# Patient Record
Sex: MALE | Race: WHITE | HISPANIC OR LATINO | Employment: OTHER | URBAN - METROPOLITAN AREA
[De-identification: names, ages, dates, MRNs, and addresses within clinical notes are randomized per-mention and may not be internally consistent; named-entity substitution may affect disease eponyms.]

---

## 2017-01-16 DIAGNOSIS — I10 BENIGN ESSENTIAL HYPERTENSION: Primary | ICD-10-CM

## 2017-01-17 RX ORDER — LISINOPRIL 20 MG/1
20 TABLET ORAL DAILY
Qty: 30 TABLET | Refills: 3 | Status: SHIPPED | OUTPATIENT
Start: 2017-01-17 | End: 2017-06-26

## 2017-03-20 DIAGNOSIS — R80.9 TYPE 2 DIABETES MELLITUS WITH MICROALBUMINURIA, WITHOUT LONG-TERM CURRENT USE OF INSULIN (H): ICD-10-CM

## 2017-03-20 DIAGNOSIS — E11.29 TYPE 2 DIABETES MELLITUS WITH MICROALBUMINURIA, WITHOUT LONG-TERM CURRENT USE OF INSULIN (H): ICD-10-CM

## 2017-03-22 DIAGNOSIS — R80.9 TYPE 2 DIABETES MELLITUS WITH MICROALBUMINURIA, WITHOUT LONG-TERM CURRENT USE OF INSULIN (H): ICD-10-CM

## 2017-03-22 DIAGNOSIS — E11.29 TYPE 2 DIABETES MELLITUS WITH MICROALBUMINURIA, WITHOUT LONG-TERM CURRENT USE OF INSULIN (H): ICD-10-CM

## 2017-03-23 RX ORDER — GLIPIZIDE 10 MG/1
10 TABLET, FILM COATED, EXTENDED RELEASE ORAL DAILY
Qty: 30 TABLET | Refills: 0 | Status: SHIPPED | OUTPATIENT
Start: 2017-03-23 | End: 2017-09-20

## 2017-05-01 DIAGNOSIS — R80.9 TYPE 2 DIABETES MELLITUS WITH MICROALBUMINURIA, WITHOUT LONG-TERM CURRENT USE OF INSULIN (H): ICD-10-CM

## 2017-05-01 DIAGNOSIS — E11.29 TYPE 2 DIABETES MELLITUS WITH MICROALBUMINURIA, WITHOUT LONG-TERM CURRENT USE OF INSULIN (H): ICD-10-CM

## 2017-05-02 NOTE — TELEPHONE ENCOUNTER
Called patient and left a message informing him that his medication was approved but needs to follow up for any further refills. Any questions please call the clinic

## 2017-06-26 DIAGNOSIS — I10 BENIGN ESSENTIAL HYPERTENSION: ICD-10-CM

## 2017-06-26 DIAGNOSIS — R80.9 TYPE 2 DIABETES MELLITUS WITH MICROALBUMINURIA, WITHOUT LONG-TERM CURRENT USE OF INSULIN (H): ICD-10-CM

## 2017-06-26 DIAGNOSIS — E11.29 TYPE 2 DIABETES MELLITUS WITH MICROALBUMINURIA, WITHOUT LONG-TERM CURRENT USE OF INSULIN (H): ICD-10-CM

## 2017-06-26 RX ORDER — LISINOPRIL 20 MG/1
20 TABLET ORAL DAILY
Qty: 30 TABLET | Refills: 3 | Status: SHIPPED | OUTPATIENT
Start: 2017-06-26 | End: 2017-09-20

## 2017-08-14 DIAGNOSIS — R80.9 TYPE 2 DIABETES MELLITUS WITH MICROALBUMINURIA, WITHOUT LONG-TERM CURRENT USE OF INSULIN (H): ICD-10-CM

## 2017-08-14 DIAGNOSIS — E11.29 TYPE 2 DIABETES MELLITUS WITH MICROALBUMINURIA, WITHOUT LONG-TERM CURRENT USE OF INSULIN (H): ICD-10-CM

## 2017-08-14 NOTE — TELEPHONE ENCOUNTER
Left a message on recorder to call back. Ok to relay message: per last refill patient needed Diabetic check up appointment prior to additional refills.

## 2017-08-18 NOTE — TELEPHONE ENCOUNTER
Called pharmacy to check to see if patient has picked up medication. He has not the pharamcy is going to try and get a hold of him when they do they will inform him he needs to make a follow up appointment for his Diabetes.

## 2017-09-20 ENCOUNTER — TELEPHONE (OUTPATIENT)
Dept: FAMILY MEDICINE | Facility: CLINIC | Age: 72
End: 2017-09-20

## 2017-09-20 DIAGNOSIS — E11.29 TYPE 2 DIABETES MELLITUS WITH MICROALBUMINURIA, WITHOUT LONG-TERM CURRENT USE OF INSULIN (H): ICD-10-CM

## 2017-09-20 DIAGNOSIS — R80.9 TYPE 2 DIABETES MELLITUS WITH MICROALBUMINURIA, WITHOUT LONG-TERM CURRENT USE OF INSULIN (H): ICD-10-CM

## 2017-09-20 DIAGNOSIS — I10 BENIGN ESSENTIAL HYPERTENSION: ICD-10-CM

## 2017-09-20 RX ORDER — GLIPIZIDE 10 MG/1
10 TABLET, FILM COATED, EXTENDED RELEASE ORAL DAILY
Qty: 30 TABLET | Refills: 0 | Status: SHIPPED | OUTPATIENT
Start: 2017-09-20 | End: 2017-10-06

## 2017-09-20 RX ORDER — LISINOPRIL 20 MG/1
20 TABLET ORAL DAILY
Qty: 30 TABLET | Refills: 0 | Status: SHIPPED | OUTPATIENT
Start: 2017-09-20 | End: 2017-10-06

## 2017-09-20 NOTE — TELEPHONE ENCOUNTER
[facility] phone call message- patient requesting a refill:    Full Medication Name:    metFORMIN (GLUCOPHAGE) 1000 MG tablet,    lisinopril (PRINIVIL/ZESTRIL) 20 MG tablet,      glipiZIDE (GLUCOTROL XL) 10 MG 24 hr tablet     Pharmacy confirmed as     [facility] [address] [place] [address]), [place] - [address] ([place]) [place] [address]  Phone: [phone] Fax: [phone]  : Yes    Additional Comments:   Please refill RXs above and need ASAP, [facility] pharmacy has faxed over request more than once with no response, please rush.     OK to leave a message on voice mail? Yes    Primary language: English      needed? No    Call taken on September 20, 2017 at 11:27 AM by Little Lester

## 2017-09-21 DIAGNOSIS — E11.29 TYPE 2 DIABETES MELLITUS WITH MICROALBUMINURIA, WITHOUT LONG-TERM CURRENT USE OF INSULIN (H): ICD-10-CM

## 2017-09-21 DIAGNOSIS — R80.9 TYPE 2 DIABETES MELLITUS WITH MICROALBUMINURIA, WITHOUT LONG-TERM CURRENT USE OF INSULIN (H): ICD-10-CM

## 2017-10-06 ENCOUNTER — RECORDS - HEALTHEAST (OUTPATIENT)
Dept: ADMINISTRATIVE | Facility: OTHER | Age: 72
End: 2017-10-06

## 2017-10-06 ENCOUNTER — OFFICE VISIT (OUTPATIENT)
Dept: FAMILY MEDICINE | Facility: CLINIC | Age: 72
End: 2017-10-06

## 2017-10-06 VITALS
WEIGHT: 157.8 LBS | BODY MASS INDEX: 25.09 KG/M2 | SYSTOLIC BLOOD PRESSURE: 167 MMHG | TEMPERATURE: 98 F | HEART RATE: 67 BPM | OXYGEN SATURATION: 99 % | DIASTOLIC BLOOD PRESSURE: 96 MMHG

## 2017-10-06 DIAGNOSIS — Z00.00 ROUTINE GENERAL MEDICAL EXAMINATION AT A HEALTH CARE FACILITY: ICD-10-CM

## 2017-10-06 DIAGNOSIS — R01.1 HEART MURMUR: ICD-10-CM

## 2017-10-06 DIAGNOSIS — E11.29 TYPE 2 DIABETES MELLITUS WITH MICROALBUMINURIA, WITHOUT LONG-TERM CURRENT USE OF INSULIN (H): ICD-10-CM

## 2017-10-06 DIAGNOSIS — E11.65 TYPE 2 DIABETES MELLITUS WITH HYPERGLYCEMIA, WITHOUT LONG-TERM CURRENT USE OF INSULIN (H): Primary | ICD-10-CM

## 2017-10-06 DIAGNOSIS — Z00.00 PREVENTATIVE HEALTH CARE: ICD-10-CM

## 2017-10-06 DIAGNOSIS — L71.9 ROSACEA: ICD-10-CM

## 2017-10-06 DIAGNOSIS — L60.2 OVERGROWN NAIL: ICD-10-CM

## 2017-10-06 DIAGNOSIS — R80.9 TYPE 2 DIABETES MELLITUS WITH MICROALBUMINURIA, WITHOUT LONG-TERM CURRENT USE OF INSULIN (H): ICD-10-CM

## 2017-10-06 DIAGNOSIS — N18.30 CKD (CHRONIC KIDNEY DISEASE) STAGE 3, GFR 30-59 ML/MIN (H): ICD-10-CM

## 2017-10-06 DIAGNOSIS — Q18.1 CYST ON EAR: ICD-10-CM

## 2017-10-06 DIAGNOSIS — I10 BENIGN ESSENTIAL HYPERTENSION: ICD-10-CM

## 2017-10-06 DIAGNOSIS — I10 ESSENTIAL HYPERTENSION: ICD-10-CM

## 2017-10-06 DIAGNOSIS — R21 RASH: ICD-10-CM

## 2017-10-06 RX ORDER — METRONIDAZOLE 7.5 MG/G
GEL TOPICAL 2 TIMES DAILY
Qty: 45 G | Refills: 11 | Status: SHIPPED | OUTPATIENT
Start: 2017-10-06 | End: 2018-04-20

## 2017-10-06 RX ORDER — LISINOPRIL 40 MG/1
40 TABLET ORAL DAILY
Qty: 90 TABLET | Refills: 3 | Status: SHIPPED | OUTPATIENT
Start: 2017-10-06 | End: 2018-04-20

## 2017-10-06 RX ORDER — ATORVASTATIN CALCIUM 40 MG/1
80 TABLET, FILM COATED ORAL DAILY
Qty: 90 TABLET | Refills: 3 | Status: SHIPPED | OUTPATIENT
Start: 2017-10-06 | End: 2018-04-20

## 2017-10-06 RX ORDER — GLIPIZIDE 10 MG/1
10 TABLET, FILM COATED, EXTENDED RELEASE ORAL DAILY
Qty: 90 TABLET | Refills: 3 | Status: SHIPPED | OUTPATIENT
Start: 2017-10-06 | End: 2018-04-20

## 2017-10-06 NOTE — PATIENT INSTRUCTIONS
Return in about 4 weeks for labs please.    When you want you can come in for removal of cyst behind left ear and we can also remove mole from neck then.    You could have a scan of your heart because of the murmur some time.      PODIATRY REFERRAL:  [facility]  [address]   [place] [address]  [phone]  Date:  Tuesday October 10, 2017  Time:  2:15 PM with Dr. Diego  Given to patient today.  Little Lester  10/6/17

## 2017-10-06 NOTE — MR AVS SNAPSHOT
After Visit Summary   10/6/2017    Dennis Blum    MRN: [id number]           Patient Information     Date Of Birth          1945        Visit Information        Provider Department      10/6/2017 8:20 AM Pavan Christy MD [facility]        Today's Diagnoses     Type 2 diabetes mellitus with hyperglycemia, without long-term current use of insulin (H)    -  1    CKD (chronic kidney disease) stage 3, GFR 30-59 ml/min        Essential hypertension        Routine general medical examination at a health care facility        Overgrown nail        Preventative health care        Type 2 diabetes mellitus with microalbuminuria, without long-term current use of insulin (H)        Rash        Benign essential hypertension        Rosacea          Care Instructions    Return in about 4 weeks for labs please.    When you want you can come in for removal of cyst behind left ear and we can also remove mole from neck then.    You could have a scan of your heart because of the murmur some time.            Follow-ups after your visit        Additional Services     PODIATRY/FOOT & ANKLE SURGERY REFERRAL       Patient to stop at the [facility]    Reason for Referral: Diabetes type 2, trim nails     needed: No  Language: English    May leave message on voicemail: Yes    ([place] Only) Referral should be tracked (Yes/No)?                  Follow-up notes from your care team     Return in about 4 weeks (around 11/3/2017) for Lab Work.      Future tests that were ordered for you today     Open Future Orders        Priority Expected Expires Ordered    PODIATRY/FOOT & ANKLE SURGERY REFERRAL Routine  11/6/2017 10/6/2017            Who to contact     Please call your clinic at [phone] to:    Ask questions about your health    Make or cancel appointments    Discuss your medicines    Learn about your test results    Speak to your doctor   If you have compliments or concerns about an experience at your  clinic, or if you wish to file a complaint, please contact [facility] Patient Relations at [phone] or email us at [email or web address]         Additional Information About Your Visit        [employer] Information     [employer] gives you secure access to your electronic health record. If you see a primary care provider, you can also send messages to your care team and make appointments. If you have questions, please call your primary care clinic.  If you do not have a primary care provider, please call [phone] and they will assist you.      [employer] is an electronic gateway that provides easy, online access to your medical records. With [employer], you can request a clinic appointment, read your test results, renew a prescription or communicate with your care team.     To access your existing account, please contact your [facility] or call [phone] for assistance.        Care EveryWhere ID     This is your Care EveryWhere ID. This could be used by other organizations to access your [facility] medical records  [id number]        Your Vitals Were     Pulse Temperature Pulse Oximetry BMI (Body Mass Index)          67 98  F (36.7  C) 99% 25.09 kg/m2         Blood Pressure from Last 3 Encounters:   10/06/17 (!) 167/96   09/23/16 171/83   03/08/16 173/81    Weight from Last 3 Encounters:   10/06/17 157 lb 12.8 oz (71.6 kg)   09/23/16 153 lb 12.8 oz (69.8 kg)   03/08/16 156 lb (70.8 kg)              We Performed the Following     Basic Metabolic Panel ([facility])     Hemoglobin A1c ([facility])     Hepatitis C Antibody ([facility])     Lipid Panel ([facility])     Microalbumin Creatinine Ratio Random Ur ([facility])          Today's Medication Changes          These changes are accurate as of: 10/6/17  9:08 AM.  If you have any questions, ask your nurse or doctor.               Start taking these medicines.        Dose/Directions    blood glucose lancets  standard   Commonly known as:  no brand specified   Used for:  Type 2 diabetes mellitus with hyperglycemia, without long-term current use of insulin (H)   Started by:  Pavan Christy MD        Use to test blood sugar 2 times daily or as directed.   Quantity:  100 each   Refills:  11       metroNIDAZOLE 0.75 % topical gel   Commonly known as:  METROGEL   Used for:  Rosacea   Started by:  Pavan Christy MD        Apply topically 2 times daily   Quantity:  45 g   Refills:  11         These medicines have changed or have updated prescriptions.        Dose/Directions    blood glucose monitoring test strip   Commonly known as:  no brand specified   This may have changed:  additional instructions   Used for:  Type 2 diabetes mellitus with hyperglycemia, without long-term current use of insulin (H)   Changed by:  Pavan Christy MD        Use to test blood sugars 2 times daily or as directed   Quantity:  100 strip   Refills:  11       lisinopril 40 MG tablet   Commonly known as:  PRINIVIL/ZESTRIL   This may have changed:    - medication strength  - how much to take   Used for:  Benign essential hypertension   Changed by:  Pavan Christy MD        Dose:  40 mg   Take 1 tablet (40 mg) by mouth daily   Quantity:  90 tablet   Refills:  3       metFORMIN 500 MG tablet   Commonly known as:  GLUCOPHAGE   This may have changed:  medication strength   Used for:  Type 2 diabetes mellitus with microalbuminuria, without long-term current use of insulin (H)   Changed by:  Pavan Christy MD        Dose:  1000 mg   Take 2 tablets (1,000 mg) by mouth 2 times daily (with meals)   Quantity:  360 tablet   Refills:  3         Stop taking these medicines if you haven't already. Please contact your care team if you have questions.     blood glucose monitoring lancets   Stopped by:  Pavan Christy MD           hydrocortisone 2.5 % cream   Stopped by:  Pavan Christy MD                Where to get your medicines      These medications were sent to [facility] - [place], [place] - [address], [place] [address]     Phone:  [phone]     aspirin 81 MG tablet    atorvastatin 40 MG tablet    blood glucose lancets standard    blood glucose monitoring test strip    glipiZIDE 10 MG 24 hr tablet    lisinopril 40 MG tablet    metFORMIN 500 MG tablet    metroNIDAZOLE 0.75 % topical gel                Primary Care Provider Office Phone # Fax #    Pavan Christy [phone]       [address] [address]        Equal Access to Services     SHAAN RAMOS : Hadii aad ku hadasho Soomaali, waaxda luqadaha, qaybta kaalmada adeegyada, waxay idiin hayaan marjorie jones . So [facility] [phone].    ATENCIÓN: Si habla español, tiene a mitchell disposición servicios gratuitos de asistencia lingüística. [facility] [phone].    We comply with applicable federal civil rights laws and [place] laws. We do not discriminate on the basis of race, color, national origin, age, disability, sex, sexual orientation, or gender identity.            Thank you!     Thank you for choosing [facility]  for your care. Our goal is always to provide you with excellent care. Hearing back from our patients is one way we can continue to improve our services. Please take a few minutes to complete the written survey that you may receive in the mail after your visit with us. Thank you!             Your Updated Medication List - Protect others around you: Learn how to safely use, store and throw away your medicines at www.disposemymeds.org.          This list is accurate as of: 10/6/17  9:08 AM.  Always use your most recent med list.                   Brand Name Dispense Instructions for use Diagnosis    aspirin 81 MG tablet     90 tablet    Take 1 tablet (81 mg) by mouth daily    Preventative health care       atorvastatin 40 MG tablet    LIPITOR    90 tablet    Take 2 tablets (80 mg) by mouth daily    Type 2 diabetes mellitus  with microalbuminuria, without long-term current use of insulin (H)       blood glucose lancets standard    no brand specified    100 each    Use to test blood sugar 2 times daily or as directed.    Type 2 diabetes mellitus with hyperglycemia, without long-term current use of insulin (H)       blood glucose monitoring meter device kit     1 kit    Check fasting glucose 2 day. Can substitute for what is covered by insurance.    Type 2 diabetes mellitus with hyperglycemia (H)       blood glucose monitoring test strip    no brand specified    100 strip    Use to test blood sugars 2 times daily or as directed    Type 2 diabetes mellitus with hyperglycemia, without long-term current use of insulin (H)       glipiZIDE 10 MG 24 hr tablet    GLUCOTROL XL    90 tablet    Take 1 tablet (10 mg) by mouth daily    Type 2 diabetes mellitus with microalbuminuria, without long-term current use of insulin (H)       lisinopril 40 MG tablet    PRINIVIL/ZESTRIL    90 tablet    Take 1 tablet (40 mg) by mouth daily    Benign essential hypertension       MAGNESIUM OXIDE PO           metFORMIN 500 MG tablet    GLUCOPHAGE    360 tablet    Take 2 tablets (1,000 mg) by mouth 2 times daily (with meals)    Type 2 diabetes mellitus with microalbuminuria, without long-term current use of insulin (H)       metroNIDAZOLE 0.75 % topical gel    METROGEL    45 g    Apply topically 2 times daily    Rosacea       mineral oil-white petrolatum Crea cream     454 g    Apply topically 2 times daily as needed for dry skin    Dry skin       OMEGA-3 FISH OIL PO

## 2017-10-06 NOTE — PROGRESS NOTES
This is a 72-year-old male who attends to follow-up on his diabetes.  I have not previously met him and he confused me with another clinic physician whom he has previously seen. Nonetheless he would like me to become his PCP and I have agreed.    Overall he feels well. He has been out of his medications for about 6 weeks. We discussed the fact that he has not had a PCP was a contributing factor and that going forward I will try to ensure there are no lapses in his prescriptions.  He may have noticed a mild increase in urinary frequency but otherwise no complaints related to uncontrolled diabetes.    He does have a swelling behind his left ear that he would like evaluated and if possible removed.    Objective:  BP (!) 167/96  Pulse 67  Temp 98  F (36.7  C)  Wt 157 lb 12.8 oz (71.6 kg)  SpO2 99%  BMI 25.09 kg/m2  His BMI is satisfactory but his blood pressure is uncontrolled. On exam he has a cystic swelling behind the pinna of the left ear. This does not have any worrisome features clinically.  He also has a variegated mole on the lower neck and would be agreeable to having this biopsied.    Heart sounds reveal a soft systolic murmur throughout the precordium suggesting it may be mitral valve in origin. He believes a physician had previously noticed this but it's never been worked up to due to financial concerns.    Lungs are clear to auscultation. Exam of his lower extremities reveals no edema. Microfilament testing of his feet is negative for neuropathy. He's got good distal pulses. He has overgrown nails and one deformed nail on the fourth toe of the left foot.    He does have facial erythema. He's been using 2.5% hydrocortisone p.r.n. and I advised him against continuing this. Clinically this may represent rosacea.    Dennis was seen today for diabetes and mass.    Diagnoses and all orders for this visit:    Type 2 diabetes mellitus with hyperglycemia, without long-term current use of insulin (H)  -      Hemoglobin A1c ([facility])  -     Microalbumin Creatinine Ratio Random Ur ([facility])  -     Lipid Panel ([facility])  -     blood glucose monitoring (NO BRAND SPECIFIED) test strip; Use to test blood sugars 2 times daily or as directed  -     blood glucose (NO BRAND SPECIFIED) lancets standard; Use to test blood sugar 2 times daily or as directed.    CKD (chronic kidney disease) stage 3, GFR 30-59 ml/min  -     Basic Metabolic Panel ([facility])  -     Microalbumin Creatinine Ratio Random Ur ([facility])    Essential hypertension    Routine general medical examination at a health care facility  -     Hepatitis C Antibody ([facility])    Overgrown nail  -     PODIATRY/FOOT & ANKLE SURGERY REFERRAL; Future    Preventative health care  -     aspirin 81 MG tablet; Take 1 tablet (81 mg) by mouth daily    Type 2 diabetes mellitus with microalbuminuria, without long-term current use of insulin (H)  -     atorvastatin (LIPITOR) 40 MG tablet; Take 2 tablets (80 mg) by mouth daily  -     glipiZIDE (GLUCOTROL XL) 10 MG 24 hr tablet; Take 1 tablet (10 mg) by mouth daily  -     metFORMIN (GLUCOPHAGE) 500 MG tablet; Take 2 tablets (1,000 mg) by mouth 2 times daily (with meals)  -     ADMIN VACCINE, EACH ADDITIONAL  -     ADMIN VACCINE, INITIAL  -     Pneumococcal vaccine 13 valent PCV13 IM (Prevnar) [12860]    Rash    Benign essential hypertension  -     lisinopril (PRINIVIL/ZESTRIL) 40 MG tablet; Take 1 tablet (40 mg) by mouth daily    Rosacea  -     metroNIDAZOLE (METROGEL) 0.75 % topical gel; Apply topically 2 times daily    Heart murmur    Cyst on ear    we agreed to restart all his medications. We agreed to defer lab testing today given that he has not been taking any medications for about 6 weeks. He agrees to return in about 6 weeks' time to have labs checked. He hasn't been seen in over a year and I have encouraged him to make clinic visits every 3-6 months.    I'm willing to remove the cyst from behind his left ear and also  biopsy of the mole and he will follow-up with this at a point that suits him.    I recommended an echo to evaluate his systolic murmur and he'll consider when he wishes to do this.    I prescribed metronidazole gel for possible rosacea. We'll follow-up with that.    I've referred him to podiatry for clipping of his nails.    Total visit time today was 40 minutes all of this is face-to-face and determine over 50% spent in counseling and coordination of care as outlined above.

## 2018-04-20 ENCOUNTER — OFFICE VISIT (OUTPATIENT)
Dept: FAMILY MEDICINE | Facility: CLINIC | Age: 73
End: 2018-04-20
Payer: MEDICARE

## 2018-04-20 ENCOUNTER — RECORDS - HEALTHEAST (OUTPATIENT)
Dept: ADMINISTRATIVE | Facility: OTHER | Age: 73
End: 2018-04-20

## 2018-04-20 VITALS
WEIGHT: 159 LBS | BODY MASS INDEX: 25.28 KG/M2 | SYSTOLIC BLOOD PRESSURE: 164 MMHG | TEMPERATURE: 98.7 F | RESPIRATION RATE: 16 BRPM | OXYGEN SATURATION: 99 % | HEART RATE: 77 BPM | DIASTOLIC BLOOD PRESSURE: 83 MMHG

## 2018-04-20 DIAGNOSIS — R80.9 TYPE 2 DIABETES MELLITUS WITH MICROALBUMINURIA, WITHOUT LONG-TERM CURRENT USE OF INSULIN (H): ICD-10-CM

## 2018-04-20 DIAGNOSIS — N18.30 CKD (CHRONIC KIDNEY DISEASE) STAGE 3, GFR 30-59 ML/MIN (H): ICD-10-CM

## 2018-04-20 DIAGNOSIS — L84 CORNS AND CALLOSITIES: ICD-10-CM

## 2018-04-20 DIAGNOSIS — E11.29 TYPE 2 DIABETES MELLITUS WITH MICROALBUMINURIA, WITHOUT LONG-TERM CURRENT USE OF INSULIN (H): ICD-10-CM

## 2018-04-20 DIAGNOSIS — Z00.00 ROUTINE GENERAL MEDICAL EXAMINATION AT A HEALTH CARE FACILITY: ICD-10-CM

## 2018-04-20 DIAGNOSIS — E11.65 TYPE 2 DIABETES MELLITUS WITH HYPERGLYCEMIA, WITHOUT LONG-TERM CURRENT USE OF INSULIN (H): ICD-10-CM

## 2018-04-20 DIAGNOSIS — Z13.6 SCREENING FOR AAA (ABDOMINAL AORTIC ANEURYSM): ICD-10-CM

## 2018-04-20 DIAGNOSIS — I35.0 NONRHEUMATIC AORTIC VALVE STENOSIS: Primary | ICD-10-CM

## 2018-04-20 DIAGNOSIS — Z00.00 PREVENTATIVE HEALTH CARE: ICD-10-CM

## 2018-04-20 DIAGNOSIS — B19.20 HEPATITIS C VIRUS INFECTION WITHOUT HEPATIC COMA, UNSPECIFIED CHRONICITY: ICD-10-CM

## 2018-04-20 DIAGNOSIS — I10 BENIGN ESSENTIAL HYPERTENSION: ICD-10-CM

## 2018-04-20 DIAGNOSIS — Z51.81 ENCOUNTER FOR THERAPEUTIC DRUG MONITORING: ICD-10-CM

## 2018-04-20 DIAGNOSIS — L71.9 ROSACEA: ICD-10-CM

## 2018-04-20 LAB
BUN SERPL-MCNC: 21.8 MG/DL (ref 7–21)
CALCIUM SERPL-MCNC: 9.3 MG/DL (ref 8.5–10.1)
CHLORIDE SERPLBLD-SCNC: 103.7 MMOL/L (ref 98–110)
CHOLEST SERPL-MCNC: 232.2 MG/DL (ref 0–200)
CHOLEST/HDLC SERPL: 5.3 {RATIO} (ref 0–5)
CO2 SERPL-SCNC: 24.4 MMOL/L (ref 20–32)
CREAT SERPL-MCNC: 1.1 MG/DL (ref 0.7–1.3)
GFR SERPL CREATININE-BSD FRML MDRD: 70.3 ML/MIN/1.7 M2
GLUCOSE SERPL-MCNC: 178 MG'DL (ref 70–99)
HBA1C MFR BLD: 7.3 % (ref 4.1–5.7)
HDLC SERPL-MCNC: 44.2 MG/DL
LDLC SERPL CALC-MCNC: 166 MG/DL (ref 0–129)
POTASSIUM SERPL-SCNC: 4.4 MMOL/DL (ref 3.2–4.6)
SODIUM SERPL-SCNC: 135.2 MMOL/L (ref 132–142)
TRIGL SERPL-MCNC: 110.7 MG/DL (ref 0–150)
VIT B12 SERPL-MCNC: 258 PG/ML (ref 213–816)
VLDL CHOLESTEROL: 22.1 MG/DL (ref 7–32)

## 2018-04-20 RX ORDER — ATORVASTATIN CALCIUM 40 MG/1
40 TABLET, FILM COATED ORAL DAILY
Qty: 90 TABLET | Refills: 3 | Status: SHIPPED | OUTPATIENT
Start: 2018-04-20 | End: 2018-10-19

## 2018-04-20 RX ORDER — LISINOPRIL 40 MG/1
40 TABLET ORAL DAILY
Qty: 90 TABLET | Refills: 3 | Status: SHIPPED | OUTPATIENT
Start: 2018-04-20 | End: 2018-08-06

## 2018-04-20 RX ORDER — GLIPIZIDE 10 MG/1
10 TABLET, FILM COATED, EXTENDED RELEASE ORAL DAILY
Qty: 90 TABLET | Refills: 3 | Status: SHIPPED | OUTPATIENT
Start: 2018-04-20 | End: 2018-10-05

## 2018-04-20 RX ORDER — METRONIDAZOLE 7.5 MG/G
GEL TOPICAL 2 TIMES DAILY
Qty: 45 G | Refills: 11 | Status: SHIPPED | OUTPATIENT
Start: 2018-04-20 | End: 2018-10-19

## 2018-04-20 NOTE — PROGRESS NOTES
This is a 72-year-old male who attends to follow-up on his diabetes.  At last visit I had placed lab orders which he neglected to have performed and have been told that he needed to reschedule a physician visit before they could be drawn.  He is willing to have them drawn today.  He believes he is doing well.  He is very adherent with his meds and is careful about his diet.  He also goes to a gym and works out.  He reports that he works 2 jobs totaling nearly 60 hours per week.    He is potentially interested in having some moles removed from his trunk but knows that he will need to schedule a follow-up visit for this.    ROS:  CVS: He denies chest pain shortness of breath or palpitations  Extremities: He does have painful corns on the bottom of both feet  Medications: He reports that several prescribed medications are too expensive for him.  He pays out-of-pocket for his medications while his clinic visits and labs are all fully covered by Medicare.    Objective:  /83  Pulse 77  Temp 98.7  F (37.1  C)  Resp 16  Wt 159 lb (72.1 kg)  SpO2 99%  BMI 25.28 kg/m2  His systolic blood pressure is elevated.  He does have what appear to be seborrheic keratoses on his trunk.  Heart sounds do reveal a 3/6 systolic murmur in the aortic area consistent with aortic stenosis.  Lungs are clear to auscultation, pulse is regular, he has no lower extremity edema.  He does have 2 calluses over the metatarsal heads on both soles and I offered repair these.      Procedure:  With his consent I pared the 2 calluses on the soles of both feet without complication.    Dennis was seen today for diabetes.    Diagnoses and all orders for this visit:    Nonrheumatic aortic valve stenosis  -     Echocardiogram Complete; Future    Type 2 diabetes mellitus with hyperglycemia, without long-term current use of insulin (H)  -     Hemoglobin A1c ([facility] )  -     Lipid Panel ([facility])  -     Microalbumin Random Ur ([facility]); Future    CKD  (chronic kidney disease) stage 3, GFR 30-59 ml/min  -     Basic Metabolic Panel ([facility])    Routine general medical examination at a health care facility  -     Hepatitis C Antibody ([facility])    Type 2 diabetes mellitus with microalbuminuria, without long-term current use of insulin (H)  -     atorvastatin (LIPITOR) 40 MG tablet; Take 1 tablet (40 mg) by mouth daily  -     glipiZIDE (GLUCOTROL XL) 10 MG 24 hr tablet; Take 1 tablet (10 mg) by mouth daily  -     metFORMIN (GLUCOPHAGE) 500 MG tablet; Take 2 tablets (1,000 mg) by mouth 2 times daily (with meals)    Benign essential hypertension  -     lisinopril (PRINIVIL/ZESTRIL) 40 MG tablet; Take 1 tablet (40 mg) by mouth daily    Preventative health care  -     aspirin 81 MG tablet; Take 1 tablet (81 mg) by mouth daily    Rosacea  -     metroNIDAZOLE (METROGEL) 0.75 % topical gel; Apply topically 2 times daily    Encounter for therapeutic drug monitoring  -     Vitamin B12 ([facility])    Corns and callosities  -     TRIM HYPERKERATOTIC SKIN LESION,2-4    Screening for AAA (abdominal aortic aneurysm)  -     Abdominal Aortic Aneurysm Screening/Tracking      He will check labs today.  He confirmed that he has never smoked in his life and therefore is not a candidate for AAA screening.    He has never had an echo and I recommended this is a good idea to evaluate his murmur which sounds to be aortic stenosis.  I have given him some patient information about this.    In addition he appears to have rosacea and I have given him information about this and also refill the metronidazole gel.  We showed him that prescription costs very considerably and that he will be able to find cheaper medications and gave him some resources toward that end.  I have also asked  to provide him with some information about Medicare part D.    Will notify him with his lab results.  In the meantime I refilled all his meds and strongly encouraged him to take atorvastatin which  she can find a more reasonable price.    He satisfied with the plan and will follow-up as needed and every 3 months for routine cares.    Total visit time was 25 mins, all of which was face to face MD time, and over 50% of this time was spent in counseling and coordination of care.

## 2018-04-20 NOTE — LETTER
April 23, 2018      Dennis Blum  [address] [place] [address]    Please see below for your test results.    Resulted Orders   Hemoglobin A1c ([facility])   Result Value Ref Range    Hemoglobin A1C 7.3 (H) 4.1 - 5.7 %   Basic Metabolic Panel ([facility])   Result Value Ref Range    Urea Nitrogen 21.8 (H) 7.0 - 21.0 mg/dL    Calcium 9.3 8.5 - 10.1 mg/dL    Chloride 103.7 98.0 - 110.0 mmol/L    Carbon Dioxide 24.4 20.0 - 32.0 mmol/L    Creatinine 1.1 0.7 - 1.3 mg/dL    Glucose 178.0 (H) 70.0 - 99.0 mg'dL    Potassium 4.4 3.2 - 4.6 mmol/dL    Sodium 135.2 132.0 - 142.0 mmol/L    GFR Estimate 70.3 >60.0 mL/min/1.7 m2    GFR Estimate If Black 85.1 >60.0 mL/min/1.7 m2   Lipid Panel ([facility])   Result Value Ref Range    Cholesterol 232.2 (H) 0.0 - 200.0 mg/dL    Cholesterol/HDL Ratio 5.3 (H) 0.0 - 5.0    HDL Cholesterol 44.2 >40.0 mg/dL    LDL Cholesterol Calculated 166 (H) 0 - 129 mg/dL    Triglycerides 110.7 0.0 - 150.0 mg/dL    VLDL Cholesterol 22.1 7.0 - 32.0 mg/dL   Vitamin B12 ([facility])   Result Value Ref Range    Vitamin B12 258 213 - 816 pg/mL    Narrative    Test performed by:  [facility] [address] [place] [address]     Dennis     - we screened for Hepatitis C because of your age (people born between 1943 - 1965) may have contracted Hepatitis C without ever knowing it.  The test has come back positive.  We need to do a more accurate test to check if this is correct.  Can you come in to have that lab drawn?  You do not need to see a doctor - just come into lab.  OK?  I will let you know the result of that then, take care -       Dr Christy

## 2018-04-20 NOTE — PATIENT INSTRUCTIONS
"Recommend \"good Rx\" tere      Heart Valve Problems: Aortic Stenosis  Aortic stenosis means your aortic valve has a problem opening. The left ventricle has to work harder to push the blood through the valve. In some cases, this extra work will make the muscle of the ventricle thicken. In time, the extra work can tire the heart and cause the heart muscle to weaken. Stenosis usually get worse slowly, over many years. But sometimes it can quickly get worse.  Possible causes  Calcium deposits can form on the aortic valve as you get older. These deposits make the valve stiff and hard to open. In some cases, you may have been born with an abnormal aortic valve. Or your aortic valve may have been damaged by rheumatic fever or a heart infection.        Open aortic valve with stenosis (viewed from above).      Treating aortic stenosis  In many cases, treatment won t be needed unless you have symptoms. If you do have symptoms, medicines may help relieve them. If the stenosis is severe, your doctor may recommend surgery to replace the valve, even if you don t have symptoms.  Date Last Reviewed: 6/1/2016 2000-2017 [employer]. [address] [place] [address]. All rights reserved. This information is not intended as a substitute for professional medical care. Always follow your healthcare professional's instructions.        Rosacea  Rosacea is a long-lasting (chronic) skin condition affecting the face. In the early stages it causes easy flushing or blushing. Redness may become long-term (permanent) as the small blood vessels of the face widen (dilate). There may be small, red, pus-filled bumps (pustules). It looks like a bad case of acne, and has been called adult acne. But it is not caused by the same things that cause acne.  Rosacea is a chronic illness. You will have flare-ups that come and go. This may happen every few weeks or every few months. Experts don't know what causes rosacea. If not treated, " it tends to get worse over time.  Some men with a more severe form of rosacea develop a condition called rhinophyma. The oil glands on the skin of the nose become blocked and the nose gets bigger. The cheeks also become puffy. Alcohol may increase the flushing. But this condition is not caused by alcohol use.  Rosacea can be treated with topical gels and creams. Oral antibiotics are used for more severe cases. You should see improvement in the first 4 weeks. Dilated blood vessels can be treated with a small electric needle or laser surgery. Rhinophyma can be treated with surgery. Or it may be treated by surgically scraping the skin (dermabrasion).  Home care    Avoid things that make your face red or flushed. These include hot drinks, spicy foods, caffeine, and alcohol.    Exercise indoors or in a cool area to avoid getting overheated.    Avoid excess sun exposure. Use a sunscreen with at least SPF 15 or higher.    Avoid extreme hot or extreme cold weather.    Don't scrub your face. That will irritate the skin and increase redness.    Avoid harsh soaps or moisturizers with irritating ingredients. You may need to use hypoallergenic cosmetics.    Avoid over-the-counter treatments unless instructed by your healthcare provider. Some of these treatments may make rosacea worse.    Try to figure out what triggers your flares (such as stress, sun exposure, or certain foods).  Follow-up care  Follow up with your healthcare provider, or as advised. Contact your provider if your condition is not responding to the medicines you were given. Getting treatment early can stop it from getting worse.  When to seek medical advice  Call your healthcare provider right away if you have redness, burning, or a gritty feeling in your eyes.  Date Last Reviewed: 8/1/2016 2000-2017 [employer]. [address] [place] [address], [place] [address]. All rights reserved. This information is not intended as a substitute for professional  medical care. Always follow your healthcare professional's instructions.      Goal BLOOD PRESSURE is under 150 over under 90    Referral sent to [facility]  Phone: [phone]  Fax: [phone]  Clinic will contact patient to schedule  es April 20, 2018    REFERRAL TRACKING:  Per [facility] (2) attempts to reach patient for scheduling and no return call.  Little  9/4/18

## 2018-04-20 NOTE — MR AVS SNAPSHOT
"              After Visit Summary   4/20/2018    Dennis Blum    MRN: [id number]           Patient Information     Date Of Birth          1945        Visit Information        Provider Department      4/20/2018 9:00 AM Pavan Christy MD [facility]        Today's Diagnoses     Nonrheumatic aortic valve stenosis    -  1    Type 2 diabetes mellitus with hyperglycemia, without long-term current use of insulin (H)        CKD (chronic kidney disease) stage 3, GFR 30-59 ml/min        Routine general medical examination at a health care facility        Type 2 diabetes mellitus with microalbuminuria, without long-term current use of insulin (H)        Benign essential hypertension        Preventative health care        Rosacea        Encounter for therapeutic drug monitoring          Care Instructions    Recommend \"good Rx\" tere      Heart Valve Problems: Aortic Stenosis  Aortic stenosis means your aortic valve has a problem opening. The left ventricle has to work harder to push the blood through the valve. In some cases, this extra work will make the muscle of the ventricle thicken. In time, the extra work can tire the heart and cause the heart muscle to weaken. Stenosis usually get worse slowly, over many years. But sometimes it can quickly get worse.  Possible causes  Calcium deposits can form on the aortic valve as you get older. These deposits make the valve stiff and hard to open. In some cases, you may have been born with an abnormal aortic valve. Or your aortic valve may have been damaged by rheumatic fever or a heart infection.        Open aortic valve with stenosis (viewed from above).      Treating aortic stenosis  In many cases, treatment won t be needed unless you have symptoms. If you do have symptoms, medicines may help relieve them. If the stenosis is severe, your doctor may recommend surgery to replace the valve, even if you don t have symptoms.  Date Last Reviewed: 6/1/2016 2000-2017 The " [employer] [address] [place] [address]. All rights reserved. This information is not intended as a substitute for professional medical care. Always follow your healthcare professional's instructions.        Rosacea  Rosacea is a long-lasting (chronic) skin condition affecting the face. In the early stages it causes easy flushing or blushing. Redness may become long-term (permanent) as the small blood vessels of the face widen (dilate). There may be small, red, pus-filled bumps (pustules). It looks like a bad case of acne, and has been called adult acne. But it is not caused by the same things that cause acne.  Rosacea is a chronic illness. You will have flare-ups that come and go. This may happen every few weeks or every few months. Experts don't know what causes rosacea. If not treated, it tends to get worse over time.  Some men with a more severe form of rosacea develop a condition called rhinophyma. The oil glands on the skin of the nose become blocked and the nose gets bigger. The cheeks also become puffy. Alcohol may increase the flushing. But this condition is not caused by alcohol use.  Rosacea can be treated with topical gels and creams. Oral antibiotics are used for more severe cases. You should see improvement in the first 4 weeks. Dilated blood vessels can be treated with a small electric needle or laser surgery. Rhinophyma can be treated with surgery. Or it may be treated by surgically scraping the skin (dermabrasion).  Home care    Avoid things that make your face red or flushed. These include hot drinks, spicy foods, caffeine, and alcohol.    Exercise indoors or in a cool area to avoid getting overheated.    Avoid excess sun exposure. Use a sunscreen with at least SPF 15 or higher.    Avoid extreme hot or extreme cold weather.    Don't scrub your face. That will irritate the skin and increase redness.    Avoid harsh soaps or moisturizers with irritating ingredients. You may  need to use hypoallergenic cosmetics.    Avoid over-the-counter treatments unless instructed by your healthcare provider. Some of these treatments may make rosacea worse.    Try to figure out what triggers your flares (such as stress, sun exposure, or certain foods).  Follow-up care  Follow up with your healthcare provider, or as advised. Contact your provider if your condition is not responding to the medicines you were given. Getting treatment early can stop it from getting worse.  When to seek medical advice  Call your healthcare provider right away if you have redness, burning, or a gritty feeling in your eyes.  Date Last Reviewed: 8/1/2016 2000-2017 [employer]. [address] [place] [address], [place] [address]. All rights reserved. This information is not intended as a substitute for professional medical care. Always follow your healthcare professional's instructions.      Goal BLOOD PRESSURE is under 150 over under 90          Follow-ups after your visit        Follow-up notes from your care team     Return in about 3 months (around 7/20/2018), or if symptoms worsen or fail to improve.      Future tests that were ordered for you today     Open Future Orders        Priority Expected Expires Ordered    Echocardiogram Complete Routine  4/20/2019 4/20/2018    Microalbumin Random Ur ([facility]) Routine 4/20/2018 4/20/2019 4/20/2018            Who to contact     Please call your clinic at [phone] to:    Ask questions about your health    Make or cancel appointments    Discuss your medicines    Learn about your test results    Speak to your doctor            Additional Information About Your Visit        [employer] Information     [employer] gives you secure access to your electronic health record. If you see a primary care provider, you can also send messages to your care team and make appointments. If you have questions, please call your primary care clinic.  If you do not have a primary care provider,  please call [phone] and they will assist you.      [employer] is an electronic gateway that provides easy, online access to your medical records. With [employer], you can request a clinic appointment, read your test results, renew a prescription or communicate with your care team.     To access your existing account, please contact your [facility] or call [phone] for assistance.        Care EveryWhere ID     This is your Care EveryWhere ID. This could be used by other organizations to access your [facility] medical records  [id number]        Your Vitals Were     Pulse Temperature Respirations Pulse Oximetry BMI (Body Mass Index)       77 98.7  F (37.1  C) 16 99% 25.28 kg/m2        Blood Pressure from Last 3 Encounters:   04/20/18 164/83   10/06/17 (!) 167/96   09/23/16 171/83    Weight from Last 3 Encounters:   04/20/18 159 lb (72.1 kg)   10/06/17 157 lb 12.8 oz (71.6 kg)   09/23/16 153 lb 12.8 oz (69.8 kg)              We Performed the Following     Basic Metabolic Panel ([facility])     Hemoglobin A1c ([facility])     Hepatitis C Antibody ([facility])     Lipid Panel ([facility])     Vitamin B12 ([facility])          Today's Medication Changes          These changes are accurate as of 4/20/18 10:14 AM.  If you have any questions, ask your nurse or doctor.               These medicines have changed or have updated prescriptions.        Dose/Directions    atorvastatin 40 MG tablet   Commonly known as:  LIPITOR   This may have changed:  how much to take   Used for:  Type 2 diabetes mellitus with microalbuminuria, without long-term current use of insulin (H)   Changed by:  Pavan Christy MD        Dose:  40 mg   Take 1 tablet (40 mg) by mouth daily   Quantity:  90 tablet   Refills:  3            Where to get your medicines      Some of these will need a paper prescription and others can be bought over the counter.  Ask your nurse if you have questions.     Bring a paper prescription for each  of these medications     aspirin 81 MG tablet    atorvastatin 40 MG tablet    glipiZIDE 10 MG 24 hr tablet    lisinopril 40 MG tablet    metFORMIN 500 MG tablet    metroNIDAZOLE 0.75 % topical gel                Primary Care Provider Office Phone # Fax #    Pavan Christy [phone]       [address] [address]        Equal Access to Services     SHAAN RAMOS : Hadii aad ku hadasho Soomaali, waaxda luqadaha, qaybta kaalmada adeegyada, monica sahu haytomn marjorie piperpitateresa jones . So [facility] [phone].    ATENCIÓN: Si habla español, tiene a mitchell disposición servicios gratuitos de asistencia lingüística. [facility] [phone].    We comply with applicable federal civil rights laws and [place] laws. We do not discriminate on the basis of race, color, national origin, age, disability, sex, sexual orientation, or gender identity.            Thank you!     Thank you for choosing [facility]  for your care. Our goal is always to provide you with excellent care. Hearing back from our patients is one way we can continue to improve our services. Please take a few minutes to complete the written survey that you may receive in the mail after your visit with us. Thank you!             Your Updated Medication List - Protect others around you: Learn how to safely use, store and throw away your medicines at www.disposemymeds.org.          This list is accurate as of 4/20/18 10:14 AM.  Always use your most recent med list.                   Brand Name Dispense Instructions for use Diagnosis    aspirin 81 MG tablet     90 tablet    Take 1 tablet (81 mg) by mouth daily    Preventative health care       atorvastatin 40 MG tablet    LIPITOR    90 tablet    Take 1 tablet (40 mg) by mouth daily    Type 2 diabetes mellitus with microalbuminuria, without long-term current use of insulin (H)       blood glucose lancets standard    no brand specified    100 each    Use to test blood sugar 2  times daily or as directed.    Type 2 diabetes mellitus with hyperglycemia, without long-term current use of insulin (H)       blood glucose monitoring meter device kit     1 kit    Check fasting glucose 2 day. Can substitute for what is covered by insurance.    Type 2 diabetes mellitus with hyperglycemia (H)       blood glucose monitoring test strip    no brand specified    100 strip    Use to test blood sugars 2 times daily or as directed    Type 2 diabetes mellitus with hyperglycemia, without long-term current use of insulin (H)       glipiZIDE 10 MG 24 hr tablet    GLUCOTROL XL    90 tablet    Take 1 tablet (10 mg) by mouth daily    Type 2 diabetes mellitus with microalbuminuria, without long-term current use of insulin (H)       lisinopril 40 MG tablet    PRINIVIL/ZESTRIL    90 tablet    Take 1 tablet (40 mg) by mouth daily    Benign essential hypertension       MAGNESIUM OXIDE PO           metFORMIN 500 MG tablet    GLUCOPHAGE    360 tablet    Take 2 tablets (1,000 mg) by mouth 2 times daily (with meals)    Type 2 diabetes mellitus with microalbuminuria, without long-term current use of insulin (H)       metroNIDAZOLE 0.75 % topical gel    METROGEL    45 g    Apply topically 2 times daily    Rosacea       mineral oil-white petrolatum Crea cream     454 g    Apply topically 2 times daily as needed for dry skin    Dry skin       OMEGA-3 FISH OIL PO

## 2018-04-23 LAB
ALBUMIN SERPL-MCNC: 4.7 MG/DL (ref 3.3–4.9)
ALP SERPL-CCNC: 66.5 U/L (ref 40–150)
ALT SERPL-CCNC: 38 U/L (ref 0–45)
AST SERPL-CCNC: 32 U/L (ref 0–55)
BILIRUB SERPL-MCNC: 0.4 MG/DL (ref 0.2–1.3)
BILIRUBIN DIRECT: 0.2 MG/DL (ref 0–0.2)
HCV AB SER QL: POSITIVE
PROT SERPL-MCNC: 7.6 G/DL (ref 6.8–8.8)

## 2018-05-02 DIAGNOSIS — B19.20 HEPATITIS C VIRUS INFECTION WITHOUT HEPATIC COMA, UNSPECIFIED CHRONICITY: ICD-10-CM

## 2018-05-07 LAB
HCV RNA QUANT: NORMAL [IU]/ML
LOG OF HCV RNA QT: NORMAL LOG IU/ML

## 2018-06-25 ENCOUNTER — TELEPHONE (OUTPATIENT)
Dept: FAMILY MEDICINE | Facility: CLINIC | Age: 73
End: 2018-06-25

## 2018-06-25 NOTE — TELEPHONE ENCOUNTER
[facility] phone call message- medication clarification/question:    Full Medication Name: metroNIDAZOLE (METROGEL) 0.75 % topical gel   Dose: Apply topically 2 times daily - Topical    Question: This medication is to expensive.  He is wondering if we have samples or what he should do.      Pharmacy confirmed as    [facility] - [place], [place] - [address] [facility] [address] [place] [address] WEST: Yes    OK to leave a message on voice mail? Yes    Primary language: English      needed? No    Call taken on June 25, 2018 at 11:24 AM by Padilla Kate

## 2018-07-17 ENCOUNTER — DOCUMENTATION ONLY (OUTPATIENT)
Dept: VASCULAR SURGERY | Facility: CLINIC | Age: 73
End: 2018-07-17

## 2018-08-06 DIAGNOSIS — I10 BENIGN ESSENTIAL HYPERTENSION: ICD-10-CM

## 2018-08-06 RX ORDER — LISINOPRIL 40 MG/1
40 TABLET ORAL DAILY
Qty: 90 TABLET | Refills: 0 | Status: SHIPPED | OUTPATIENT
Start: 2018-08-06 | End: 2019-01-03

## 2018-09-06 DIAGNOSIS — R80.9 TYPE 2 DIABETES MELLITUS WITH MICROALBUMINURIA, WITHOUT LONG-TERM CURRENT USE OF INSULIN (H): ICD-10-CM

## 2018-09-06 DIAGNOSIS — E11.29 TYPE 2 DIABETES MELLITUS WITH MICROALBUMINURIA, WITHOUT LONG-TERM CURRENT USE OF INSULIN (H): ICD-10-CM

## 2018-10-09 DIAGNOSIS — R80.9 TYPE 2 DIABETES MELLITUS WITH MICROALBUMINURIA, WITHOUT LONG-TERM CURRENT USE OF INSULIN (H): ICD-10-CM

## 2018-10-09 DIAGNOSIS — E11.29 TYPE 2 DIABETES MELLITUS WITH MICROALBUMINURIA, WITHOUT LONG-TERM CURRENT USE OF INSULIN (H): ICD-10-CM

## 2018-10-19 ENCOUNTER — OFFICE VISIT (OUTPATIENT)
Dept: FAMILY MEDICINE | Facility: CLINIC | Age: 73
End: 2018-10-19
Payer: MEDICARE

## 2018-10-19 VITALS
DIASTOLIC BLOOD PRESSURE: 88 MMHG | WEIGHT: 162 LBS | TEMPERATURE: 97.9 F | RESPIRATION RATE: 16 BRPM | HEART RATE: 86 BPM | SYSTOLIC BLOOD PRESSURE: 169 MMHG | OXYGEN SATURATION: 96 % | BODY MASS INDEX: 25.76 KG/M2

## 2018-10-19 DIAGNOSIS — L71.9 ROSACEA: ICD-10-CM

## 2018-10-19 DIAGNOSIS — R80.9 TYPE 2 DIABETES MELLITUS WITH MICROALBUMINURIA, WITHOUT LONG-TERM CURRENT USE OF INSULIN (H): ICD-10-CM

## 2018-10-19 DIAGNOSIS — R50.9 FEVER AND CHILLS: Primary | ICD-10-CM

## 2018-10-19 DIAGNOSIS — E11.29 TYPE 2 DIABETES MELLITUS WITH MICROALBUMINURIA, WITHOUT LONG-TERM CURRENT USE OF INSULIN (H): ICD-10-CM

## 2018-10-19 LAB
FLUAV AG UPPER RESP QL IA.RAPID: NEGATIVE
FLUBV AG UPPER RESP QL IA.RAPID: NEGATIVE
S PYO AG THROAT QL IA.RAPID: NEGATIVE

## 2018-10-19 RX ORDER — IBUPROFEN 800 MG/1
800 TABLET, FILM COATED ORAL EVERY 8 HOURS PRN
Qty: 90 TABLET | Refills: 1 | Status: SHIPPED | OUTPATIENT
Start: 2018-10-19 | End: 2019-08-27

## 2018-10-19 RX ORDER — METRONIDAZOLE 7.5 MG/G
GEL TOPICAL 2 TIMES DAILY
Qty: 45 G | Refills: 11 | Status: SHIPPED | OUTPATIENT
Start: 2018-10-19 | End: 2020-02-03

## 2018-10-19 RX ORDER — METRONIDAZOLE 7.5 MG/G
GEL TOPICAL 2 TIMES DAILY
Qty: 45 G | Refills: 11 | Status: SHIPPED | OUTPATIENT
Start: 2018-10-19 | End: 2019-01-03 | Stop reason: ALTCHOICE

## 2018-10-19 RX ORDER — ATORVASTATIN CALCIUM 40 MG/1
40 TABLET, FILM COATED ORAL DAILY
Qty: 90 TABLET | Refills: 3 | Status: SHIPPED | OUTPATIENT
Start: 2018-10-19 | End: 2019-01-03

## 2018-10-19 NOTE — LETTER
RETURN TO WORK/SCHOOL FORM    10/19/2018    Re: Dennis Blum  1945      To Whom It May Concern:     Dennis Blum was seen in clinic today.  He has a viral infection and should not work.  He may return to work without restrictions on 10/23/18 if you are feeling well and free of fever.          Restrictions:  None      Pavan Christy MD  10/19/2018 10:06 AM

## 2018-10-19 NOTE — MR AVS SNAPSHOT
After Visit Summary   10/19/2018    Dennis Blum    MRN: [id number]           Patient Information     Date Of Birth          1945        Visit Information        Provider Department      10/19/2018 9:20 AM Pavan Christy MD [facility]        Today's Diagnoses     Fever and chills    -  1    Rosacea        Type 2 diabetes mellitus with microalbuminuria, without long-term current use of insulin (H)           Follow-ups after your visit        Future tests that were ordered for you today     Open Future Orders        Priority Expected Expires Ordered    Basic Metabolic Panel ([facility]DAQ) Routine 10/22/2018 10/19/2019 10/19/2018    Lipid Panel ([facility]) Routine 10/22/2018 10/19/2019 10/19/2018    Hemoglobin A1c ([facility]DAQ) Routine 10/22/2018 10/19/2019 10/19/2018            Who to contact     Please call your clinic at [phone] to:    Ask questions about your health    Make or cancel appointments    Discuss your medicines    Learn about your test results    Speak to your doctor            Additional Information About Your Visit        [employer] Information     [employer] gives you secure access to your electronic health record. If you see a primary care provider, you can also send messages to your care team and make appointments. If you have questions, please call your primary care clinic.  If you do not have a primary care provider, please call [phone] and they will assist you.      [employer] is an electronic gateway that provides easy, online access to your medical records. With [employer], you can request a clinic appointment, read your test results, renew a prescription or communicate with your care team.     To access your existing account, please contact your [facility] or call [phone] for assistance.        Care EveryWhere ID     This is your Care EveryWhere ID. This could be used by other organizations to access your [facility]  records  [id number]        Your Vitals Were     Pulse Temperature Respirations Pulse Oximetry BMI (Body Mass Index)       86 97.9  F (36.6  C) (Oral) 16 96% 25.76 kg/m2        Blood Pressure from Last 3 Encounters:   10/19/18 169/88   04/20/18 164/83   10/06/17 (!) 167/96    Weight from Last 3 Encounters:   10/19/18 162 lb (73.5 kg)   04/20/18 159 lb (72.1 kg)   10/06/17 157 lb 12.8 oz (71.6 kg)              We Performed the Following     Influenza A/B Antigen ([facility])     Rapid Strep Screen (Group) ([facility])          Today's Medication Changes          These changes are accurate as of 10/19/18 10:11 AM.  If you have any questions, ask your nurse or doctor.               Start taking these medicines.        Dose/Directions    * metroNIDAZOLE 0.75 % topical gel   Commonly known as:  METROGEL   Used for:  Rosacea   Started by:  Pavan Christy MD        Apply topically 2 times daily   Quantity:  45 g   Refills:  11       * metroNIDAZOLE 0.75 % topical gel   Commonly known as:  METROGEL   Used for:  Rosacea   Started by:  Pavan Christy MD        Apply topically 2 times daily   Quantity:  45 g   Refills:  11       * Notice:  This list has 2 medication(s) that are the same as other medications prescribed for you. Read the directions carefully, and ask your doctor or other care provider to review them with you.      These medicines have changed or have updated prescriptions.        Dose/Directions    * IBUPROFEN PO   This may have changed:  Another medication with the same name was added. Make sure you understand how and when to take each.   Changed by:  Pavan Christy MD        Refills:  0       * ibuprofen 800 MG tablet   Commonly known as:  ADVIL/MOTRIN   This may have changed:  You were already taking a medication with the same name, and this prescription was added. Make sure you understand how and when to take each.   Used for:  Fever and chills   Changed by:  Pavan Christy MD        Dose:  800 mg   Take 1 tablet (800  mg) by mouth every 8 hours as needed for moderate pain   Quantity:  90 tablet   Refills:  1       * Notice:  This list has 2 medication(s) that are the same as other medications prescribed for you. Read the directions carefully, and ask your doctor or other care provider to review them with you.         Where to get your medicines      These medications were sent to [facility] [address] [place] [address]  [address] [place] [address]) [place] [address]     Phone:  [phone]     atorvastatin 40 MG tablet    ibuprofen 800 MG tablet    metroNIDAZOLE 0.75 % topical gel         Some of these will need a paper prescription and others can be bought over the counter.  Ask your nurse if you have questions.     Bring a paper prescription for each of these medications     metroNIDAZOLE 0.75 % topical gel                Primary Care Provider Office Phone # Fax #    Pavan Christy [phone]       [address] [place] [address]        Equal Access to Services     SHAAN RAMOS AH: Hadii gemini yeh hadasho Soomaali, waaxda luqadaha, qaybta kaalmada adeegyada, waxay adelina page. So [facility] [phone].    ATENCIÓN: Si habla español, tiene a mitchell disposición servicios gratuitos de asistencia lingüística. [facility] [phone].    We comply with applicable federal civil rights laws and [place] laws. We do not discriminate on the basis of race, color, national origin, age, disability, sex, sexual orientation, or gender identity.            Thank you!     Thank you for choosing [facility]  for your care. Our goal is always to provide you with excellent care. Hearing back from our patients is one way we can continue to improve our services. Please take a few minutes to complete the written survey that you may receive in the mail after your visit with us. Thank you!             Your Updated Medication List - Protect others around you: Learn how to  safely use, store and throw away your medicines at www.disposemymeds.org.          This list is accurate as of 10/19/18 10:11 AM.  Always use your most recent med list.                   Brand Name Dispense Instructions for use Diagnosis    aspirin 81 MG tablet     90 tablet    Take 1 tablet (81 mg) by mouth daily    Preventative health care       atorvastatin 40 MG tablet    LIPITOR    90 tablet    Take 1 tablet (40 mg) by mouth daily    Type 2 diabetes mellitus with microalbuminuria, without long-term current use of insulin (H)       blood glucose lancets standard    no brand specified    100 each    Use to test blood sugar 2 times daily or as directed.    Type 2 diabetes mellitus with hyperglycemia, without long-term current use of insulin (H)       blood glucose monitoring meter device kit     1 kit    Check fasting glucose 2 day. Can substitute for what is covered by insurance.    Type 2 diabetes mellitus with hyperglycemia (H)       blood glucose monitoring test strip    no brand specified    100 strip    Use to test blood sugars 2 times daily or as directed    Type 2 diabetes mellitus with hyperglycemia, without long-term current use of insulin (H)       glipiZIDE 10 MG 24 hr tablet    GLUCOTROL XL    90 tablet    Take 1 tablet (10 mg) by mouth daily    Type 2 diabetes mellitus with microalbuminuria, without long-term current use of insulin (H)       * IBUPROFEN PO           * ibuprofen 800 MG tablet    ADVIL/MOTRIN    90 tablet    Take 1 tablet (800 mg) by mouth every 8 hours as needed for moderate pain    Fever and chills       lisinopril 40 MG tablet    PRINIVIL/ZESTRIL    90 tablet    Take 1 tablet (40 mg) by mouth daily    Benign essential hypertension       MAGNESIUM OXIDE PO           metFORMIN 500 MG tablet    GLUCOPHAGE    360 tablet    Take 2 tablets (1,000 mg) by mouth 2 times daily (with meals)    Type 2 diabetes mellitus with microalbuminuria, without long-term current use of insulin (H)       *  metroNIDAZOLE 0.75 % topical gel    METROGEL    45 g    Apply topically 2 times daily    Rosacea       * metroNIDAZOLE 0.75 % topical gel    METROGEL    45 g    Apply topically 2 times daily    Rosacea       mineral oil-white petrolatum Crea cream     454 g    Apply topically 2 times daily as needed for dry skin    Dry skin       OMEGA-3 FISH OIL PO           * Notice:  This list has 4 medication(s) that are the same as other medications prescribed for you. Read the directions carefully, and ask your doctor or other care provider to review them with you.

## 2018-10-19 NOTE — PROGRESS NOTES
Dennis is a 73-year-old who attends with a 2-day history of fever, chills, sore throat, slight cough and abdominal discomfort when he coughs.  He denies any nausea vomiting or diarrhea.  He did have some constipation a few weeks ago but this is better.  He reports that his diabetes should be going well.    He was not able to go to work today and requests a note for his 2 employers.    He also requests a refill of the rosacea gel.    ROS:  : No urinary frequency or dysuria.  He adds that his sexual performance is not impaired in any way.  Resp: He does not produce any sputum and denies chest pain  Neuro: No headaches    Objective:  /88 (BP Location: Left arm, Patient Position: Sitting, Cuff Size: Adult Regular)  Pulse 86  Temp 97.9  F (36.6  C) (Oral)  Resp 16  Wt 162 lb (73.5 kg)  SpO2 96%  BMI 25.76 kg/m2  His blood pressure is elevated and he is afebrile.  He does have rosacea affecting his face.  He has a sore throat and does have some pharyngitis visible with a tender left submandibular region but no discrete adenopathy appreciated.  His ear canals are clear and his TMs look normal.  Lungs are clear to auscultation.  Heart sounds are normal.  Abdomen is soft without tenderness or guarding and renal angles are nontender.    Results for orders placed or performed in visit on 10/19/18   Rapid Strep Screen (Group) ([facility])   Result Value Ref Range    Rapid Strep A Screen NEGATIVE Negative   Influenza A/B Antigen ([facility])   Result Value Ref Range    Influenza A NEGATIVE Negative    Influenza B NEGATIVE Negative     Dennis was seen today for pharyngitis, chills, fatigue and refill request.    Diagnoses and all orders for this visit:    Fever and chills  -     Rapid Strep Screen (Group) ([facility])  -     Influenza A/B Antigen ([facility])  -     ibuprofen (ADVIL/MOTRIN) 800 MG tablet; Take 1 tablet (800 mg) by mouth every 8 hours as needed for moderate pain  -     Group A Strep Throat ([facility])    Rosacea  -      metroNIDAZOLE (METROGEL) 0.75 % topical gel; Apply topically 2 times daily  -     metroNIDAZOLE (METROGEL) 0.75 % topical gel; Apply topically 2 times daily    Type 2 diabetes mellitus with microalbuminuria, without long-term current use of insulin (H)  -     atorvastatin (LIPITOR) 40 MG tablet; Take 1 tablet (40 mg) by mouth daily  -     Hemoglobin A1c ([facility]); Future  -     Basic Metabolic Panel ([facility]); Future  -     Lipid Panel ([facility]); Future      We checked both a rapid strep and influenza test both of which results came back negative.  I explained to the patient that if this were the case that its most likely he has another viral illness at this time.  I recommended the usual treatments including ibuprofen.    He is in need of routine labs for his diabetes and agrees to come back to have these drawn.  I refilled his atorvastatin which he has run out of.  We will follow-up with his lab results.

## 2018-10-19 NOTE — PROGRESS NOTES
Can we call Dennis to let him know that the Influenza test and strep test were both negative so it is most likely that this is another virus that should get better within 5-7 days - no antibiotics needed.  Drink liquids, use Ibuprofen every 6-8 hours, rest.  Let us know if not getting better - D Power

## 2018-10-20 LAB — GROUP A STREP,THROAT: NORMAL

## 2019-01-03 ENCOUNTER — OFFICE VISIT (OUTPATIENT)
Dept: FAMILY MEDICINE | Facility: CLINIC | Age: 74
End: 2019-01-03
Payer: MEDICARE

## 2019-01-03 VITALS
BODY MASS INDEX: 25.21 KG/M2 | HEIGHT: 67 IN | TEMPERATURE: 97.7 F | DIASTOLIC BLOOD PRESSURE: 98 MMHG | SYSTOLIC BLOOD PRESSURE: 197 MMHG | WEIGHT: 160.6 LBS | RESPIRATION RATE: 20 BRPM | HEART RATE: 78 BPM | OXYGEN SATURATION: 99 %

## 2019-01-03 DIAGNOSIS — Z23 NEED FOR VACCINATION: ICD-10-CM

## 2019-01-03 DIAGNOSIS — H00.15 CHALAZION LEFT LOWER EYELID: ICD-10-CM

## 2019-01-03 DIAGNOSIS — R01.1 SYSTOLIC MURMUR: ICD-10-CM

## 2019-01-03 DIAGNOSIS — E11.65 TYPE 2 DIABETES MELLITUS WITH HYPERGLYCEMIA, WITHOUT LONG-TERM CURRENT USE OF INSULIN (H): ICD-10-CM

## 2019-01-03 DIAGNOSIS — N18.30 CKD (CHRONIC KIDNEY DISEASE) STAGE 3, GFR 30-59 ML/MIN (H): ICD-10-CM

## 2019-01-03 DIAGNOSIS — L71.9 ROSACEA: ICD-10-CM

## 2019-01-03 DIAGNOSIS — E11.29 TYPE 2 DIABETES MELLITUS WITH MICROALBUMINURIA, WITHOUT LONG-TERM CURRENT USE OF INSULIN (H): ICD-10-CM

## 2019-01-03 DIAGNOSIS — E11.65 TYPE 2 DIABETES MELLITUS WITH HYPERGLYCEMIA, WITHOUT LONG-TERM CURRENT USE OF INSULIN (H): Primary | ICD-10-CM

## 2019-01-03 DIAGNOSIS — Z65.3 PROBLEMS RELATED TO OTHER LEGAL CIRCUMSTANCES: ICD-10-CM

## 2019-01-03 DIAGNOSIS — Z00.00 PREVENTATIVE HEALTH CARE: ICD-10-CM

## 2019-01-03 DIAGNOSIS — R80.9 TYPE 2 DIABETES MELLITUS WITH MICROALBUMINURIA, WITHOUT LONG-TERM CURRENT USE OF INSULIN (H): ICD-10-CM

## 2019-01-03 DIAGNOSIS — N18.30 CKD (CHRONIC KIDNEY DISEASE) STAGE 3, GFR 30-59 ML/MIN (H): Primary | ICD-10-CM

## 2019-01-03 DIAGNOSIS — I10 BENIGN ESSENTIAL HYPERTENSION: ICD-10-CM

## 2019-01-03 LAB
BUN SERPL-MCNC: 22 MG/DL (ref 7–21)
CALCIUM SERPL-MCNC: 9.1 MG/DL (ref 8.5–10.1)
CHLORIDE SERPLBLD-SCNC: 105.3 MMOL/L (ref 98–110)
CHOLEST SERPL-MCNC: 228.1 MG/DL (ref 0–200)
CHOLEST/HDLC SERPL: 5.4 {RATIO} (ref 0–5)
CO2 SERPL-SCNC: 27.1 MMOL/L (ref 20–32)
CREAT SERPL-MCNC: 1.3 MG/DL (ref 0.7–1.3)
CREAT UR-MCNC: 131.9 MG/DL
GFR SERPL CREATININE-BSD FRML MDRD: 57.8 ML/MIN/1.7 M2
GLUCOSE SERPL-MCNC: 249.2 MG'DL (ref 70–99)
HBA1C MFR BLD: 8.7 % (ref 4.1–5.7)
HDLC SERPL-MCNC: 42.5 MG/DL
LDLC SERPL CALC-MCNC: 164 MG/DL (ref 0–129)
MICROALBUMIN UR-MCNC: 191.77 MG/DL (ref 0–1.99)
MICROALBUMIN/CREAT UR: 1453.9 MG/G
POTASSIUM SERPL-SCNC: 4.6 MMOL/DL (ref 3.2–4.6)
SODIUM SERPL-SCNC: 139.1 MMOL/L (ref 132–142)
TRIGL SERPL-MCNC: 108.7 MG/DL (ref 0–150)
TSH SERPL DL<=0.05 MIU/L-ACNC: 4.28 UIU/ML (ref 0.3–5)
VLDL CHOLESTEROL: 21.7 MG/DL (ref 7–32)

## 2019-01-03 RX ORDER — ATORVASTATIN CALCIUM 40 MG/1
40 TABLET, FILM COATED ORAL DAILY
Qty: 90 TABLET | Refills: 3 | Status: SHIPPED | OUTPATIENT
Start: 2019-01-03 | End: 2019-07-30

## 2019-01-03 RX ORDER — LISINOPRIL 40 MG/1
40 TABLET ORAL DAILY
Qty: 90 TABLET | Refills: 3 | Status: SHIPPED | OUTPATIENT
Start: 2019-01-03 | End: 2019-07-30

## 2019-01-03 RX ORDER — DOXYCYCLINE 100 MG/1
100 CAPSULE ORAL 2 TIMES DAILY
Qty: 180 CAPSULE | Refills: 3 | Status: SHIPPED | OUTPATIENT
Start: 2019-01-03 | End: 2019-08-27

## 2019-01-03 RX ORDER — GLIPIZIDE 10 MG/1
20 TABLET, FILM COATED, EXTENDED RELEASE ORAL DAILY
Qty: 180 TABLET | Refills: 3 | Status: SHIPPED | OUTPATIENT
Start: 2019-01-03 | End: 2020-01-16

## 2019-01-03 RX ORDER — ERYTHROMYCIN 5 MG/G
0.5 OINTMENT OPHTHALMIC 3 TIMES DAILY
Qty: 3.5 G | Refills: 0 | Status: SHIPPED | OUTPATIENT
Start: 2019-01-03 | End: 2019-01-08

## 2019-01-03 ASSESSMENT — MIFFLIN-ST. JEOR: SCORE: 1442.11

## 2019-01-03 NOTE — LETTER
January 4, 2019      Dennis Blum  [address] [place] [address]        Dear Dennis,    In addition to what we discussed in clinic today, the amount of protein you are losing in your urine (not a good thing - we want to hold on to our protein) has increased - double what it was 3 years ago.  This is another benefit to Lisinopril - and probably the increase is in part due to you having stopped it.  Getting your blood sugars down to goal also helps reduce protein loss.       Take care! - and let's plan to recheck in 3 months - or sooner if you have other concerns.   Please see below for your test results.    Resulted Orders   Thyroid [place] ([facility]AG)   Result Value Ref Range    TSH 4.28 0.30 - 5.00 uIU/mL    Narrative    Test performed by:  [facility]  [address] [place] [address]   Microalbumin Random Ur ([facility])   Result Value Ref Range    Microalbumin, Urine 191.77 (H) 0.00 - 1.99 mg/dL    Creatinine, Urine 131.9 mg/dL    Albumin Urine mg/g Cr 1,453.9 (H) <=19.9 mg/g    Narrative    Test performed by:  [facility]  [address] [place] [address]  Microalbumin, Random Urine  <2.0 mg/dL . . . . . . . . Normal  3.0-30.0 mg/dL . . . . . . Microalbuminuria  >30.0 mg/dL . . . . . .  . Clinical Proteinuria  Microalbumin/Creatinine Ratio, Random Urine  <20 mg/g . . . . .. . . . Normal   mg/g . . . . . . . Microalbuminuria  >300 mg/g . . . . . . . . Clinical Proteinuria   Lipid Panel ([facility])   Result Value Ref Range    Cholesterol 228.1 (H) 0.0 - 200.0 mg/dL    Cholesterol/HDL Ratio 5.4 (H) 0.0 - 5.0    HDL Cholesterol 42.5 >40.0 mg/dL    LDL Cholesterol Calculated 164 (H) 0 - 129 mg/dL    Triglycerides 108.7 0.0 - 150.0 mg/dL    VLDL Cholesterol 21.7 7.0 - 32.0 mg/dL   Basic Metabolic Panel ([facility])   Result Value Ref Range    Urea Nitrogen 22.0 (H) 7.0 - 21.0 mg/dL    Calcium 9.1 8.5 - 10.1 mg/dL    Chloride 105.3 98.0 - 110.0 mmol/L    Carbon  Dioxide 27.1 20.0 - 32.0 mmol/L    Creatinine 1.3 0.7 - 1.3 mg/dL    Glucose 249.2 (H) 70.0 - 99.0 mg'dL    Potassium 4.6 3.2 - 4.6 mmol/dL    Sodium 139.1 132.0 - 142.0 mmol/L    GFR Estimate 57.8 (L) >60.0 mL/min/1.7 m2    GFR Estimate If Black 70.0 >60.0 mL/min/1.7 m2   Hemoglobin A1c ([facility])   Result Value Ref Range    Hemoglobin A1C 8.7 (H) 4.1 - 5.7 %       If you have any questions, please call the clinic to make an appointment.    Sincerely,    Pavan Christy MD

## 2019-01-03 NOTE — PROGRESS NOTES
Social Work Note:    Data and Intervention: JERE met with patient, per , to discuss options for prescription drug insurance coverage. SW met with patient and his partner. He states that he has not had coverage for his Rx's. He has looked at various options in the past but has not committed to paying the prices that he was given. Patient has not pursued Medicaid options and is not sure he is eligible. He states he is working and also recieves SSDI. He estimate that his monthly income combined is about $1,800 per month.     JERE informed patient that he can attempt to apply for MA supplement. That could potentially assist with Medicare copays, which he indicates is about $130 per month, and/or MA supplement that would cover the cost of medications. Provided [facility] with [facility] agencies to do that application. He plans to pursue this. SW also provided a list of private pay part D supplements for his zip code area. Those options included Aetna, Humana, Silver Script, Well Care, AARP [facility], and Express Script. Resources gathered from Medicare website. Indicated that these would be leads but are not an exhaustive list. There might be other carriers out there. Will require further research on patient's end which he is willing to do.    JERE provided information for the Senior Linkage Line. Highly encouraged patient to reach out to them for further insurance consultation. He was appreciative of this and plans to do so. He is not 100% committed to purchases a prescription coverage plan but will review his options and consider.    Assessment and Plan:     1.) SW provided brief counseling for Rx drug coverage options and resources. Patient will follow up on the resources as he feels capable and able to navigate the system to do so.     Shoshana Sears

## 2019-01-03 NOTE — RESULT ENCOUNTER NOTE
Checo Collins, in addition to what we discussed in clinic today, the amount of protein you are losing in your urine (not a good thing - we want to hold on to our protein) has increased - double what it was 3 years ago.  This is another benefit to Lisinopril - and probably the increase is in part due to you having stopped it.  Getting your blood sugars down to goal also helps reduce protein loss.      Take care! - and let's plan to recheck in 3 months - or sooner if you have other concerns.  Pavan Christy
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
## 2019-01-03 NOTE — PROGRESS NOTES
Discussed medication costs with patient. He does not have prescription drug coverage currently.  Dr Rayo, who is on pharmacy with me, looked up the medications and found that all of his medications would be least expensive through [facility] pharmacy and the use of a GoodRx card. Exception was that Metrogel was still quite expensive.  Doxycline, an oral option for his rosacea, was a less expensive option that patient was willing to try.      Dr Christy to send rxs to [facility] [place].    Inga Jim, Pharm.D.

## 2019-01-03 NOTE — PATIENT INSTRUCTIONS
1. We will re-schedule the ECHO scan for your heart murmur    2. The lump behind your ear if you want it removed, I will need to refer you to a surgeon    3. I referred you to an eye doctor - discuss the lump below the left eye if it is still present.    Legal Services Referral - [place] only  Legal referral has been sent to Prec[facility]re[facility].  She will review, advise, and contact the patient.  [facility]/January 4, 2019    OPHTHALMOLOGY ADULT REFERRAL  [place] [address]    Appointment:  Tuesday January 8, 2019  Arrival Time:  3:30 pm   Provider:  Dr. Jean    This appointment will be specifically to address your and after this appointment they will assist you with scheduling for your Diabetic/Yearly Eye Exam    Please bring a copy of your insurance card and photo ID    If you cannot make this appointment, please contact [phone] to reschedule    January 4, 2019 at 1:38 pm spoke with Dennis regarding appointment details - also called back and left the detailed information on his voicemail per his request.  [facility]    Echocardiogram Complete  January 4, 2019 at 1:44 pm Demographics, referral, office notes, and labs faxed to [facility] [place] at [phone] who will contact patient for scheduling. [facility]  Phone: [phone]  Fax: [phone]

## 2019-01-07 ENCOUNTER — RECORDS - HEALTHEAST (OUTPATIENT)
Dept: ADMINISTRATIVE | Facility: OTHER | Age: 74
End: 2019-01-07

## 2019-07-22 ENCOUNTER — TRANSFERRED RECORDS (OUTPATIENT)
Dept: HEALTH INFORMATION MANAGEMENT | Facility: CLINIC | Age: 74
End: 2019-07-22

## 2019-07-30 ENCOUNTER — OFFICE VISIT (OUTPATIENT)
Dept: FAMILY MEDICINE | Facility: CLINIC | Age: 74
End: 2019-07-30
Payer: MEDICARE

## 2019-07-30 VITALS
RESPIRATION RATE: 16 BRPM | SYSTOLIC BLOOD PRESSURE: 176 MMHG | WEIGHT: 158.2 LBS | TEMPERATURE: 97.9 F | DIASTOLIC BLOOD PRESSURE: 92 MMHG | BODY MASS INDEX: 25.43 KG/M2 | HEART RATE: 78 BPM | HEIGHT: 66 IN

## 2019-07-30 DIAGNOSIS — R80.9 TYPE 2 DIABETES MELLITUS WITH MICROALBUMINURIA, WITHOUT LONG-TERM CURRENT USE OF INSULIN (H): ICD-10-CM

## 2019-07-30 DIAGNOSIS — Z01.818 PREOP GENERAL PHYSICAL EXAM: Primary | ICD-10-CM

## 2019-07-30 DIAGNOSIS — I10 BENIGN ESSENTIAL HYPERTENSION: ICD-10-CM

## 2019-07-30 DIAGNOSIS — E11.29 TYPE 2 DIABETES MELLITUS WITH MICROALBUMINURIA, WITHOUT LONG-TERM CURRENT USE OF INSULIN (H): ICD-10-CM

## 2019-07-30 RX ORDER — LISINOPRIL 40 MG/1
40 TABLET ORAL DAILY
Qty: 90 TABLET | Refills: 0 | Status: SHIPPED | OUTPATIENT
Start: 2019-07-30 | End: 2020-02-03

## 2019-07-30 RX ORDER — ATORVASTATIN CALCIUM 40 MG/1
40 TABLET, FILM COATED ORAL DAILY
Qty: 90 TABLET | Refills: 0 | Status: SHIPPED | OUTPATIENT
Start: 2019-07-30 | End: 2020-02-03

## 2019-07-30 ASSESSMENT — MIFFLIN-ST. JEOR: SCORE: 1414.31

## 2019-07-30 NOTE — PROGRESS NOTES
Preoperative History and Physical Examination  Date of Examination: 7/30/2019   Proposed Surgery: Growth removal, Lower Left Eyelid  Surgeon: Patient cannot recall name, happening in [place]   Date of Surgery: 7/31/2019  Location of Surgery:  [place]  Fax Number for H&P: [phone] (provided by patient)  Mr. Eduardo Blum's surgeon has asked to have Dennis Blum see us for a pre-operative evaluation for the above-mentioned procedure. He  is otherwise in his usual state of health.   Indication for Surgery:Previous diagnostic and therapeutic evaluation   Patient has had several BCC/SCC removals on face other last years. Has had a growth under his left eyelid for some months now which is being taken off tomorrow morning. Has not been bothersome but concerning due to his history. 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 No recent illness. Is not SOB at baseline or with activity. Does not smoke, occasionally drinks EtOH but is not a habit.    Patient would also like refill of his Lipitor and Lisinopril - has been compliant with medications but did not have refills available on these.    Past Medical History:   Past Medical History:   Diagnosis Date     Diabetes (H)      Hypertension       Patient Active Problem List   Diagnosis     Hypertension     Diabetes mellitus, type 2 (H)     CKD (chronic kidney disease) stage 3, GFR 30-59 ml/min (H)     Dyslipidemia (high LDL; low HDL)     Systolic murmur      Current Outpatient Medications   Medication     glipiZIDE (GLUCOTROL XL) 10 MG 24 hr tablet     lisinopril (PRINIVIL/ZESTRIL) 40 MG tablet     metFORMIN (GLUCOPHAGE) 500 MG tablet     Omega-3 Fatty Acids (OMEGA-3 FISH OIL PO)     aspirin (ASA) 81 MG tablet     atorvastatin (LIPITOR) 40 MG tablet     blood glucose (NO BRAND SPECIFIED) lancets standard     blood glucose monitoring (ANTONELLA CONTOUR MONITOR) meter device kit     blood glucose monitoring (NO BRAND  SPECIFIED) test strip     doxycycline hyclate (VIBRAMYCIN) 100 MG capsule     ibuprofen (ADVIL/MOTRIN) 800 MG tablet     IBUPROFEN PO     MAGNESIUM OXIDE PO     metroNIDAZOLE (METROGEL) 0.75 % topical gel     mineral oil-white petrolatum (MINERIN, EUCERIN) CREA     No current facility-administered medications for this visit.       No Known Allergies     PAST SURGICAL HISTORY:   No past surgical history on file.     SURGICAL EVALUATION QUESTIONS:  Any more short of breath on exertion than other people of your age? No   Do you have any chest pain on exertion (anginal type)? No   Personal/Family History of Anesthetic Reaction? No   Personal/Family History of easy bruising/bleeding disorder? No   Personal History of Snoring/Sleep Apnea? No, Patient snores but does not wear a mask  Personal/Family History of VTE/PE? No   Current Aspirin Use? Yes - though not regularly taking   Recent Steroid Use? No     Other Significant Family History:   Family History   Problem Relation Age of Onset     Diabetes Mother      Diabetes Father      Coronary Artery Disease No family hx of      Cancer No family hx of      Hypertension No family hx of      Hyperlipidemia No family hx of      Cerebrovascular Disease No family hx of      Breast Cancer No family hx of      Colon Cancer No family hx of      Prostate Cancer No family hx of      Other Cancer No family hx of      Depression No family hx of      Anxiety Disorder No family hx of      Mental Illness No family hx of      Substance Abuse No family hx of      Anesthesia Reaction No family hx of      Asthma No family hx of      Osteoporosis No family hx of      Genetic Disorder No family hx of      Thyroid Disease No family hx of      Obesity No family hx of      Unknown/Adopted No family hx of       Social History     Socioeconomic History     Marital status: Single     Spouse name: None     Number of children: None     Years of education: None     Highest education level: None  "  Occupational History     None   Social Needs     Financial resource strain: None     Food insecurity:     Worry: None     Inability: None     Transportation needs:     Medical: None     Non-medical: None   Tobacco Use     Smoking status: Never Smoker     Smokeless tobacco: Never Used   Substance and Sexual Activity     Alcohol use: Yes     Drug use: No     Sexual activity: Yes   Lifestyle     Physical activity:     Days per week: None     Minutes per session: None     Stress: None   Relationships     Social connections:     Talks on phone: None     Gets together: None     Attends Spiritism service: None     Active member of club or organization: None     Attends meetings of clubs or organizations: None     Relationship status: None     Intimate partner violence:     Fear of current or ex partner: None     Emotionally abused: None     Physically abused: None     Forced sexual activity: None   Other Topics Concern     None   Social History Narrative     None     Review of Systems -   General: No fevers, chills, weight loss  Head: No headaches or recent trauma  Eyes: No acute change in vision or other eye disease  Ears: No acute change in hearing  Oropharynx: No sore throat or URI symptoms   CV: No chest pain or palpitations.  Resp: No shortness of breath or cough. No hemoptysis.  GI: No nausea, vomiting, constipation, diarrhea, melena or abdominal pain  : No urinary pain, change in color, or frequency   MS: No arthralgias or myalgias  Psych: No significant change in mood, anxiety level   Skin: Growth under eyelid being removed for surgery, otherwise no new changes  Neuro: No alterations in thinking, paresthesias, or weakness     OBJECTIVE:  BP (!) 176/92   Pulse 78   Temp 97.9  F (36.6  C)   Resp 16   Ht 1.683 m (5' 6.25\")   Wt 71.8 kg (158 lb 3.2 oz)   BMI 25.34 kg/m         General - healthy, alert and no distress    Head - Normocephalic, atraumatic.    Eyes - Pupils are equal, round and reactive to light " bilaterally.  Extraocular movements are intact bilaterally. Sclera and conjunctiva clear. 2 cm diameter erythematous raised growth on left lower lid with visible blood vessels. Appears shiny.    Ears - Tympanic membranes clear bilaterally. External canals without lesion.    Mouth - oropharynx is clear without exudates.    Neck - no cervical adenopathy, thyromegaly noted.    Lungs - Clear to auscultation bilaterally, no wheezes, rales or rhonchi.    CV - Regular rate and rhythm, 2/6 systolic murmur noted.    Abdomen - Non-tender, non-distended, positive bowel sounds, no masses, no hepatosplenomegaly. Diastasis recti noted. No rebound or guarding.     Upper Extremities - No edema or deformities.    Lower Extremities - No edema. Dorsalis pedis pulses strong bilaterally. Cap refill is brisk bilaterally.    Skin - warm, dry, intact. No rashes or erythema.    Neurologic - No focal deficit noted, Pateller, achilles, biceps and brachioradialis reflexes intact. Muscle tone, bulk within normal limits throughout.    Psych - Judgment and mental status are clear, patient has reasonable insight. Mood is stable.    No results found for this or any previous visit (from the past 24 hour(s)).    EKG: not indicated, low risk  Labs: No labs needed today, patient had normal BMP 6 months ago and is not on any anticoagulation or at bleeding risk  CXR: not indicated    RECOMMENDATIONS:  1) Take his usual medications except for glipizide, metformin and aspirin on the morningof surgery with a sip of water.  2) Refills of lisinopril and atorvastatin faxed to pharmacy  3) Follow up in clinic as needed for his chronic medical conditions including diabetes, HTN, and HLD.    Dennis Blum is a 74 year old male here for preoperative evaluation.  Approval given to proceed with proposed procedure, without further diagnostic evaluation.       Please see the above history and physical on our mutual patient, Dennis Blum. Thank you for  allowing me to participate in the pre operative consultation of this patient.     Tanya Cabrales MD PGY1  [facility]    Discussed with [facility] Medicine Preceptor Dr. Martinez, who is in agreement with the plan.    [address] [address]  [phone]  Dept: [phone]

## 2019-07-30 NOTE — PATIENT INSTRUCTIONS
Do not take your glipizide or Metformin tomorrow morning.  Do not take any aspirin tomorrow morning.  Do not eat or drink anything after midnight tonight.    It was nice meeting you today!  Before Your Surgery      Call your surgeon if there is any change in your health. This includes signs of a cold or flu (such as a sore throat, runny nose, cough, rash or fever).    Do not smoke, drink alcohol or take over the counter medicine (unless your surgeon or primary care doctor tells you to) for the 24 hours before and after surgery.    If you take prescribed drugs: Follow your doctor s orders about which medicines to take and which to stop until after surgery.    Eating and drinking prior to surgery: follow the instructions from your surgeon    Take a shower or bath the night before surgery. Use the soap your surgeon gave you to gently clean your skin. If you do not have soap from your surgeon, use your regular soap. Do not shave or scrub the surgery site.  Wear clean pajamas and have clean sheets on your bed.

## 2019-07-30 NOTE — PROGRESS NOTES
Preceptor Attestation:   Patient seen, evaluated and discussed with the resident. I have verified the content of the note, which accurately reflects my assessment of the patient and the plan of care.   Supervising Physician:  Rafael Martinez MD

## 2019-08-26 NOTE — PROGRESS NOTES
[address] [address]  Phone: [phone]  Fax: [phone]    8/27/2019    Adult PRE-OP Evaluation:    Dennis Blum, 1945 presents for pre-operative evaluation and assessment as requested by Dr. Christine Mace, prior to undergoing surgery/procedure for treatment of removal of left eye sutures after extraction of basal cell carcinoma in July, 2019.     Proposed procedure: Left eye     Date of Surgery/ Procedure: 8/30/2019  Hospital/Surgical Facility: [facility], Dr. Christine Mace Fax number: [phone]     Primary Physician: Pavan Christy  History of anesthesia complications: NONE  History of  abnormal bleeding: NONE   History of blood transfusions: NO  Patient has a Health Care Directive or Living Will:  YES, instructed to bring on surgery day    Preoperative Questions   1. NO - Do you have a history of heart attack, stroke, stent, bypass or surgery on an artery in the head, neck, heart or legs?  2. NO- Do you ever have any pain or discomfort in your chest?  3. NO - Have you ever had a severe pain across the front of your chest lasting for half an hour or more?  4. NO - Do you have a history of Congestive Heart Failure?  5. NO - Are you troubled by shortness of breath when: walking on the level/ up a slight hill/ at night?  6. NO - Does your chest ever sound wheezy or whistling?  7. NO - Do you currently have a cold, bronchitis or other respiratory infection?  8. NO  - Have you had a cold, bronchitis or other respiratory infection within the last 2 weeks?  9. NO - Do you usually have a cough?  10. NO - Do you sometimes get pains in the calves of your legs when you walk?  11. NO - Do you or anyone in your family have previous history of blood clots?  12. NO - Do you or does anyone in your family have a serious bleeding problem such as prolonged bleeding following surgeries or cuts?  13. NO - Have you ever had problems with anemia or been told to take iron pills?  14.  NO - Have you had any abnormal blood loss such as black, tarry or bloody stools, or abnormal vaginal bleeding?  15. NO- Have you ever had a blood transfusion?  16. NO - Have you or any of your relatives ever had problems with anesthesia?  17. NO - Do you have sleep apnea, excessive snoring or daytime drowsiness?  18. NO - Do you have any prosthetic heart valves?  19. NO - Do you have prosthetic joints?  20. NA - Is there any chance that you may be pregnant?    Patient Active Problem List   Diagnosis     Hypertension     Diabetes mellitus, type 2 (H)     CKD (chronic kidney disease) stage 3, GFR 30-59 ml/min (H)     Dyslipidemia (high LDL; low HDL)     Systolic murmur         Current Outpatient Medications on File Prior to Visit:  glipiZIDE (GLUCOTROL XL) 10 MG 24 hr tablet Take 2 tablets (20 mg) by mouth daily   lisinopril (PRINIVIL/ZESTRIL) 40 MG tablet Take 1 tablet (40 mg) by mouth daily   metFORMIN (GLUCOPHAGE) 500 MG tablet Take 2 tablets (1,000 mg) by mouth 2 times daily (with meals)   aspirin (ASA) 81 MG tablet Take 1 tablet (81 mg) by mouth daily (Patient not taking: Reported on 7/30/2019)   atorvastatin (LIPITOR) 40 MG tablet Take 1 tablet (40 mg) by mouth daily (Patient not taking: Reported on 8/27/2019)   blood glucose (NO BRAND SPECIFIED) lancets standard Use to test blood sugar 2 times daily or as directed. (Patient not taking: Reported on 8/27/2019)   MAGNESIUM OXIDE PO    metroNIDAZOLE (METROGEL) 0.75 % topical gel Apply topically 2 times daily (Patient not taking: Reported on 8/27/2019)   Omega-3 Fatty Acids (OMEGA-3 FISH OIL PO)      No current facility-administered medications on file prior to visit.     OTC products: None, except as noted above    No Known Allergies  Latex Allergy: NO    Social History     Socioeconomic History     Marital status: Single     Spouse name: Not on file     Number of children: Not on file     Years of education: Not on file     Highest education level: Not on file    Occupational History     Not on file   Social Needs     Financial resource strain: Not on file     Food insecurity:     Worry: Not on file     Inability: Not on file     Transportation needs:     Medical: Not on file     Non-medical: Not on file   Tobacco Use     Smoking status: Never Smoker     Smokeless tobacco: Never Used   Substance and Sexual Activity     Alcohol use: Yes     Drug use: No     Sexual activity: Yes   Lifestyle     Physical activity:     Days per week: Not on file     Minutes per session: Not on file     Stress: Not on file   Relationships     Social connections:     Talks on phone: Not on file     Gets together: Not on file     Attends Confucianism service: Not on file     Active member of club or organization: Not on file     Attends meetings of clubs or organizations: Not on file     Relationship status: Not on file     Intimate partner violence:     Fear of current or ex partner: Not on file     Emotionally abused: Not on file     Physically abused: Not on file     Forced sexual activity: Not on file   Other Topics Concern     Not on file   Social History Narrative     Not on file       REVIEW OF SYSTEMS:   Review Of Systems  Skin: negative  Eyes: Left eye closed, no vision changes in right eye.   Ears/Nose/Throat: negative  Respiratory: No shortness of breath, dyspnea on exertion, cough, or hemoptysis  Cardiovascular: negative for and exertional chest pain or pressure  Gastrointestinal: negative for, nausea, vomiting, abdominal pain, melena, hematochezia, constipation and diarrhea  Genitourinary: negative for, dysuria and frequency  Musculoskeletal: negative for, joint pain and joint swelling  Neurologic: negative for, headaches, numbness or tingling of hands and numbness or tingling of feet  Psychiatric: negative for, anxiety and depression  Hematologic/Lymphatic/Immunologic: negative for, anemia and bleeding disorder  Endocrine: negative for, cold intolerance, heat intolerance, polyphagia,  "polydipsia and polyuria      EXAM:     Patient Vitals for the past 24 hrs:   BP Temp Pulse Resp Height Weight   08/27/19 1045 (!) 146/84 -- -- -- -- --   08/27/19 1011 (!) 161/84 -- 77 -- -- --   08/27/19 1009 (!) 164/87 98.2  F (36.8  C) 81 20 1.676 m (5' 6\") 72.5 kg (159 lb 12.8 oz)     Body mass index is 25.79 kg/m .     GENERAL: healthy, alert and no distress  EYES: Left eye closed and edematous without significant edema. Right eye EOMI and pupil reactive.   HENT: ear canals- normal; TMs- normal; Nose- normal; Mouth- no ulcers, no lesions  NECK: no tenderness, no adenopathy, no asymmetry, no masses, no stiffness; thyroid- normal to palpation  RESP: lungs clear to auscultation - no rales, no rhonchi, no wheezes  CV: regular rates and rhythm, normal S1 S2, no S3 or S4 and 3/6 systolic murmur, loudest in RUSB.  ABDOMEN: soft, no tenderness, no  hepatosplenomegaly, no masses, normal bowel sounds  MS: extremities- no gross deformities noted, no edema  SKIN: Skin lesion behind left posterior ear with covered with steri-strips c/d/i  NEURO: strength and tone- normal, sensory exam- grossly normal, mentation- intact, speech- normal, reflexes- symmetric  BACK: no CVA tenderness, no paralumbar tenderness  PSYCH: Alert and oriented times 3; speech- coherent , normal rate and volume; able to articulate logical thoughts  LYMPHATICS: ant. cervical- normal, post. cervical- normal     DIAGNOSTICS:      No labs today as low risk procedure    Labs Drawn and in Process:   Unresulted Labs Ordered in the Past 30 Days of this Admission     No orders found from 7/28/2019 to 8/28/2019.          RISK ASSESSMENT:     Cardiovascular Risk:  -Patient is able to walk up a flight of stairs without chest pain.  -The patient does not have chest pain with exertion.  -Patient does not have a history of congestive heart failure.    -The patient does not have a history of stroke and does not have a known history of valvular disease. However, patient " does have a history of a systolic murmur concerning for valvular disease, Order for echo placed in January 2019, but not yet completed.  -Patient does have a history of type 2 diabetes, not currently at goal     Pulmonary Risk:  -In terms of risk factors for pulmonary complication, the patient has no risk factors    Perioperative Complications:  -The patient does not have a history of bleeding or clotting problems in the past.    -The patient has not had complications from surgeries.    -The patient does not have a family history of any anesthesia or surgical complications.      IMPRESSION:   Reason for surgery/procedure: Removal of sutures from left eye and post-op eval from 7/30/19.     The proposed surgical procedure is considered LOW risk.    For above listed surgery and anesthesia:   Patient is at moderate-high risk for surgery/procedure and perioperative/procedure complications.    RECOMMENDATIONS:     Labs:  No labs today    Fasting:  NPO for 12 hours prior to surgery    Preop Plan:  --Approval given to proceed with proposed procedure, without further diagnostic evaluation as procedure is low risk  --Patient is moderate to high risk for procedure  --Needs outpatient follow up of heart murmur, diabetes and hypertension      Medications:  Patient should take their regular medications the morning of surgery unless otherwise instructed.  Hold glipizide, metformin, lisinopril the morning of surgery. Stop aspirin and NSAIDs 7 days prior to surgery.      Anna Marie Fairchild MD    Patient discussed with attending physician, Dr. Ricardo Restrepo      Please contact our office if there are any further questions or information required about this patient.

## 2019-08-27 ENCOUNTER — OFFICE VISIT (OUTPATIENT)
Dept: FAMILY MEDICINE | Facility: CLINIC | Age: 74
End: 2019-08-27
Payer: MEDICARE

## 2019-08-27 VITALS
WEIGHT: 159.8 LBS | RESPIRATION RATE: 20 BRPM | DIASTOLIC BLOOD PRESSURE: 84 MMHG | HEART RATE: 77 BPM | HEIGHT: 66 IN | BODY MASS INDEX: 25.68 KG/M2 | TEMPERATURE: 98.2 F | SYSTOLIC BLOOD PRESSURE: 146 MMHG

## 2019-08-27 DIAGNOSIS — Z01.818 PREOP GENERAL PHYSICAL EXAM: Primary | ICD-10-CM

## 2019-08-27 ASSESSMENT — MIFFLIN-ST. JEOR: SCORE: 1417.6

## 2019-08-27 NOTE — PATIENT INSTRUCTIONS
- Hold glipizide, metformin and lisinopril the morning of surgery  - Need to follow up in 1-2 weeks to further discuss diabetes and blood pressure  - Need echocardiogram, order has been placed   Presurgery Checklist  You are scheduled to have surgery. The healthcare staff will try to make your stay comfortable. Use the guidelines below to remind yourself what to do before surgery. Be sure to follow any specific pre-op instructions from your surgeon or nurse.   Preparing for Surgery  Ask your surgeon if you ll need a blood transfusion during surgery and if so, how to prepare for it. In some cases, you can donate blood before surgery. If needed, this blood can be given back (transfused) to you during or after surgery.  If you are having abdominal surgery, ask what you need to do to clear your bowel.  Tell your surgeon if you have allergies to any medications or foods.  Arrange for an adult family member or friend to drive you home after surgery. If possible, have someone ready to help you at home as you recover.  Call the surgeon if you get a cold, fever, sore throat, diarrhea, or other health problem just before surgery. Your surgeon can decide whether or not to postpone the surgery.  Medications  Tell your surgeon about all medications you take, including prescription and over-the-counter products such as herbal remedies and vitamins. Ask if you should continue taking them.  If you take ibuprofen, naproxen, or  blood thinners  such as aspirin, clopidogrel (Plavix), or warfarin (Coumadin), ask your surgeon whether you should stop taking them and how long before surgery you should stop.  You may be told to take antibiotics just before surgery to prevent infection. If so, follow instructions carefully on how to take them.  If you are told to take medications called anticoagulants to prevent blood clots after surgery, be sure to follow the instructions on how to take them.  Stop Smoking  If you smoke, healing may take  longer. So at least 2 week(s) before surgery, stop smoking.  Bathing or Showering Before Surgery  If instructed, wash with antibacterial soap. Afterward, do not use lotions or powders.  If you are having surgery on the head, you may be asked to shampoo with antibacterial soap. Follow instructions for doing so.  Do Not Remove Hair from the Surgery Site  Do not shave hair from the incision site, unless you are given specific instructions to do so. Usually, if hair needs to be removed, it will be done at the hospital right before surgery.  Don t Eat or Drink  Your doctor will tell you when to stop eating and drinking. If you do not follow your doctor's instructions, your procedure may be postponed or rescheduled for another day.  If your surgeon tells you to continue any medications, take them with small sips of water.  You can brush your teeth and rinse your mouth, but don t swallow any water.  Day of Surgery  Do not wear makeup. Do not use perfume, deodorant, or hairspray. Remove nail polish and artificial nails.  Leave jewelry (including rings), watches, and other valuables at home.  Be sure to bring health insurance cards or forms and a photo ID.  Bring a list of your medications (include the name, dose, how often you take them, and the time last dose was taken).  Arrive on time at the hospital or surgery facility.

## 2019-08-27 NOTE — PROGRESS NOTES
Preceptor Attestation:   Patient seen, evaluated and discussed with the resident. I have verified the content of the note, which accurately reflects my assessment of the patient and the plan of care.   Supervising Physician:  Ricardo Restrepo MD MD

## 2019-09-16 ENCOUNTER — TELEPHONE (OUTPATIENT)
Dept: FAMILY MEDICINE | Facility: CLINIC | Age: 74
End: 2019-09-16

## 2019-09-16 DIAGNOSIS — E11.65 TYPE 2 DIABETES MELLITUS WITH HYPERGLYCEMIA, WITHOUT LONG-TERM CURRENT USE OF INSULIN (H): Primary | ICD-10-CM

## 2019-09-16 NOTE — TELEPHONE ENCOUNTER
Patient call requesting for Contour Test strips, please advise strip is not listed on patient's chart. Patient would like script to be send to [facility] on [address] and [address].     Odette De La Cruz, CMA

## 2019-09-16 NOTE — TELEPHONE ENCOUNTER
[facility] phone call message- patient requesting a refill:    Full Medication Name: Test Strips      Pharmacy confirmed as     : Yes    Additional Comments: Please call pt once found pharmacy who has Contour test strips.      OK to leave a message on voice mail? Yes    Primary language: English      needed? No    Call taken on September 16, 2019 at 11:48 AM by Grisel Flores-Cardona

## 2019-11-05 ENCOUNTER — HEALTH MAINTENANCE LETTER (OUTPATIENT)
Age: 74
End: 2019-11-05

## 2020-01-15 ENCOUNTER — TELEPHONE (OUTPATIENT)
Dept: FAMILY MEDICINE | Facility: CLINIC | Age: 75
End: 2020-01-15

## 2020-01-15 DIAGNOSIS — E11.29 TYPE 2 DIABETES MELLITUS WITH MICROALBUMINURIA, WITHOUT LONG-TERM CURRENT USE OF INSULIN (H): ICD-10-CM

## 2020-01-15 DIAGNOSIS — R80.9 TYPE 2 DIABETES MELLITUS WITH MICROALBUMINURIA, WITHOUT LONG-TERM CURRENT USE OF INSULIN (H): ICD-10-CM

## 2020-01-15 NOTE — TELEPHONE ENCOUNTER
[facility] phone call message- patient requesting a refill:    Full Medication Name: Glipizide    Dose: 10mg    Pharmacy confirmed as     [facility] [address] [place] [address] [address] [facility]  [address] [facility] [place] [address]  Phone: [phone] Fax: [phone]  : Yes    Additional Comments: he needs a refill.     OK to leave a message on voice mail? Yes    Primary language: English      needed? No    Call taken on January 15, 2020 at 9:56 AM by Dony Chaudhry

## 2020-01-16 RX ORDER — GLIPIZIDE 10 MG/1
TABLET, FILM COATED, EXTENDED RELEASE ORAL
Qty: 180 TABLET | Refills: 0 | Status: SHIPPED | OUTPATIENT
Start: 2020-01-16 | End: 2020-02-03

## 2020-01-16 NOTE — TELEPHONE ENCOUNTER
Patient need to come in for an appointment. I talk to Caron already and she will reach out to patient to schedule an OF visit with Dr. Christy.   Odette De La Cruz, CMA

## 2020-01-16 NOTE — TELEPHONE ENCOUNTER
The pt called back to ask  the Dr to send in a prescription for his medication he has an appointment on 1/27 but needs some to get him threw till then

## 2020-01-27 DIAGNOSIS — E78.5 DYSLIPIDEMIA (HIGH LDL; LOW HDL): ICD-10-CM

## 2020-01-27 DIAGNOSIS — N18.30 CKD (CHRONIC KIDNEY DISEASE) STAGE 3, GFR 30-59 ML/MIN (H): ICD-10-CM

## 2020-01-27 DIAGNOSIS — E11.65 TYPE 2 DIABETES MELLITUS WITH HYPERGLYCEMIA, WITHOUT LONG-TERM CURRENT USE OF INSULIN (H): Primary | ICD-10-CM

## 2020-01-27 DIAGNOSIS — I10 ESSENTIAL HYPERTENSION: ICD-10-CM

## 2020-01-29 DIAGNOSIS — E11.29 TYPE 2 DIABETES MELLITUS WITH MICROALBUMINURIA, WITHOUT LONG-TERM CURRENT USE OF INSULIN (H): ICD-10-CM

## 2020-01-29 DIAGNOSIS — R80.9 TYPE 2 DIABETES MELLITUS WITH MICROALBUMINURIA, WITHOUT LONG-TERM CURRENT USE OF INSULIN (H): ICD-10-CM

## 2020-02-03 ENCOUNTER — OFFICE VISIT (OUTPATIENT)
Dept: FAMILY MEDICINE | Facility: CLINIC | Age: 75
End: 2020-02-03
Payer: MEDICARE

## 2020-02-03 VITALS
HEIGHT: 68 IN | RESPIRATION RATE: 12 BRPM | WEIGHT: 162 LBS | DIASTOLIC BLOOD PRESSURE: 40 MMHG | OXYGEN SATURATION: 98 % | TEMPERATURE: 97.4 F | SYSTOLIC BLOOD PRESSURE: 153 MMHG | HEART RATE: 75 BPM | BODY MASS INDEX: 24.55 KG/M2

## 2020-02-03 DIAGNOSIS — I10 BENIGN ESSENTIAL HYPERTENSION: ICD-10-CM

## 2020-02-03 DIAGNOSIS — L85.3 DRY SKIN: Primary | ICD-10-CM

## 2020-02-03 DIAGNOSIS — E11.29 TYPE 2 DIABETES MELLITUS WITH MICROALBUMINURIA, WITHOUT LONG-TERM CURRENT USE OF INSULIN (H): ICD-10-CM

## 2020-02-03 DIAGNOSIS — E78.5 DYSLIPIDEMIA (HIGH LDL; LOW HDL): ICD-10-CM

## 2020-02-03 DIAGNOSIS — L71.9 ROSACEA: ICD-10-CM

## 2020-02-03 DIAGNOSIS — R80.9 TYPE 2 DIABETES MELLITUS WITH MICROALBUMINURIA, WITHOUT LONG-TERM CURRENT USE OF INSULIN (H): ICD-10-CM

## 2020-02-03 DIAGNOSIS — E11.65 TYPE 2 DIABETES MELLITUS WITH HYPERGLYCEMIA, WITHOUT LONG-TERM CURRENT USE OF INSULIN (H): ICD-10-CM

## 2020-02-03 DIAGNOSIS — N18.30 CKD (CHRONIC KIDNEY DISEASE) STAGE 3, GFR 30-59 ML/MIN (H): ICD-10-CM

## 2020-02-03 LAB
BUN SERPL-MCNC: 21.9 MG/DL (ref 7–21)
CALCIUM SERPL-MCNC: 9.2 MG/DL (ref 8.5–10.1)
CHLORIDE SERPLBLD-SCNC: 101.2 MMOL/L (ref 98–110)
CHOLEST SERPL-MCNC: 256.5 MG/DL (ref 0–200)
CHOLEST/HDLC SERPL: 6 {RATIO} (ref 0–5)
CO2 SERPL-SCNC: 27.3 MMOL/L (ref 20–32)
CREAT SERPL-MCNC: 1.4 MG/DL (ref 0.7–1.3)
CREAT UR-MCNC: 110.6 MG/DL
GFR SERPL CREATININE-BSD FRML MDRD: 53.7 ML/MIN/1.7 M2
GLUCOSE SERPL-MCNC: 152.7 MG'DL (ref 70–99)
HBA1C MFR BLD: 6.8 % (ref 4.1–5.7)
HDLC SERPL-MCNC: 42.9 MG/DL
LDLC SERPL CALC-MCNC: 183 MG/DL (ref 0–129)
MICROALBUMIN UR-MCNC: 58.58 MG/DL (ref 0–1.99)
MICROALBUMIN/CREAT UR: 529.7 MG/G
POTASSIUM SERPL-SCNC: 4.3 MMOL/L (ref 3.2–4.6)
SODIUM SERPL-SCNC: 134.4 MMOL/L (ref 132–142)
TRIGL SERPL-MCNC: 151.1 MG/DL (ref 0–150)
VLDL CHOLESTEROL: 30.2 MG/DL (ref 7–32)

## 2020-02-03 RX ORDER — LISINOPRIL 40 MG/1
40 TABLET ORAL DAILY
Qty: 90 TABLET | Refills: 1 | Status: SHIPPED | OUTPATIENT
Start: 2020-02-03 | End: 2020-09-16

## 2020-02-03 RX ORDER — GLIPIZIDE 10 MG/1
20 TABLET, FILM COATED, EXTENDED RELEASE ORAL DAILY
Qty: 180 TABLET | Refills: 1 | Status: SHIPPED | OUTPATIENT
Start: 2020-02-03 | End: 2020-10-16

## 2020-02-03 RX ORDER — METRONIDAZOLE 7.5 MG/G
GEL TOPICAL 2 TIMES DAILY
Qty: 135 G | Refills: 1 | Status: SHIPPED | OUTPATIENT
Start: 2020-02-03 | End: 2021-01-14

## 2020-02-03 RX ORDER — ATORVASTATIN CALCIUM 40 MG/1
40 TABLET, FILM COATED ORAL DAILY
Qty: 90 TABLET | Refills: 1 | Status: SHIPPED | OUTPATIENT
Start: 2020-02-03 | End: 2021-01-14

## 2020-02-03 RX ORDER — AMMONIUM LACTATE 12 G/100G
LOTION TOPICAL 2 TIMES DAILY
Qty: 225 G | Refills: 3 | Status: SHIPPED | OUTPATIENT
Start: 2020-02-03 | End: 2022-02-18

## 2020-02-03 RX ORDER — BENZOYL PEROXIDE 5 G/100G
GEL TOPICAL DAILY
Qty: 45 G | Refills: 3 | Status: SHIPPED | OUTPATIENT
Start: 2020-02-03 | End: 2023-02-09

## 2020-02-03 ASSESSMENT — MIFFLIN-ST. JEOR: SCORE: 1459.33

## 2020-02-03 NOTE — LETTER
February 4, 2020      Dennis Blum  [address]  [facility] [id number]        Dear Dennis,    I'm glad that you came into clinic yesterday!     Your results are mixed!:   Your average sugar A1c is at goal - good!  Our goal is to have you at least under 8.0 - and you'll see that this is the first time in 2 years you have been there.  Keep up how you're managing diabetes.     Lab Results        Component                Value               Date                        A1C                      6.8                 02/03/2020                  A1C                      8.7                 01/03/2019                  A1C                      7.3                 04/20/2018                  A1C                      7.8                 09/23/2016                  A1C                      11.4                03/08/2016           The bad cholesterol LDL lab is VERY elevated - like you had not taken Atorvastatin for at least 1 day.  If that is NOT the case, please let me know since we will need to increase the dose to the maximum amount of 80mgs daily.  Based on your other risks, it is estimated that you have a 50% chance of having a heart attack in the next 10 years - which is high - taking the atorvastatin every day lowers that risk considerably.  Please let me know if I need to send in a prescription for the higher dose.     The 10-year ASCVD risk score (Lianet GABRIELA Jr., et al., 2013) is: 57.7%     Values used to calculate the score:       Age: 74 years       Sex: Male       Is Non- : No       Diabetic: Yes       Tobacco smoker: No       Systolic Blood Pressure: 153 mmHg       Is BP treated: Yes       HDL Cholesterol: 42.9 mg/dL       Total Cholesterol: 256.5 mg/dL     Lastly, your kidney function is a little borderline - we will check this again next time we see you - ideally in 3 months.  You continue to lose some protein in your urine which is common in diabetics.  Taking Lisinopril helps as  well as keeping your sugars controlled.       OK?  Don't be a stranger!  We ideally like to see all diabetics every 3 months.  Also, since I only get 20 minutes to see you if there are other concerns to address, you need to schedule a separate visit to discuss - OK?  Take care!  Please see below for your test results.    Resulted Orders   Lipid Panel ([facility])   Result Value Ref Range    Cholesterol 256.5 (H) 0.0 - 200.0 mg/dL    Cholesterol/HDL Ratio 6.0 (H) 0.0 - 5.0    HDL Cholesterol 42.9 >40.0 mg/dL    LDL Cholesterol Calculated 183 (H) 0 - 129 mg/dL    Triglycerides 151.1 (H) 0.0 - 150.0 mg/dL    VLDL Cholesterol 30.2 7.0 - 32.0 mg/dL   Microalbumin Random Ur ([facility])   Result Value Ref Range    Microalbumin, Urine 58.58 (H) 0.00 - 1.99 mg/dL    Creatinine, Urine 110.6 mg/dL    Albumin Urine mg/g Cr 529.7 (H) <=19.9 mg/g    Narrative    Test performed by:  [facility]  [address] [place] [address]  Microalbumin, Random Urine  <2.0 mg/dL . . . . . . . . Normal  3.0-30.0 mg/dL . . . . . . Microalbuminuria  >30.0 mg/dL . . . . . .  . Clinical Proteinuria  Microalbumin/Creatinine Ratio, Random Urine  <20 mg/g . . . . .. . . . Normal   mg/g . . . . . . . Microalbuminuria  >300 mg/g . . . . . . . . Clinical Proteinuria   Basic Metabolic Panel ([facility])   Result Value Ref Range    Urea Nitrogen 21.9 (H) 7.0 - 21.0 mg/dL    Calcium 9.2 8.5 - 10.1 mg/dL    Chloride 101.2 98.0 - 110.0 mmol/L    Carbon Dioxide 27.3 20.0 - 32.0 mmol/L    Creatinine 1.4 (H) 0.7 - 1.3 mg/dL    Glucose 152.7 (H) 70.0 - 99.0 mg'dL    Potassium 4.3 3.2 - 4.6 mmol/L    Sodium 134.4 132.0 - 142.0 mmol/L    GFR Estimate 53.7 (L) >60.0 mL/min/1.7 m2    GFR Estimate If Black 65.0 >60.0 mL/min/1.7 m2   Hemoglobin A1c ([facility])   Result Value Ref Range    Hemoglobin A1C 6.8 (H) 4.1 - 5.7 %   If you have any questions, please call the clinic to make an appointment.    Sincerely,    Pavan Christy MD

## 2020-02-03 NOTE — PROGRESS NOTES
[facility] Medicine Clinic Note    Patient: Dennis Blum  : 1945  MRN: [id number]         SUBJECTIVE       Dennis Blum is a 74 year old male with a PMH significant for:  Patient Active Problem List   Diagnosis     Hypertension     Diabetes mellitus, type 2 (H)     CKD (chronic kidney disease) stage 3, GFR 30-59 ml/min (H)     Dyslipidemia (high LDL; low HDL)     Systolic murmur     He presents to clinic today primarily for a follow up of his diabetes, hypertension, and dyslipidemia.     He also has a complaint of rash on his face, and sometimes on the left side of his trunk. This has been going on for about a week. He gets similar episodes every few weeks or so, they last for a few days to 2 weeks, and then subside. Describes the rash as red, mostly on the left face, and some itchiness without redness on his left trunk near his hip. 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 However he has not seen any bugs in bed and no one else that live there has complaints like his. Itching is managed with topical ointment and is not impairing his abilities of daily of living or work.     Past Medical History, Past Surgical History, Medications, Allergies, and Family History were reviewed and updated as needed.        REVIEW OF SYSTEMS     ROS is otherwise negative except noted above.  General, he feels well. He is checking sugars regularly and they are in the  range. He stays active everyday and eats a diet high in fruits and vegetables and low in meat, and does not consume any dairy. Since last visit, he had eye surgery for removal of basal cell cancer and of a lesion from behind his ear.  No notes available from either ophtho or derm.  He does wonder whether his eyelids are now asymmetric post-op.  He has scheduled a Follow-up appt with eye MD.        OBJECTIVE     Vitals:    20 0807   BP: (!) 153/40  "  BP Location: Left arm   Patient Position: Sitting   Cuff Size: Adult Regular   Pulse: 75   Resp: 12   Temp: 97.4  F (36.3  C)   TempSrc: Oral   SpO2: 98%   Weight: 73.5 kg (162 lb)   Height: 1.727 m (5' 8\")     Body mass index is 24.63 kg/m .    Physical Exam:  GENERAL: Awake, alert. Well-nourished, well-developed. No acute distress. Appears comfortable.  CARDIO:RRR. Systolic murmur heard best in RUSB. Previously documented. No other abnormalities appreciated.  Chest: CTAB, no wheezing or coughing  Integumentary: Non-scaling rosacea on left face. Open comedone near left eye. No scarring. Nothing noted on left flank area.    LABORATORY:  Results for orders placed or performed in visit on 02/03/20 (from the past 24 hour(s))   Hemoglobin A1c ([facility])   Result Value Ref Range    Hemoglobin A1C 6.8 (H) 4.1 - 5.7 %   Lipid Panel ([facility])   Result Value Ref Range    Cholesterol 256.5 (H) 0.0 - 200.0 mg/dL    Cholesterol/HDL Ratio 6.0 (H) 0.0 - 5.0    HDL Cholesterol 42.9 >40.0 mg/dL    LDL Cholesterol Calculated 183 (H) 0 - 129 mg/dL    Triglycerides 151.1 (H) 0.0 - 150.0 mg/dL    VLDL Cholesterol 30.2 7.0 - 32.0 mg/dL   Basic Metabolic Panel ([facility])   Result Value Ref Range    Urea Nitrogen 21.9 (H) 7.0 - 21.0 mg/dL    Calcium 9.2 8.5 - 10.1 mg/dL    Chloride 101.2 98.0 - 110.0 mmol/L    Carbon Dioxide 27.3 20.0 - 32.0 mmol/L    Creatinine 1.4 (H) 0.7 - 1.3 mg/dL    Glucose 152.7 (H) 70.0 - 99.0 mg'dL    Potassium 4.3 3.2 - 4.6 mmol/L    Sodium 134.4 132.0 - 142.0 mmol/L    GFR Estimate 53.7 (L) >60.0 mL/min/1.7 m2    GFR Estimate If Black 65.0 >60.0 mL/min/1.7 m2         ASSESSMENT AND PLAN     1. Dyslipidemia (high LDL; low HDL)  Dennis endorses compliance with all of his medications. He reports no side effects with his medications. We discussed the importance of taking all of his medications as prescribed, especially considering his very elevated risk of CVD. We also discussed how important it is to check up " regularly in clinic (more than once a year) given his lipid levels.  After the visit, his labs came back. It is concerning that his LDL is so high given that he is currently taking a high intensity statin. This makes it concerning that he is not taking the statin as prescribed. We will follow up by mail - and if he is adherent, will increase the statin dose.  He did agree that he would schedule a f/u appointment in 3-6 months to recheck his labs again and this was encouraged.  The 10-year ASCVD risk score (Gillett GABRIELA Jr., et al., 2013) is: 57.7%    Values used to calculate the score:      Age: 74 years      Sex: Male      Is Non- : No      Diabetic: Yes      Tobacco smoker: No      Systolic Blood Pressure: 153 mmHg      Is BP treated: Yes      HDL Cholesterol: 42.9 mg/dL      Total Cholesterol: 256.5 mg/dL    - Lipid Panel ([facility])    2. CKD (chronic kidney disease) stage 3, GFR 30-59 ml/min (H)  Urine microalbumin was elevated last check in January 2019 (191.77). His albumin urine mg/g Cr was also elevated at 1453.9 at that time as well. We will re-check these labs today. I'm concerned that given his continual decline in kidney function (GFR 53.7 from 57.8) that this is likely worse today. His creatinine is also mildly elevated at 1.4 from 1.3. Recommend BS control and recheck in 3-6 months.  - Microalbumin Random Ur ([facility])  - Basic Metabolic Panel ([facility])    3. Type 2 diabetes mellitus with hyperglycemia, without long-term current use of insulin (H)  Fortuntatly, his A1c is down significantly from 8.7 to 6.8. This is a great improvement and is now in our target range.  Continue current DM treatments and diet.  - Hemoglobin A1c ([facility])    - atorvastatin (LIPITOR) 40 MG tablet; Take 1 tablet (40 mg) by mouth daily  Dispense: 90 tablet; Refill: 1  - metFORMIN (GLUCOPHAGE) 500 MG tablet; Take 2 tablets (1,000 mg) by mouth 2 times daily (with meals)  Dispense: 360 tablet; Refill: 1  -  glipiZIDE (GLUCOTROL XL) 10 MG 24 hr tablet; Take 2 tablets (20 mg) by mouth daily  Dispense: 180 tablet; Refill: 1    4. Benign essential hypertension  His BP is elevated today.   - lisinopril (PRINIVIL/ZESTRIL) 40 MG tablet; Take 1 tablet (40 mg) by mouth daily  Dispense: 90 tablet; Refill: 1    5. Rosacea, Dry skin, Comedomal acne  This is causing some itchiness, however he had been using some metronidazole gel with improvement in his symptoms. We will reorder the metronidazole gel also give him the below medications for symptoms relief.  Recommend benzoyl peroxide for open comedomes.  Has an appt with Derm - follow up with them at that time.  - metroNIDAZOLE (METROGEL) 0.75 % external gel; Apply topically 2 times daily  Dispense: 135 g; Refill: 1  - benzoyl peroxide 5 % topical gel; Apply topically daily  Dispense: 45 g; Refill: 3  - ammonium lactate (LAC-HYDRIN) 12 % external lotion; Apply topically 2 times daily  Dispense: 225 g; Refill: 3    6. S/p BCC excision  Is following with both ophtho and derm - agree with that plan.    Return to clinic in 3-6 months for follow-up of labs and for diabetes check or sooner if develops new or worsening symptoms.    Isidoro Chu, MS-3 on behalf of Dr Christy    Preceptor Attestation:  I was present with the medical student who participated in the service and in the documentation of this note. I have verified the history and personally performed the physical exam and medical decision making. I have verified the content of the note, which accurately reflects my assessment of the patient and the plan of care.   Supervising Physician:  Pavan Christy MD.     Total visit time was 25 mins, all of which was face to face MD time, and over 50% of this time was spent in counseling and coordination of care.

## 2020-02-04 NOTE — RESULT ENCOUNTER NOTE
Checo Collins, I'm glad that you came into clinic yesterday!    Your results are mixed!:   Your average sugar A1c is at goal - good!  Our goal is to have you at least under 8.0 - and you'll see that this is the first time in 2 years you have been there.  Keep up how you're managing diabetes.    Lab Results       Component                Value               Date                       A1C                      6.8                 02/03/2020                 A1C                      8.7                 01/03/2019                 A1C                      7.3                 04/20/2018                 A1C                      7.8                 09/23/2016                 A1C                      11.4                03/08/2016          The bad cholesterol LDL lab is VERY elevated - like you had not taken Atorvastatin for at least 1 day.  If that is NOT the case, please let me know since we will need to increase the dose to the maximum amount of 80mgs daily.  Based on your other risks, it is estimated that you have a 50% chance of having a heart attack in the next 10 years - which is high - taking the atorvastatin every day lowers that risk considerably.  Please let me know if I need to send in a prescription for the higher dose.    The 10-year ASCVD risk score ([place] GABRIELA Jr., et al., 2013) is: 57.7%    Values used to calculate the score:      Age: 74 years      Sex: Male      Is Non- : No      Diabetic: Yes      Tobacco smoker: No      Systolic Blood Pressure: 153 mmHg      Is BP treated: Yes      HDL Cholesterol: 42.9 mg/dL      Total Cholesterol: 256.5 mg/dL    Lastly, your kidney function is a little borderline - we will check this again next time we see you - ideally in 3 months.  You continue to lose some protein in your urine which is common in diabetics.  Taking Lisinopril helps as well as keeping your sugars controlled.      OK?  Don't be a stranger!  We ideally like to see all diabetics every 3  months.  Also, since I only get 20 minutes to see you if there are other concerns to address, you need to schedule a separate visit to discuss - OK?  Take care!  Pavan Christy

## 2020-02-16 ENCOUNTER — HEALTH MAINTENANCE LETTER (OUTPATIENT)
Age: 75
End: 2020-02-16

## 2020-03-09 ENCOUNTER — TRANSFERRED RECORDS (OUTPATIENT)
Dept: HEALTH INFORMATION MANAGEMENT | Facility: CLINIC | Age: 75
End: 2020-03-09

## 2020-09-02 DIAGNOSIS — E11.29 TYPE 2 DIABETES MELLITUS WITH MICROALBUMINURIA, WITHOUT LONG-TERM CURRENT USE OF INSULIN (H): ICD-10-CM

## 2020-09-02 DIAGNOSIS — R80.9 TYPE 2 DIABETES MELLITUS WITH MICROALBUMINURIA, WITHOUT LONG-TERM CURRENT USE OF INSULIN (H): ICD-10-CM

## 2020-09-03 NOTE — TELEPHONE ENCOUNTER
Pt's calling to check on status of prescription. Please return call when filled. He does not have any left for his evening meds.

## 2020-09-03 NOTE — TELEPHONE ENCOUNTER
Per Dr. Christy. Pt needs to schedule a clinic f/u.     Attempted to call and left message for pt to call clinic back. Rabia Gonzalez A

## 2020-09-16 DIAGNOSIS — I10 BENIGN ESSENTIAL HYPERTENSION: ICD-10-CM

## 2020-09-16 RX ORDER — LISINOPRIL 40 MG/1
TABLET ORAL
Qty: 30 TABLET | Refills: 0 | Status: SHIPPED | OUTPATIENT
Start: 2020-09-16 | End: 2021-01-14

## 2020-10-15 DIAGNOSIS — R80.9 TYPE 2 DIABETES MELLITUS WITH MICROALBUMINURIA, WITHOUT LONG-TERM CURRENT USE OF INSULIN (H): ICD-10-CM

## 2020-10-15 DIAGNOSIS — E11.29 TYPE 2 DIABETES MELLITUS WITH MICROALBUMINURIA, WITHOUT LONG-TERM CURRENT USE OF INSULIN (H): ICD-10-CM

## 2020-10-16 RX ORDER — GLIPIZIDE 10 MG/1
TABLET, FILM COATED, EXTENDED RELEASE ORAL
Qty: 60 TABLET | Refills: 0 | Status: SHIPPED | OUTPATIENT
Start: 2020-10-16 | End: 2020-11-18

## 2020-11-18 DIAGNOSIS — R80.9 TYPE 2 DIABETES MELLITUS WITH MICROALBUMINURIA, WITHOUT LONG-TERM CURRENT USE OF INSULIN (H): ICD-10-CM

## 2020-11-18 DIAGNOSIS — E11.29 TYPE 2 DIABETES MELLITUS WITH MICROALBUMINURIA, WITHOUT LONG-TERM CURRENT USE OF INSULIN (H): ICD-10-CM

## 2020-11-18 RX ORDER — GLIPIZIDE 10 MG/1
TABLET, FILM COATED, EXTENDED RELEASE ORAL
Qty: 60 TABLET | Refills: 0 | Status: SHIPPED | OUTPATIENT
Start: 2020-11-18 | End: 2020-12-21

## 2020-11-22 ENCOUNTER — HEALTH MAINTENANCE LETTER (OUTPATIENT)
Age: 75
End: 2020-11-22

## 2020-12-05 DIAGNOSIS — R80.9 TYPE 2 DIABETES MELLITUS WITH MICROALBUMINURIA, WITHOUT LONG-TERM CURRENT USE OF INSULIN (H): ICD-10-CM

## 2020-12-05 DIAGNOSIS — E11.29 TYPE 2 DIABETES MELLITUS WITH MICROALBUMINURIA, WITHOUT LONG-TERM CURRENT USE OF INSULIN (H): ICD-10-CM

## 2020-12-18 DIAGNOSIS — R80.9 TYPE 2 DIABETES MELLITUS WITH MICROALBUMINURIA, WITHOUT LONG-TERM CURRENT USE OF INSULIN (H): ICD-10-CM

## 2020-12-18 DIAGNOSIS — E11.29 TYPE 2 DIABETES MELLITUS WITH MICROALBUMINURIA, WITHOUT LONG-TERM CURRENT USE OF INSULIN (H): ICD-10-CM

## 2020-12-21 RX ORDER — GLIPIZIDE 10 MG/1
TABLET, FILM COATED, EXTENDED RELEASE ORAL
Qty: 60 TABLET | Refills: 0 | Status: SHIPPED | OUTPATIENT
Start: 2020-12-21 | End: 2021-01-11

## 2021-01-09 DIAGNOSIS — R80.9 TYPE 2 DIABETES MELLITUS WITH MICROALBUMINURIA, WITHOUT LONG-TERM CURRENT USE OF INSULIN (H): ICD-10-CM

## 2021-01-09 DIAGNOSIS — E11.29 TYPE 2 DIABETES MELLITUS WITH MICROALBUMINURIA, WITHOUT LONG-TERM CURRENT USE OF INSULIN (H): ICD-10-CM

## 2021-01-11 RX ORDER — GLIPIZIDE 10 MG/1
TABLET, FILM COATED, EXTENDED RELEASE ORAL
Qty: 60 TABLET | Refills: 0 | Status: SHIPPED | OUTPATIENT
Start: 2021-01-11 | End: 2021-01-14

## 2021-01-14 ENCOUNTER — VIRTUAL VISIT (OUTPATIENT)
Dept: FAMILY MEDICINE | Facility: CLINIC | Age: 76
End: 2021-01-14
Payer: MEDICARE

## 2021-01-14 DIAGNOSIS — Z00.00 PREVENTATIVE HEALTH CARE: ICD-10-CM

## 2021-01-14 DIAGNOSIS — L71.9 ROSACEA: ICD-10-CM

## 2021-01-14 DIAGNOSIS — N18.31 STAGE 3A CHRONIC KIDNEY DISEASE (H): ICD-10-CM

## 2021-01-14 DIAGNOSIS — H57.9 ITCHY EYES: ICD-10-CM

## 2021-01-14 DIAGNOSIS — I10 BENIGN ESSENTIAL HYPERTENSION: ICD-10-CM

## 2021-01-14 DIAGNOSIS — E11.29 TYPE 2 DIABETES MELLITUS WITH MICROALBUMINURIA, WITHOUT LONG-TERM CURRENT USE OF INSULIN (H): Primary | ICD-10-CM

## 2021-01-14 DIAGNOSIS — R80.9 TYPE 2 DIABETES MELLITUS WITH MICROALBUMINURIA, WITHOUT LONG-TERM CURRENT USE OF INSULIN (H): Primary | ICD-10-CM

## 2021-01-14 PROCEDURE — 99442 PR PHYSICIAN TELEPHONE EVALUATION 11-20 MIN: CPT | Mod: 95 | Performed by: FAMILY MEDICINE

## 2021-01-14 RX ORDER — OLOPATADINE HYDROCHLORIDE 1 MG/ML
1 SOLUTION/ DROPS OPHTHALMIC 2 TIMES DAILY
Qty: 5 ML | Refills: 3 | Status: SHIPPED | OUTPATIENT
Start: 2021-01-14 | End: 2022-02-18

## 2021-01-14 RX ORDER — METRONIDAZOLE 7.5 MG/G
GEL TOPICAL 2 TIMES DAILY
Qty: 135 G | Refills: 1 | Status: SHIPPED | OUTPATIENT
Start: 2021-01-14 | End: 2022-02-18

## 2021-01-14 RX ORDER — GLIPIZIDE 10 MG/1
TABLET, FILM COATED, EXTENDED RELEASE ORAL
Qty: 180 TABLET | Refills: 1 | Status: SHIPPED | OUTPATIENT
Start: 2021-01-14 | End: 2021-09-10

## 2021-01-14 RX ORDER — LISINOPRIL 40 MG/1
40 TABLET ORAL DAILY
Qty: 90 TABLET | Refills: 1 | Status: SHIPPED | OUTPATIENT
Start: 2021-01-14 | End: 2021-07-23

## 2021-01-14 RX ORDER — ATORVASTATIN CALCIUM 40 MG/1
40 TABLET, FILM COATED ORAL DAILY
Qty: 90 TABLET | Refills: 1 | Status: SHIPPED | OUTPATIENT
Start: 2021-01-14 | End: 2021-07-23

## 2021-01-14 NOTE — PATIENT INSTRUCTIONS
Dennis, nice to talk with you today!  I have refilled all your medications including atorvastatin and aspirin which are very helpful to reduce your risk of a future heart attack.  Please come in once you are back on all your medications in a few weeks time to check labs and I will let you know those results.    Generally would recommend a visit every 3 to 6 months to stay on top of your chronic health conditions.  Congratulations for all your healthy behaviors!  Keep up the good work!  Happy new year, Pavan santo

## 2021-01-14 NOTE — PROGRESS NOTES
Dennis is a 75 year old who is being evaluated via a billable telephone visit.      What phone number would you like to be contacted at? [phone]  How would you like to obtain your AVS? Mail a copy  Assessment & Plan     Dennis was seen today for diabetes and medication reconciliation.    Diagnoses and all orders for this visit:    Type 2 diabetes mellitus with microalbuminuria, without long-term current use of insulin (H)  -     atorvastatin (LIPITOR) 40 MG tablet; Take 1 tablet (40 mg) by mouth daily  -     glipiZIDE (GLUCOTROL XL) 10 MG 24 hr tablet; TAKE 2 TABLETS BY MOUTH ONCE DAILY  -     metFORMIN (GLUCOPHAGE) 500 MG tablet; Take 2 tablets (1,000 mg) by mouth 2 times daily (with meals)  -     Hemoglobin A1c ([facility]); Future  -     Lipid Panel ([facility]); Future    Stage 3a chronic kidney disease  -     Microalbumin Random Ur ([facility]); Future  -     Basic Metabolic Panel ([facility]); Future    Itchy eyes  -     olopatadine (PATANOL) 0.1 % ophthalmic solution; Place 1 drop into both eyes 2 times daily    Preventative health care  -     aspirin (ASA) 81 MG EC tablet; Take 1 tablet (81 mg) by mouth daily    Benign essential hypertension  -     lisinopril (ZESTRIL) 40 MG tablet; Take 1 tablet (40 mg) by mouth daily    Rosacea  -     metroNIDAZOLE (METROGEL) 0.75 % external gel; Apply topically 2 times daily    Other orders  -     blood glucose (NO BRAND SPECIFIED) lancets standard; Use to test blood sugar 2 times daily or as directed.  -     blood glucose (NO BRAND SPECIFIED) test strip; Use to test blood sugar 3 times daily or as directed.      Dennis is out of several medications and so I have therefore refilled them.  Based on his last lipid panel his ASCVD risk is over 50% and I therefore strongly recommended that he take atorvastatin which he seems to have thought had been previously put on hold.  He does take a daily aspirin but is also out of this and we agreed to continue that.    Based on his history  he may be having allergic conjunctivitis and I am comfortable giving him an antiallergy eyedrop with a plan to follow-up if symptoms do not resolve.    Especially once he has been back on his meds a few weeks I have encouraged him to have labs drawn again and he agrees to do so in about 3 weeks time.  I will follow up with those results.  I have encouraged him to be seen about every 3 to 6 months given his chronic diseases.  I have congratulated him for his continued healthy behaviors.  I recommended that he obtain a flu shot when he does come in for labs.    Return in about 2 weeks (around 1/28/2021), or if symptoms worsen or fail to improve, for LABS.    Pavan Christy MD  [facility] KEYLA Collins is a 75 year old who presents to clinic today for the following health issues     HPI   This is a 75-year-old who is well-known to me.  He has deliberately avoided coming to clinic in an effort to socially isolate during the COVID-19 pandemic.  Unfortunately however he has run out of several of his medications and needs these refilled.  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  He has not had COVID-19 infection.  He confirms that he does not drink nor smoke and he attempts to exercise regularly.  He either takes long walks down [facility] or goes to the gym.    He saw an eye doctor 2 months ago and was told that all was well.  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    ROS:  Feet: He says he has no problems with his feet, no numbness no tingling no sores  Eyes: He does report however that he gets frequent runny eyes.  Sometimes these are itchy.  He questions whether he could have some allergic conjunctivitis.         Objective         Vitals:  No vitals were obtained today due to virtual visit.    Physical Exam   healthy, alert and no distress  PSYCH: Alert and oriented times 3; coherent speech, normal   rate and volume, able to articulate logical thoughts, able   to abstract reason, no tangential thoughts, no hallucinations   or delusions  His affect is pleasant  RESP: No cough, no audible wheezing, able to talk in full sentences  Remainder of exam unable to be completed due to telephone visits    Office Visit on 02/03/2020   Component Date Value Ref Range Status     Cholesterol 02/03/2020 256.5* 0.0 - 200.0 mg/dL Final     Cholesterol/HDL Ratio 02/03/2020 6.0* 0.0 - 5.0 Final     HDL Cholesterol 02/03/2020 42.9  >40.0 mg/dL Final     LDL Cholesterol Calculated 02/03/2020 183* 0 - 129 mg/dL Final     Triglycerides 02/03/2020 151.1* 0.0 - 150.0 mg/dL Final     VLDL Cholesterol 02/03/2020 30.2  7.0 - 32.0 mg/dL Final     Microalbumin, Urine 02/03/2020 58.58* 0.00 - 1.99 mg/dL Final     Creatinine, Urine 02/03/2020 110.6  mg/dL Final     Albumin Urine mg/g Cr 02/03/2020 529.7* <=19.9 mg/g Final     Urea Nitrogen 02/03/2020 21.9* 7.0 - 21.0 mg/dL Final     Calcium 02/03/2020 9.2  8.5 - 10.1 mg/dL Final     Chloride 02/03/2020 101.2  98.0 - 110.0 mmol/L Final     Carbon Dioxide 02/03/2020 27.3  20.0 - 32.0 mmol/L Final     Creatinine 02/03/2020 1.4* 0.7 - 1.3 mg/dL Final     Glucose 02/03/2020 152.7* 70.0 - 99.0 mg'dL Final     Potassium 02/03/2020 4.3  3.2 - 4.6 mmol/L Final     Sodium 02/03/2020 134.4  132.0 - 142.0 mmol/L Final     GFR Estimate 02/03/2020 53.7* >60.0 mL/min/1.7 m2 Final     GFR Estimate If Black 02/03/2020 65.0  >60.0 mL/min/1.7 m2 Final     Hemoglobin A1C 02/03/2020 6.8* 4.1 - 5.7 % Final     The 10-year ASCVD risk score (Lianet OCHOA Jr., et al., 2013) is: 59.9%    Values used to calculate the score:      Age: 75 years      Sex: Male      Is Non- : No      Diabetic: Yes      Tobacco smoker:  No      Systolic Blood Pressure: 153 mmHg      Is BP treated: Yes      HDL Cholesterol: 42.9 mg/dL      Total Cholesterol: 256.5 mg/dL          Phone call duration: 17 minutes

## 2021-03-12 ENCOUNTER — TELEPHONE (OUTPATIENT)
Dept: FAMILY MEDICINE | Facility: CLINIC | Age: 76
End: 2021-03-12

## 2021-03-12 NOTE — TELEPHONE ENCOUNTER
Patient Quality Outreach      Summary:    Patient has the following on his problem list/HM:     Diabetes    Last A1C:  Lab Results   Component Value Date    A1C 6.8 02/03/2020    A1C 8.7 01/03/2019       Last LDL:    Lab Results   Component Value Date     02/03/2020       Is the patient on a Statin? Yes          Is the patient on Aspirin? Yes    Medications     HMG CoA Reductase Inhibitors     atorvastatin (LIPITOR) 40 MG tablet       Salicylates     aspirin (ASA) 81 MG EC tablet             Last three blood pressure readings:  BP Readings from Last 3 Encounters:   02/03/20 (!) 153/40   08/27/19 (!) 146/84   07/30/19 (!) 176/92            Tobacco Use      Smoking status: Never Smoker      Smokeless tobacco: Never Used          Patient is due/failing the following:   A1C    Type of outreach:    Phone, left message for patient/parent to call back.    Questions for provider review:    None                                                                                                                                     Odette De La Cruz, CMA

## 2021-03-23 ENCOUNTER — TELEPHONE (OUTPATIENT)
Dept: FAMILY MEDICINE | Facility: CLINIC | Age: 76
End: 2021-03-23

## 2021-03-23 NOTE — TELEPHONE ENCOUNTER
Patient Quality Outreach 2nd Attempt      Summary:    Type of outreach:    Phone, left message for patient/parent to call back.    Next Steps:  Reach out within 90 days via Letter.    Max number of attempts reached: No. Will try again in 90 days if patient still on fail list.    Questions for provider review:    None                                                                                                                    Odette De La Cruz CMA

## 2021-03-26 ENCOUNTER — TELEPHONE (OUTPATIENT)
Dept: FAMILY MEDICINE | Facility: CLINIC | Age: 76
End: 2021-03-26

## 2021-03-26 DIAGNOSIS — N18.31 CKD STAGE G3A/A3, GFR 45-59 AND ALBUMIN CREATININE RATIO >300 MG/G (H): Primary | ICD-10-CM

## 2021-03-26 DIAGNOSIS — R80.9 TYPE 2 DIABETES MELLITUS WITH MICROALBUMINURIA, WITHOUT LONG-TERM CURRENT USE OF INSULIN (H): ICD-10-CM

## 2021-03-26 DIAGNOSIS — N18.31 STAGE 3A CHRONIC KIDNEY DISEASE (H): ICD-10-CM

## 2021-03-26 DIAGNOSIS — E11.29 TYPE 2 DIABETES MELLITUS WITH MICROALBUMINURIA, WITHOUT LONG-TERM CURRENT USE OF INSULIN (H): ICD-10-CM

## 2021-03-26 LAB
BUN SERPL-MCNC: 19.4 MG/DL (ref 7–21)
CALCIUM SERPL-MCNC: 8.8 MG/DL (ref 8.5–10.1)
CHLORIDE SERPLBLD-SCNC: 104.3 MMOL/L (ref 98–110)
CHOLEST SERPL-MCNC: 169.9 MG/DL (ref 0–200)
CHOLEST/HDLC SERPL: 3.8 {RATIO} (ref 0–5)
CO2 SERPL-SCNC: 21.5 MMOL/L (ref 20–32)
CREAT SERPL-MCNC: 1.3 MG/DL (ref 0.7–1.3)
CREAT UR-MCNC: 81.9 MG/DL
GFR SERPL CREATININE-BSD FRML MDRD: 56.1 ML/MIN/1.7 M2
GLUCOSE SERPL-MCNC: 123.3 MG'DL (ref 70–99)
HBA1C MFR BLD: 6.8 % (ref 4.1–5.7)
HDLC SERPL-MCNC: 44.2 MG/DL
LDLC SERPL CALC-MCNC: 110 MG/DL (ref 0–129)
MICROALBUMIN UR-MCNC: 49.49 MG/DL (ref 0–1.99)
MICROALBUMIN/CREAT UR: 604.3 MG/G
POTASSIUM SERPL-SCNC: 4.7 MMOL/L (ref 3.2–4.6)
SODIUM SERPL-SCNC: 138.3 MMOL/L (ref 132–142)
TRIGL SERPL-MCNC: 80.3 MG/DL (ref 0–150)
VLDL CHOLESTEROL: 16.1 MG/DL (ref 7–32)

## 2021-03-26 PROCEDURE — 36415 COLL VENOUS BLD VENIPUNCTURE: CPT

## 2021-03-26 PROCEDURE — 83036 HEMOGLOBIN GLYCOSYLATED A1C: CPT

## 2021-03-26 PROCEDURE — 80048 BASIC METABOLIC PNL TOTAL CA: CPT

## 2021-03-26 PROCEDURE — 80061 LIPID PANEL: CPT

## 2021-03-26 NOTE — TELEPHONE ENCOUNTER
LMTCC for pt to se if he would like to come in to the clinic on Saturday, 4/3/21 to receive the Keron & Keron COVID 19 vaccine./NG

## 2021-03-26 NOTE — LETTER
April 2, 2021      Dennis Yatesarez  [address]  [place] [address]        Dear Mr.Salas Blum,    We are writing to inform you of your test results.    Overall your average sugar A1c is at goal of under 7.0 - good!  Your kidney function remains in the same level of [id number] - we need to continue monitoring that.  I would recommend we get an ultrasound of your kidneys to see how they look - OK?  We do these on Mondays at our clinic - I'll ask someone to call you to see if we can schedule that.  Your bad cholesterol LDL is almost half of what it was before so continue taking the atorvastatin.  Take care!    Resulted Orders   Basic Metabolic Panel ([facility])   Result Value Ref Range    Urea Nitrogen 19.4 7.0 - 21.0 mg/dL    Calcium 8.8 8.5 - 10.1 mg/dL    Chloride 104.3 98.0 - 110.0 mmol/L    Carbon Dioxide 21.5 20.0 - 32.0 mmol/L    Creatinine 1.3 (H) 0.7 - 1.3 mg/dL    Glucose 123.3 (H) 70.0 - 99.0 mg'dL    Potassium 4.7 (H) 3.2 - 4.6 mmol/L    Sodium 138.3 132.0 - 142.0 mmol/L    GFR Estimate 56.1 (L) >60.0 mL/min/1.7 m2      Comment:      eGFR is calculated by the CKD-EPI creatinine equation, without race   adjustment. eGFR can be influenced by muscle mass, exercise, and diet. The   reported eGFR is an estimation only and is only applicable if the renal   function is stable.     Lipid Panel ([facility])   Result Value Ref Range    Cholesterol 169.9 0.0 - 200.0 mg/dL    Cholesterol/HDL Ratio 3.8 0.0 - 5.0    HDL Cholesterol 44.2 >40.0 mg/dL    LDL Cholesterol Calculated 110 0 - 129 mg/dL    Triglycerides 80.3 0.0 - 150.0 mg/dL    VLDL Cholesterol 16.1 7.0 - 32.0 mg/dL   Hemoglobin A1c ([facility])   Result Value Ref Range    Hemoglobin A1C 6.8 (H) 4.1 - 5.7 %   Microalbumin Random Ur ([facility])   Result Value Ref Range    Microalbumin, Urine 49.49 (H) 0.00 - 1.99 mg/dL    Creatinine, Urine 81.9 mg/dL    Albumin Urine mg/g Cr 604.3 (H) <=19.9 mg/g    Narrative    [facility]:  [facility]  [address] [place] [address]  Microalbumin, Random Urine  <2.0 mg/dL . . . . . . . . Normal  3.0-30.0 mg/dL . . . . . . Microalbuminuria  >30.0 mg/dL . . . . . .  . Clinical Proteinuria  Microalbumin/Creatinine Ratio, Random Urine  <20 mg/g . . . . .. . . . Normal   mg/g . . . . . . . Microalbuminuria  >300 mg/g . . . . . . . . Clinical Proteinuria       If you have any questions or concerns, please call the clinic at the number listed above.       Sincerely,      Pavan Christy MD

## 2021-03-29 NOTE — RESULT ENCOUNTER NOTE
Amelie Collins.  Overall your average sugar A1c is at goal of under 7.0 - good!  Your kidney function remains in the same level of [id number] - we need to continue monitoring that.  I would recommend we get an ultrasound of your kidneys to see how they look - OK?  We do these on Mondays at our clinic - I'll ask someone to call you to see if we can schedule that.  Your bad cholesterol LDL is almost half of what it was before so continue taking the atorvastatin.  Take care!  Dr Pavan Christy

## 2021-04-03 ENCOUNTER — IMMUNIZATION (OUTPATIENT)
Dept: FAMILY MEDICINE | Facility: CLINIC | Age: 76
End: 2021-04-03
Payer: MEDICARE

## 2021-04-03 PROCEDURE — 91303 PR COVID VAC JANSSEN AD26 0.5ML: CPT

## 2021-04-03 PROCEDURE — 0031A PR COVID VAC JANSSEN AD26 0.5ML: CPT

## 2021-04-04 ENCOUNTER — HEALTH MAINTENANCE LETTER (OUTPATIENT)
Age: 76
End: 2021-04-04

## 2021-05-04 ENCOUNTER — OFFICE VISIT (OUTPATIENT)
Dept: FAMILY MEDICINE | Facility: CLINIC | Age: 76
End: 2021-05-04
Payer: MEDICARE

## 2021-05-04 ENCOUNTER — TELEPHONE (OUTPATIENT)
Dept: FAMILY MEDICINE | Facility: CLINIC | Age: 76
End: 2021-05-04

## 2021-05-04 VITALS
SYSTOLIC BLOOD PRESSURE: 146 MMHG | HEART RATE: 105 BPM | OXYGEN SATURATION: 96 % | RESPIRATION RATE: 16 BRPM | TEMPERATURE: 98.3 F | DIASTOLIC BLOOD PRESSURE: 75 MMHG

## 2021-05-04 DIAGNOSIS — R05.9 COUGH: Primary | ICD-10-CM

## 2021-05-04 PROCEDURE — 99213 OFFICE O/P EST LOW 20 MIN: CPT | Mod: GC | Performed by: STUDENT IN AN ORGANIZED HEALTH CARE EDUCATION/TRAINING PROGRAM

## 2021-05-04 RX ORDER — GUAIFENESIN/DEXTROMETHORPHAN 100-10MG/5
5 SYRUP ORAL EVERY 4 HOURS PRN
Qty: 473 ML | Refills: 1 | Status: SHIPPED | OUTPATIENT
Start: 2021-05-04 | End: 2021-05-06

## 2021-05-04 NOTE — LETTER
To Whom It Concerns,    Dennis Blum was seen at the [facility] on 5/4/21. He received the Keron&Keron COVID vaccine on 4/3/21 and reports negative COVID test done last week.    Sincerely,              Alejanrdo Garvin MD

## 2021-05-04 NOTE — TELEPHONE ENCOUNTER
[facility] phone call message- general phone call:    Reason for call: pt would like letter stating he can be off  work until Friday 5/7/2021    Action desired: please call pt when ready    Return call needed: Yes    OK to leave a message on voice mail? Yes    Advised patient to response may take up to 2 business days: No    Primary language: English      needed? No    Call taken on May 4, 2021 at 11:36 AM by Caron Cabrales

## 2021-05-04 NOTE — PATIENT INSTRUCTIONS
Patient Education     Understanding COVID-19 (Coronavirus Disease 2019)  COVID-19 is an illness of the lungs. It causes fever, coughing and trouble breathing. The illness is caused by a new virus in the coronavirus family called SARS-CoV-2.  First seen in late 2019, this virus has spread to many cities and countries around the world. It seems to spread and infect people fairly easily. Scientists are working to understand it better.  For the latest information, visit the CDC website at www.cdc.gov/coronavirus/2019-ncov.html Or call 963-NGX-GOBM (565-545-3427).   How does COVID-19 spread?  The virus may spread by:    Breathing in droplets of fluid that someone has coughed or sneezed into the air.    Touching your eyes, nose or mouth after touching an infected surface. (The virus can live on handles, objects and other surfaces.)  What are the symptoms of COVID-19?  Symptoms may appear 2 to 14 days after contact with the virus. They can include:    Fever    Dry cough    Trouble breathing    Other flu-like symptoms  Many people have no symptoms or only mild symptoms.  In some cases, this virus can cause infection (pneumonia) in both lungs. This can make a person very ill, and it can even cause death.  Am I at risk?  You are at risk for getting COVID-19 if:    You live in (or recently traveled to) an area with a COVID-19 outbreak    You had contact with a sick person who recently traveled to an area with an outbreak    You had contact with someone who has or may have COVID-19  Your chances of severe illness are higher if:    You are an older adult    You have heart or lung disease    You have diabetes or another serious health issue  How is COVID-19 diagnosed?  Your care team will ask about your symptoms, recent travel and contact with sick people.     Not everyone will be tested for the virus. If you need to be tested, we will use a cotton swab to take a sample of mucus from your nose or throat. Or, we may collect  "mucus that you have coughed up from your lungs (sputum).  How is COVID-19 treated?  At this time, there is no medicine to treat COVID-19. If you get sick, there are things you can do to help your body fight the virus. These may include:    Medicine (acetaminophen) to help ease pain and reduce fever.    Bed rest to help your body fight the illness.  If you are very sick, you may need to stay in the hospital. We may give you fluids through a vein to keep you hydrated (IV fluids). To make sure your body gets enough oxygen, we may give you an oxygen mask or a breathing machine (ventilator).  How can I protect myself and others from COVID-19?  Be prepared. Try to keep a 2-week supply of medicines, food and other household items. Plan who will care for you, your children and pets if you get sick. And, stay informed about COVID-19 in your area.  There is no vaccine yet for COVID-19. To protect yourself and others:    Wash your hands often. Use soap and clean, running water for at least 20 seconds.    If you can t use soap and water, use hand . Make sure it has at least 60% alcohol.    Don't touch your eyes, nose or mouth unless you have clean hands.    Try to avoid \"high-touch\" public surfaces, like doorknobs. Don't shake hands.    Clean home and work surfaces often with disinfectant.    Cough or sneeze into a tissue, then throw the tissue into the trash. (If you don't have tissues, cough or sneeze into the bend of your elbow.) Wash your hands after coughing or sneezing.    Don t share food or household items, like eating and drinking utensils.    Stay about 6 feet away from others as much you can. This is called  social distancing.     Stay away from people who are sick.    Try to avoid areas that have a COVID-19 outbreak.  For the latest travel alerts, visit the CDC website at: www.cdc.gov/coronavirus/2019-ncov/travelers   If you ve been in an area with COVID-19 in the last 14 days:    You may need to stay home " or limit your activities for up to 14 days. Call your care team for instructions.    Check for fever. Take your temperature every morning and evening for at least 14 days. Keep a record of the readings.    Stay home if you are sick for any reason.    You do not need to wear a face mask unless you are sick.    Watch for symptoms of the virus.  If you have COVID-19 (or symptoms of COVID-19):    Stay home and contact your care team. We will tell you what to do.    Don t leave home unless you need to get medical care. Don't go to work, school or public areas. Don't use buses, taxis or other public transportation.    Wash your hands often.    Stay away from other people in your home. Limit visitors. No kissing. No sharing household items.    Clean any surfaces you touch with disinfectant.    Cough or sneeze into a tissue, throw the tissue in the trash, then wash your hands. If you don't have tissues, cough or sneeze into the bend of your elbow.    Wear a face mask to protect others from your germs. If you can t wear a mask, your caregivers should wear one.    If you need to go to the hospital or clinic, call ahead to let them know. Expect the care team to wear masks, gowns, gloves and eye protection. You may be put in a separate room.    Follow all instructions from your care team.    Don t panic. Keep in mind that other illnesses can cause similar symptoms.  If you are caring for a sick person:    Follow all instructions from the care team.    Wash your hands often.    Wear protective clothing as advised.    Make sure the sick person wears a face mask. If they can't wear a mask, don't stay in the same room with the person. If you must be in the same room, wear a face mask.    Keep track of the sick person s symptoms.    Clean surfaces, fabrics and laundry well and often.    Keep other people and pets away from the sick person.  Should I worry about my pets?  At this time, there are no reports of pets getting sick with  COVID-19 or spreading the virus that causes it. Until we know more, be sure to:    Wash your hands after touching any animals.    Don't touch animals that may be sick.    Keep pets away from sick people.    Limit your contact with pets and animals if you are sick.  When to contact your care team    Before coming to the hospital or clinic    If you have symptoms of COVID-19 and have recently been in an area with an outbreak    If you have COVID-19 and your symptoms are worse   This information has been modified by your health care provider with permission from the publisher.  For informational purposes only. Not to replace the advice of your health care provider. Copyright   2020 [employer]. All rights reserved.

## 2021-05-04 NOTE — PROGRESS NOTES
Preceptor Attestation:    I discussed the patient with the resident and evaluated the patient in person. I have verified the content of the note, which accurately reflects my assessment of the patient and the plan of care.   Supervising Physician:  Scot Canada MD.

## 2021-05-04 NOTE — PROGRESS NOTES
ASSESSMENT AND PLAN     1. Cough  Patient presents with 2 weeks of cough. Feels like he has sputum in his chest that he cannot expectorate. S/p J&J COVID vaccine 1 month ago. Reports negative COVID test last week, I cannot see evidence of this. I recommended repeat COVID testing today, the patient declined. I recommended he continue to avoid working in person while he is symptomatic. Will try Tussin for symptoms. If he develops fever or malaise, will return for repeat COVID testing and possible PNA workup. Patient requested letter confirming he has been vaccinated, which I provided.  - COVID-19 Virus PCR MRF ([facility])      I ended our visit today by discussing the patient's diagnoses and recommended treatment. Please refer to today's diagnoses and orders for further details. I briefly discussed the pathophysiology of these conditions and outlined their expected course. I discussed the warning symptoms and signs that indicate an atypical course that would need urgent or emergent care. I also discussed self care strategies for symptom relief. Patient voiced understanding of plan of care and was in full agreement to proceed as discussed.    RTC if develops new or worsening symptoms.         SUBJECTIVE       Dennis Blum is a 75 year old  male with a PMH significant for:     Patient Active Problem List   Diagnosis     Benign essential hypertension     Type 2 diabetes mellitus with microalbuminuria, without long-term current use of insulin (H)     CKD stage G3a/A3, GFR 45-59 and albumin creatinine ratio >300 mg/g     Dyslipidemia (high LDL; low HDL)     Systolic murmur     Rosacea       He presents with cough.    Patient reports two weeks of persistent cough. He received his single-dose COVID vaccine on 4/3/21. Following that he felt well. Then, two weeks ago he developed cough that feels like it is coming from deep in his chest. He feels like there is dry phlegm that he cannot get up. After the cough  persisted for a week, the patient's son took him in for a COVID test. He had this done 7 days ago, he thinks it was at [facility], but he cannot remember. I do not see record of this in the EMR, but he says he had a negative result. Cough has persisted. Nyquil is helpful, standing in a steaming shower is helpful. He is trying to stay well hydrated. No fevers or chills, no chest pain or SOB. No nausea or diarrhea, no headache. Patient works one-on-one with clients for the [employer] and has been avoiding going to work because he does not want to make people feel uncomfortable.     PMH, Medications and Allergies were reviewed and updated as needed.        REVIEW OF SYSTEMS     CONSTITUTIONAL: NEGATIVE for fever, chills, malaise  INTEGUMENTARY/SKIN: NEGATIVE for worrisome rashes, moles or lesions  EYES: NEGATIVE for vision changes or irritation  ENT/MOUTH: NEGATIVE for ear, mouth and throat problems  RESP: Cough. NEGATIVE for significant SOB  CV: NEGATIVE for chest pain, palpitations or peripheral edema  GI: NEGATIVE for nausea, abdominal pain, heartburn, or change in bowel habits  MUSCULOSKELETAL: NEGATIVE for significant arthralgias or myalgia  NEURO: NEGATIVE for weakness, dizziness or paresthesias  PSYCHIATRIC: NEGATIVE for changes in mood or affect        OBJECTIVE     Vitals:    05/04/21 0915 05/04/21 0916   BP: (!) 146/73 (!) 146/75   BP Location: Left arm Left arm   Patient Position: Sitting Sitting   Cuff Size: Adult Regular Adult Regular   Pulse: 105 105   Resp: 16    Temp: 98.3  F (36.8  C)    TempSrc: Oral    SpO2: 96%      There is no height or weight on file to calculate BMI.    Constitutional: Awake, alert, cooperative, no apparent distress, and appears stated age.  Eyes: Lids and lashes normal, pupils equal, round and reactive to light, extra ocular muscles intact, sclera clear, conjunctiva normal.  ENT: Normocephalic, without obvious abnormality, atramatic, sinuses nontender on palpation  Neck:  Supple, symmetrical, trachea midline, no adenopathy, thyroid symmetric, not enlarged and no tenderness, skin normal.  Hematologic / Lymphatic: No cervical lymphadenopathy and no supraclavicular lymphadenopathy.  Lungs: Dry cough intermittently during exam. No increased work of breathing, good air exchange, clear to auscultation bilaterally, no crackles or wheezing.  Cardiovascular: Regular rate and rhythm, normal S1 and S2, no murmur  Abdomen: soft, non-distended, non-tender  Neurologic: Awake, alert, oriented to name, place and time.  Cranial nerves II-XII are grossly intact.   Neuropsychiatric: Normal affect, mood, orientation, memory and insight.  Skin: No rashes, erythema, pallor, petechia or purpura.      Patient staffed with Dr. Nancie Garvin MD

## 2021-05-06 ENCOUNTER — TELEPHONE (OUTPATIENT)
Dept: FAMILY MEDICINE | Facility: CLINIC | Age: 76
End: 2021-05-06

## 2021-05-06 ENCOUNTER — OFFICE VISIT (OUTPATIENT)
Dept: FAMILY MEDICINE | Facility: CLINIC | Age: 76
End: 2021-05-06
Payer: MEDICARE

## 2021-05-06 VITALS
WEIGHT: 149.8 LBS | SYSTOLIC BLOOD PRESSURE: 150 MMHG | RESPIRATION RATE: 24 BRPM | BODY MASS INDEX: 22.78 KG/M2 | HEART RATE: 83 BPM | DIASTOLIC BLOOD PRESSURE: 77 MMHG | OXYGEN SATURATION: 97 % | TEMPERATURE: 98.3 F

## 2021-05-06 DIAGNOSIS — R05.9 COUGH: Primary | ICD-10-CM

## 2021-05-06 DIAGNOSIS — R01.1 SYSTOLIC MURMUR: ICD-10-CM

## 2021-05-06 LAB
LABORATORY COMMENT REPORT: ABNORMAL
SARS-COV-2 RNA RESP QL NAA+PROBE: NORMAL
SARS-COV-2 RNA RESP QL NAA+PROBE: POSITIVE
SPECIMEN SOURCE: ABNORMAL
SPECIMEN SOURCE: NORMAL

## 2021-05-06 PROCEDURE — 99214 OFFICE O/P EST MOD 30 MIN: CPT | Mod: CS | Performed by: FAMILY MEDICINE

## 2021-05-06 RX ORDER — CODEINE PHOSPHATE AND GUAIFENESIN 10; 100 MG/5ML; MG/5ML
1-2 SOLUTION ORAL EVERY 4 HOURS PRN
Qty: 180 ML | Refills: 0 | Status: SHIPPED | OUTPATIENT
Start: 2021-05-06 | End: 2021-05-06

## 2021-05-06 RX ORDER — CODEINE PHOSPHATE AND GUAIFENESIN 10; 100 MG/5ML; MG/5ML
SOLUTION ORAL
Qty: 0.1 ML | Refills: 0 | Status: SHIPPED | OUTPATIENT
Start: 2021-05-06 | End: 2021-06-07

## 2021-05-06 NOTE — PATIENT INSTRUCTIONS
Go to [employer]    Login = [email or web address]  Password = [email or web address]    05/07/21   [facility]  ([facility])  Phone:[phone]  Fax:[phone]    Referral, demographics, office note and med list faxed to [phone]. They will contact patient to schedule.    Nichol Varner

## 2021-05-06 NOTE — TELEPHONE ENCOUNTER
12.25pm I called patient and left a VM that he should  prescription from [facility] pharmacy in [place] which I had verified was the pharmacy he wanted to use.  I do not want to send a duplicate of a controlled substance to another pharmacy.  Pavan Christy

## 2021-05-06 NOTE — LETTER
RETURN TO WORK/SCHOOL FORM    5/6/2021    Re: Dennis Blum  1945      To Whom It May Concern:     Dennis Blum was seen in clinic today and has been unable to work since 5/4/21 and remains unable to work at this time.  If he is feeling better, he may return to work without restrictions on 5/10/21.          Restrictions:  None      Pavan Christy MD  5/6/2021 9:58 AM

## 2021-05-06 NOTE — TELEPHONE ENCOUNTER
[facility] phone call message- general phone call:    Reason for call: The pt called to let the Dr know the pharmacy did not have the medication and needs it sent to [facility] [place] and would like a call back whe it is done      Return call needed: Yes    OK to leave a message on voice mail? Yes    Primary language: English      needed? No    Call taken on May 6, 2021 at 11:31 AM by Pavan Jorgensen

## 2021-05-06 NOTE — PROGRESS NOTES
Dennis was seen today for recheck medication and patient request for note/letter.    Diagnoses and all orders for this visit:    Cough  -     COVID-19 Virus PCR MRF ([facility])  -     Discontinue: guaiFENesin-codeine (ROBITUSSIN AC) 100-10 MG/5ML solution; Take 5-10 mLs by mouth every 4 hours as needed for cough  -     Discontinue: guaiFENesin-codeine (ROBITUSSIN AC) 100-10 MG/5ML solution; Take 5-10 mLs by mouth every 4 hours as needed for cough  -     guaiFENesin-codeine (ROBITUSSIN AC) 100-10 MG/5ML solution; Cancel prescription - sent elsewhere    Systolic murmur  -     Echocardiogram Complete; Future      I see that he declined to have a Covid test 2 days ago.  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    I also agreed to give him a cough suppressant containing codeine for as needed use.  I also excused him from work for the next 4 days pending his Covid result.    Addendum: Patient called back indicating that the cough medicine was not available at his usual pharmacy with no prediction of when it would become available and so he asked me to send it to a different pharmacy and I canceled the original prescription.    If his Covid result is negative and he continues to expectorate sputum, I will plan to order a course of azithromycin.  We agreed not to obtain a chest x-ray today given the absence of physical findings and his low risk for chronic pulmonary disease.    We will reschedule his renal ultrasound and I have placed another order for an ECHO to evaluate his systolic murmur.    Total visit time with patient was 25 mins, all of which was face to face MD time, and over 50% of this time was spent in counseling and coordination of care.  Including post-encounter documentation and orders, total encounter time was 32 mins.      Subjective:  This is a 75-year-old who is well-known to me.  He follows up having been seen in clinic 2 days  ago for a persistent cough.  He was given a cough remedy that he feels is making things worse rather than better.  He is also concerned that he should not be going to work since he is an [employer] worker who works around people with illnesses and does not want to make them sick.  His history is that about 10 days ago he ate at a restaurant and soon afterwards felt generally unwell.  Then over a week ago he developed a cough which is productive of a thick sputum that he feels unable to fully expectorate.  He has not had a fever.  He does not appreciate any shortness of breath nor wheezing.  He denies sore throat, nasal congestion, ear pain.  He is a never smoker and has never had to use an inhaler.    ROS:  Renal: We discussed the fact that his most recent lab had indicated chronic kidney disease with decreased GFR and microalbuminuria.  He is already taking lisinopril but I had recommended that we image his kidneys.  He had to cancel the previously scheduled ultrasound but is agreeable to reschedule this.  Endocrine: He reports that his diabetes is going very well    Objective:  BP (!) 150/77 (BP Location: Left arm, Patient Position: Sitting, Cuff Size: Adult Regular)   Pulse 83   Temp 98.3  F (36.8  C) (Oral)   Resp 24   Wt 67.9 kg (149 lb 12.8 oz)   SpO2 97%   BMI 22.78 kg/m    His systolic BP is mildly elevated, O2 sats are good on room air, he is afebrile.  He feels that he has lost some weight although review reveals he is actually gained some weight in recent years  His TMs are healthy, he has mild nasal congestion and no pharyngitis  He has no neck adenopathy nor goiter  He has a 2/6 to 3/6 systolic murmur heard best at the left sternal border and in the aortic area.  This has never been evaluated although I had referred him for an ECHO 2 years ago  His lungs are clear to auscultation without any added sounds  He has no lower extremity edema

## 2021-05-07 ENCOUNTER — TELEPHONE (OUTPATIENT)
Dept: FAMILY MEDICINE | Facility: CLINIC | Age: 76
End: 2021-05-07

## 2021-05-07 NOTE — TELEPHONE ENCOUNTER
Coronavirus (COVID-19) Notification    Reason for call  Notify of POSITIVE  COVID-19 lab result, assess symptoms,  review [facility] recommendations    Lab Result   Lab test for 2019-nCoV rRt-PCR or SARS-COV-2 PCR  Oropharyngeal AND/OR nasopharyngeal swabs were POSITIVE for 2019-nCoV RNA [OR] SARS-COV-2 RNA (COVID-19) RNA     We have been unable to reach Patient by phone at this time to notify of their Positive COVID-19 result.  Left voicemail message requesting a call back to [phone] [facility] for results.        POSITIVE COVID-19 Letter sent.    Bonny Perales LPN

## 2021-05-10 ENCOUNTER — RECORDS - HEALTHEAST (OUTPATIENT)
Dept: ADMINISTRATIVE | Facility: OTHER | Age: 76
End: 2021-05-10

## 2021-05-26 ENCOUNTER — RECORDS - HEALTHEAST (OUTPATIENT)
Dept: ADMINISTRATIVE | Facility: CLINIC | Age: 76
End: 2021-05-26

## 2021-05-27 ENCOUNTER — VIRTUAL VISIT (OUTPATIENT)
Dept: FAMILY MEDICINE | Facility: CLINIC | Age: 76
End: 2021-05-27
Payer: MEDICARE

## 2021-05-27 DIAGNOSIS — R35.0 URINARY FREQUENCY: Primary | ICD-10-CM

## 2021-05-27 DIAGNOSIS — U07.1 COVID-19 VIRUS DETECTED: ICD-10-CM

## 2021-05-27 DIAGNOSIS — K59.04 CHRONIC IDIOPATHIC CONSTIPATION: ICD-10-CM

## 2021-05-27 PROCEDURE — 99442 PR PHYSICIAN TELEPHONE EVALUATION 11-20 MIN: CPT | Mod: 95 | Performed by: FAMILY MEDICINE

## 2021-05-27 RX ORDER — CALCIUM POLYCARBOPHIL 625 MG 625 MG/1
1 TABLET ORAL DAILY
Qty: 90 TABLET | Refills: 1 | Status: SHIPPED | OUTPATIENT
Start: 2021-05-27 | End: 2022-02-18

## 2021-05-27 RX ORDER — POLYETHYLENE GLYCOL 3350 17 G/17G
17 POWDER, FOR SOLUTION ORAL DAILY PRN
Qty: 507 G | Refills: 3 | Status: SHIPPED | OUTPATIENT
Start: 2021-05-27 | End: 2022-02-18

## 2021-05-27 NOTE — PROGRESS NOTES
Dennis is a 75 year old who is being evaluated via a billable telephone visit.      What phone number would you like to be contacted at? [phone]  How would you like to obtain your AVS? Mail a copy    Dennis was seen today for uti and follow up.    Diagnoses and all orders for this visit:    Urinary frequency    Chronic idiopathic constipation  -     polyethylene glycol (MIRALAX) 17 GM/Dose powder; Take 17 g by mouth daily as needed for constipation  -     calcium polycarbophil (FIBERCON) 625 MG tablet; Take 1 tablet (625 mg) by mouth daily    COVID-19 virus detected    I surveyed him with the AUA symptom score and his score = 3  AUA ([employer]) SYMPTOM SCORE  1. Over the past month, how often have you had a sensation of not emptying your bladder completely after you finished urinating?: Not at all  2. Over the past month, how often have you had to urinate again less than two hours after you finished urinating?: Not at all  2. Over the past month, how often have you had to urinate again less than two hours after you finished urinating?: Not at all  3. Over the past month, how often have you found you stopped and started again several times when you urinated?: Less than 1 time in 5  4. Over the past month, how often have you found it difficult to postpone urination?: Not at all  5. Over the past month, how often have you had a weak urine stream?: Less than 1 time in 5  6. Over the past month, how often have you had to push or strain to begin urinating?: Not at all  7. Over the past month, how many times did you typically get up to urinate from the time you went to bed at night until the time you got up in the morning?: 1 time  The Disease-specific Quality of Life Question: If you were to spend the rest of your life with your urinary condition just the way it is now, how would you feel about that? (highlight or underline): Mixed  AUA Score: 3    I indicated that I am uncomfortable treating  him with antibiotics for a presumptive UTI over the telephone without checking a urinalysis.  He understands and agrees and is willing to schedule an appointment for labs for tomorrow morning at which time he can give a urine sample and we will obtain a PSA given the possibility that his symptoms are prostate related.    I have also sent in a prescription for daily fiber supplement to prevent constipation in the future and a prescription for MiraLAX to treat his current symptoms.  He expresses agreement and understanding with this plan.    Addendum:  Labs 5/28/21  Urine dipstick shows positive for glucose, protein.  BS = 303  Discussed with patient who reports that he stopped taking all his meds when he was sick with COVID-19 infection.  Is now back on all meds.  He will continue to track his BS.  Almost certainly urinary symptoms are secondary to glycosuria and not UTI.        Shelli Collins is a 75 year old who presents for the following health issues     HPI   This is a 75-year-old who is well-known to me who scheduled today's encounter to discuss several weeks of urinary frequency and other urinary symptoms.  He reports that he had a cough and was quite unwell for a few weeks with a documented COVID-19 infection.  Toward the end of that infection he started to develop noticeable urinary frequency.  He denies significant burning with urination but does notice that he may need to go again soon after urinating and has the impression that he is not fully emptying his bladder.  He has diabetes and reports that this continues to be well controlled.  He does not have any penile discharge.  He does have some nocturia.    Upon further questioning he acknowledges that he is also had some constipation and has wondered if this has worsened his urinary symptoms; he has not been trying anything and is open to taking some laxatives.    Review of Systems   Pulmonary: He reports that he is 95% recovered from the Covid  infection and has returned to work.      Objective           Vitals:  No vitals were obtained today due to virtual visit.    Physical Exam   alert and no distress  PSYCH: Alert and oriented times 3; coherent speech, normal   rate and volume, able to articulate logical thoughts, able   to abstract reason, no tangential thoughts, no hallucinations   or delusions  His affect is normal and pleasant  RESP: No cough, no audible wheezing, able to talk in full sentences  Remainder of exam unable to be completed due to telephone visits            Phone call duration: 16 minutes

## 2021-05-28 DIAGNOSIS — R35.0 URINARY FREQUENCY: ICD-10-CM

## 2021-05-28 LAB
BACTERIA: NORMAL
BILIRUBIN UR: NEGATIVE MG/DL
BLOOD UR: ABNORMAL MG/DL
BUN SERPL-MCNC: 23 MG/DL (ref 7–21)
CALCIUM SERPL-MCNC: 8.7 MG/DL (ref 8.5–10.1)
CASTS: NORMAL /LPF
CHLORIDE SERPLBLD-SCNC: 98.9 MMOL/L (ref 98–110)
CO2 SERPL-SCNC: 25.5 MMOL/L (ref 20–32)
CREAT SERPL-MCNC: 1.3 MG/DL (ref 0.7–1.3)
CRYSTAL URINE: NORMAL /LPF
EPITHELIAL CELLS UR: <2 /LPF (ref 0–2)
GFR SERPL CREATININE-BSD FRML MDRD: 58.4 ML/MIN/1.7 M2
GLUCOSE SERPL-MCNC: 303.8 MG'DL (ref 70–99)
GLUCOSE URINE: ABNORMAL
KETONES UR QL: NEGATIVE MG/DL
LEUKOCYTE ESTERASE UR: NEGATIVE
MUCOUS URINE: NORMAL LPF
NITRITE UR QL STRIP: NEGATIVE MG/DL
PH UR STRIP: 6 [PH] (ref 4.5–8)
POTASSIUM SERPL-SCNC: 4.8 MMOL/L (ref 3.2–4.6)
PROTEIN UR: ABNORMAL MG/DL
PSA SERPL-MCNC: 3.5 NG/ML (ref 0–6.5)
RBC URINE: <2 /HPF
SODIUM SERPL-SCNC: 133 MMOL/L (ref 132–142)
SP GR UR STRIP: 1.01 (ref 1–1.03)
UROBILINOGEN UR STRIP-ACNC: ABNORMAL E.U./DL
WBC URINE: NORMAL /HPF

## 2021-05-28 PROCEDURE — 80048 BASIC METABOLIC PNL TOTAL CA: CPT

## 2021-05-28 PROCEDURE — 36415 COLL VENOUS BLD VENIPUNCTURE: CPT

## 2021-05-28 PROCEDURE — 81001 URINALYSIS AUTO W/SCOPE: CPT

## 2021-05-28 NOTE — LETTER
May 28, 2021      Moris Clark Kulwant  [address] [place] [address]        Dear Mr.Salas Blum,    We are writing to inform you of your test results.    As we discussed on phone, it does not look like you have a urinary infection.  Rather your blood sugar is much higher than usual - over 300.  This very likely is causing you to have frequent urination.  As we discussed, keep track of your blood sugars, take your usual medications and we should expect the urination to return to normal as your blood sugars come down.  OK?  I will let you know when the remainder of your results return in a few days.  Take care!    Resulted Orders   Basic Metabolic Panel ([facility])   Result Value Ref Range    Urea Nitrogen 23.0 (H) 7.0 - 21.0 mg/dL    Calcium 8.7 8.5 - 10.1 mg/dL    Chloride 98.9 98.0 - 110.0 mmol/L    Carbon Dioxide 25.5 20.0 - 32.0 mmol/L    Creatinine 1.3 0.7 - 1.3 mg/dL    Glucose 303.8 (H) 70.0 - 99.0 mg'dL    Potassium 4.8 (H) 3.2 - 4.6 mmol/L    Sodium 133.0 132.0 - 142.0 mmol/L    GFR Estimate 58.4 (L) >60.0 mL/min/1.7 m2      Comment:      eGFR is calculated by the CKD-EPI creatinine equation, without race   adjustment. eGFR can be influenced by muscle mass, exercise, and diet. The   reported eGFR is an estimation only and is only applicable if the renal   function is stable.     Urinalysis, Micro If ([facility])   Result Value Ref Range    Specific Gravity Urine 1.015 1.005 - 1.030    pH Urine 6.0 4.5 - 8.0    Leukocyte Esterase UR Negative NEGATIVE    Nitrite Urine Negative NEGATIVE mg/dL    Protein UR 2+ (A) NEGATIVE mg/dL    Glucose Urine 1+ (A) NEGATIVE    Ketones Urine Negative NEGATIVE mg/dL    Urobilinogen mg/dL 0.2 E.U./dL 0.2 E.U./dL E.U./dL    Bilirubin UR Negative NEGATIVE mg/dL    Blood UR Trace-lysed (A) NEGATIVE mg/dL   Urine Microscopic ([facility])   Result Value Ref Range    WBC Urine None <5 /hpf    RBC Urine <2 <5 /hpf    Epithelial Cells UR <2 0 - 2 /lpf    Mucous Urine None NONE lpf     Casts Urine None NONE /lpf    Crystal Urine None NONE /lpf    Bacteria Wet Prep Few None       If you have any questions or concerns, please call the clinic at the number listed above.       Sincerely,      Pavan Christy MD

## 2021-05-28 NOTE — LETTER
June 1, 2021      Dennis Blum  [address] [place] [address]        Dear Mr.Salas Blum,    We are writing to inform you of your test results.    Amelie Collins - in follow up, no sign of any infection nor of a prostate problem - therefore, as we discussed, it is most likely that your elevated blood sugar is what has caused the urinary frequency.  Please let me know if this does not adequately respond to treatment - Take care!    Resulted Orders   Basic Metabolic Panel ([facility])   Result Value Ref Range    Urea Nitrogen 23.0 (H) 7.0 - 21.0 mg/dL    Calcium 8.7 8.5 - 10.1 mg/dL    Chloride 98.9 98.0 - 110.0 mmol/L    Carbon Dioxide 25.5 20.0 - 32.0 mmol/L    Creatinine 1.3 0.7 - 1.3 mg/dL    Glucose 303.8 (H) 70.0 - 99.0 mg'dL    Potassium 4.8 (H) 3.2 - 4.6 mmol/L    Sodium 133.0 132.0 - 142.0 mmol/L    GFR Estimate 58.4 (L) >60.0 mL/min/1.7 m2      Comment:      eGFR is calculated by the CKD-EPI creatinine equation, without race   adjustment. eGFR can be influenced by muscle mass, exercise, and diet. The   reported eGFR is an estimation only and is only applicable if the renal   function is stable.     PSA Diagnostic ([employer])   Result Value Ref Range    PSA 3.5 0.0 - 6.5 ng/mL    Narrative    Test performed by:  [facility] [address] [place] [address]  Method is Abbott Prostate-Specific Antigen (PSA)  Standard-WHO 1st International (90:10)   Neisseria Gonorrhoeae by PCR ([employer])   Result Value Ref Range    Spec Description Urine     Neisseria Gonorroheae PCR Negative NEG      Comment:      Negative for N. gonorrhoeae rRNA by transcription mediated amplification.  A negative result by transcription mediated amplification does not preclude   the  presence of N. gonorrhoeae infection because results are dependent on proper  and adequate collection, absence of inhibitors, and sufficient rRNA to be  detected.      Narrative    Test performed  by:  [facility]  [address], [address], [place] [address]   Urine Culture ([employer])   Result Value Ref Range    Culture SEE RESULTS BELOW       Comment:      CULTURE, URINE   SOURCE: Urine, Clean Catch   CULTURE RESULTS:    No Growth      Narrative    Test performed by:  [facility] [address] [place] [address]   Chlamydia Trachomatis by PCR ([employer])   Result Value Ref Range    Specimen Description Urine     CHLAMYDIA TRACHOMATIS PCR Negative NEG      Comment:      Negative for C. trachomatis rRNA by transcription mediated amplification.  A negative result by transcription mediated amplification does not preclude   the  presence of C. trachomatis infection because results are dependent on proper  and adequate collection, absence of inhibitors, and sufficient rRNA to be  detected.      Narrative    Test performed by:  [facility]  [address], [address], [place] [address]   Urinalysis, Micro If ([facility])   Result Value Ref Range    Specific Gravity Urine 1.015 1.005 - 1.030    pH Urine 6.0 4.5 - 8.0    Leukocyte Esterase UR Negative NEGATIVE    Nitrite Urine Negative NEGATIVE mg/dL    Protein UR 2+ (A) NEGATIVE mg/dL    Glucose Urine 1+ (A) NEGATIVE    Ketones Urine Negative NEGATIVE mg/dL    Urobilinogen mg/dL 0.2 E.U./dL 0.2 E.U./dL E.U./dL    Bilirubin UR Negative NEGATIVE mg/dL    Blood UR Trace-lysed (A) NEGATIVE mg/dL   Urine Microscopic ([facility])   Result Value Ref Range    WBC Urine None <5 /hpf    RBC Urine <2 <5 /hpf    Epithelial Cells UR <2 0 - 2 /lpf    Mucous Urine None NONE lpf    Casts Urine None NONE /lpf    Crystal Urine None NONE /lpf    Bacteria Wet Prep Few None       If you have any questions or concerns, please call the clinic at the number listed above.       Sincerely,      Pavan Christy MD

## 2021-05-28 NOTE — RESULT ENCOUNTER NOTE
As we discussed on phone, it does not look like you have a urinary infection.  Rather your blood sugar is much higher than usual - over 300.  This very likely is causing you to have frequent urination.  As we discussed, keep track of your blood sugars, take your usual medications and we should expect the urination to return to normal as your blood sugars come down.  OK?  I will let you know when the remainder of your results return in a few days.  Take care, Dr Pavan Christy

## 2021-05-29 LAB
C TRACH DNA SPEC QL NAA+PROBE: NEGATIVE
CULTURE: NORMAL
N GONORRHOEA DNA SPEC QL NAA+PROBE: NEGATIVE
SPECIMEN SOURCE: NORMAL
SPECIMEN SOURCE: NORMAL

## 2021-06-07 ENCOUNTER — OFFICE VISIT (OUTPATIENT)
Dept: FAMILY MEDICINE | Facility: CLINIC | Age: 76
End: 2021-06-07
Payer: MEDICARE

## 2021-06-07 ENCOUNTER — RECORDS - HEALTHEAST (OUTPATIENT)
Dept: ADMINISTRATIVE | Facility: OTHER | Age: 76
End: 2021-06-07

## 2021-06-07 VITALS
SYSTOLIC BLOOD PRESSURE: 145 MMHG | WEIGHT: 146.2 LBS | BODY MASS INDEX: 22.23 KG/M2 | HEART RATE: 79 BPM | RESPIRATION RATE: 20 BRPM | DIASTOLIC BLOOD PRESSURE: 81 MMHG | TEMPERATURE: 98.2 F | OXYGEN SATURATION: 97 %

## 2021-06-07 DIAGNOSIS — R01.1 SYSTOLIC MURMUR: ICD-10-CM

## 2021-06-07 DIAGNOSIS — M51.16 LUMBAR DISC DISEASE WITH RADICULOPATHY: Primary | ICD-10-CM

## 2021-06-07 DIAGNOSIS — K59.00 CONSTIPATION, UNSPECIFIED CONSTIPATION TYPE: ICD-10-CM

## 2021-06-07 DIAGNOSIS — N18.31 CKD STAGE G3A/A3, GFR 45-59 AND ALBUMIN CREATININE RATIO >300 MG/G (H): ICD-10-CM

## 2021-06-07 DIAGNOSIS — R35.0 URINARY FREQUENCY: ICD-10-CM

## 2021-06-07 DIAGNOSIS — E11.29 TYPE 2 DIABETES MELLITUS WITH MICROALBUMINURIA, WITHOUT LONG-TERM CURRENT USE OF INSULIN (H): ICD-10-CM

## 2021-06-07 DIAGNOSIS — R80.9 TYPE 2 DIABETES MELLITUS WITH MICROALBUMINURIA, WITHOUT LONG-TERM CURRENT USE OF INSULIN (H): ICD-10-CM

## 2021-06-07 PROCEDURE — 99214 OFFICE O/P EST MOD 30 MIN: CPT | Performed by: FAMILY MEDICINE

## 2021-06-07 NOTE — PATIENT INSTRUCTIONS
21   ORDER: MRI L-SPINE   [facility]   [phone]  Fax Orders to [phone]     Order faxed to [phone], they will contact patient to schedule.     Nichol Varner

## 2021-06-07 NOTE — PROGRESS NOTES
Dennis was seen today for follow up and lumbar/si.    Diagnoses and all orders for this visit:    Lumbar disc disease with radiculopathy  -     MRI LUMBAR SPINE W/O CONTRAST; Future    Type 2 diabetes mellitus with microalbuminuria, without long-term current use of insulin (H)    Systolic murmur    CKD stage G3a/A3, GFR 45-59 and albumin creatinine ratio >300 mg/g    Urinary frequency    Constipation, unspecified constipation type      Given his longstanding history of LBP and the concern that his GI and  symptoms could possibly relate to exacerbations of low back pain, I suggested that a definitive evaluation would be to obtain an MRI of his lumbar spine.  He is agreeable to this.    His symptoms otherwise however could easily relate to his 3-week illness with COVID-19 infection when he was not eating properly which could have caused constipation and then urinary symptoms possibly secondary to that.  He agrees this is also a possibility.  We agreed to continue his current medications given that his diabetes is typically well controlled on oral meds.  I will follow-up with him once his other results become available also.    I gave him a [employer] card and another similar card to present to his pharmacy.  I will copy pharm D on my note in the case they can offer any other advice.    Total visit time with patient was 25 mins, all of which was face to face MD time, and over 50% of this time was spent in counseling and coordination of care.  Including post-encounter documentation and orders, total encounter time was 32 mins.      Subjective:  This is a 75-year-old who is well-known to me.  He attends today to follow-up on urinary symptoms which developed about a month ago.  We have already checked out causes for urinary frequency and dysuria and he has had a negative urine culture as well as gonorrhea and chlamydia test.  He did however have glycosuria and upon discussing this with him he had discontinued his  hypoglycemic medications when he was sick for about a 3-week period with COVID-19 infection.  He had restarted the medications and does notice an improvement in his urinary symptoms but they have not fully resolved.  He still notices a need to urinate soon after he has previously urinated but this produces just a small volume.  Any burning pain with urination has resolved.  He also had been constipated when he first noticed the urinary symptoms and we agreed that there can be a connection between the two.    Today, he is also complaining of lower back pain with some radiation into his left buttock.  He says that he has had this on and off for about 20 years.  He was working in a very physically demanding job around 2002 which necessitated lifting weights of over 100 pounds frequently during the day.  He developed back pain and ultimately was terminated from his employment and apparently followed a Worker's Compensation claim for about 2 years until this was closed.  He still has reservations about the fact that he never fully was pain-free even though they told him he was better.  He says that at times he gets recurrence of lower back pain if he sits in poorly supportive furniture.  It radiates into his left buttock but rarely down his legs.  However he recalls that around that time he also had some constipation and urinary symptoms and he hypothesizes that these were related to his lumbar spine.  He says he was told that there was some pressure on the nerves leaving the spine at the time.    ROS:  He reports that his DM2 test strips are extremely expensive - $130 at [employer] and $90 at [employer].  He has medicare but not part D.      Otherwise in follow-up to recent visits he has scheduled an echo to evaluate a heart murmur for 1 week and also has a pending ultrasound to evaluate his kidneys due to some chronic kidney disease.    Objective:  BP (!) 145/81 (BP Location: Left arm, Patient Position: Sitting, Cuff  Size: Adult Regular)   Pulse 79   Temp 98.2  F (36.8  C) (Oral)   Resp 20   Wt 66.3 kg (146 lb 3.2 oz)   SpO2 97%   BMI 22.23 kg/m    His BP is mildly elevated but he has not yet taken his antihypertensive today  He points to approximately the L4 level as the site of his discomfort.  On straight leg raise testing both hamstrings are tight however at about 80 degrees on the left side he notes increased pain in his left buttock suggesting a positive left-sided straight leg raise.

## 2021-06-10 ENCOUNTER — TELEPHONE (OUTPATIENT)
Dept: PHARMACY | Facility: CLINIC | Age: 76
End: 2021-06-10

## 2021-06-10 NOTE — TELEPHONE ENCOUNTER
Earlier this week I was asked by Dr. Christy to look into patient's copayment for diabetic testing supplies.  Dr. Christy stated that patient is being charged nearly $130 for his testing supplies from [facility] pharmacy.  Called [facility] pharmacy and found that  they were unaware that patient has Medicare A and B (they only had a good Rx card for patient).  A [facility] the Medicare number.  Call back today and was told that the diabetic testing supplies were covered under Medicare part B.  Copayment for all 3 should be just under $10 ($3.33 for the strips, $0.28 for the lancets and $5.29 for the meter).     Call patient and relayed the good news to him.    Inga Jim, Pharm.D.

## 2021-07-20 DIAGNOSIS — R80.9 TYPE 2 DIABETES MELLITUS WITH MICROALBUMINURIA, WITHOUT LONG-TERM CURRENT USE OF INSULIN (H): ICD-10-CM

## 2021-07-20 DIAGNOSIS — I10 BENIGN ESSENTIAL HYPERTENSION: ICD-10-CM

## 2021-07-20 DIAGNOSIS — E11.29 TYPE 2 DIABETES MELLITUS WITH MICROALBUMINURIA, WITHOUT LONG-TERM CURRENT USE OF INSULIN (H): ICD-10-CM

## 2021-07-20 RX ORDER — ATORVASTATIN CALCIUM 40 MG/1
40 TABLET, FILM COATED ORAL DAILY
Qty: 90 TABLET | Refills: 1 | Status: CANCELLED | OUTPATIENT
Start: 2021-07-20

## 2021-07-20 RX ORDER — LISINOPRIL 40 MG/1
40 TABLET ORAL DAILY
Qty: 90 TABLET | Refills: 1 | Status: CANCELLED | OUTPATIENT
Start: 2021-07-20

## 2021-07-23 DIAGNOSIS — E11.29 TYPE 2 DIABETES MELLITUS WITH MICROALBUMINURIA, WITHOUT LONG-TERM CURRENT USE OF INSULIN (H): ICD-10-CM

## 2021-07-23 DIAGNOSIS — I10 BENIGN ESSENTIAL HYPERTENSION: ICD-10-CM

## 2021-07-23 DIAGNOSIS — R80.9 TYPE 2 DIABETES MELLITUS WITH MICROALBUMINURIA, WITHOUT LONG-TERM CURRENT USE OF INSULIN (H): ICD-10-CM

## 2021-07-23 RX ORDER — ATORVASTATIN CALCIUM 40 MG/1
40 TABLET, FILM COATED ORAL DAILY
Qty: 90 TABLET | Refills: 1 | Status: SHIPPED | OUTPATIENT
Start: 2021-07-23 | End: 2021-08-13

## 2021-07-23 RX ORDER — LISINOPRIL 40 MG/1
40 TABLET ORAL DAILY
Qty: 90 TABLET | Refills: 1 | Status: SHIPPED | OUTPATIENT
Start: 2021-07-23 | End: 2022-02-11

## 2021-08-13 DIAGNOSIS — R80.9 TYPE 2 DIABETES MELLITUS WITH MICROALBUMINURIA, WITHOUT LONG-TERM CURRENT USE OF INSULIN (H): ICD-10-CM

## 2021-08-13 DIAGNOSIS — E11.29 TYPE 2 DIABETES MELLITUS WITH MICROALBUMINURIA, WITHOUT LONG-TERM CURRENT USE OF INSULIN (H): ICD-10-CM

## 2021-08-13 RX ORDER — ATORVASTATIN CALCIUM 40 MG/1
TABLET, FILM COATED ORAL
Qty: 30 TABLET | Refills: 0 | Status: SHIPPED | OUTPATIENT
Start: 2021-08-13 | End: 2022-02-11

## 2021-08-13 NOTE — TELEPHONE ENCOUNTER
Refilled atorvastatin.  Last LDL (3/26/21) 110, down from 183.    Yuki Nelson MD  (covering for Dr. Christy while out of office)

## 2021-09-09 DIAGNOSIS — E11.29 TYPE 2 DIABETES MELLITUS WITH MICROALBUMINURIA, WITHOUT LONG-TERM CURRENT USE OF INSULIN (H): ICD-10-CM

## 2021-09-09 DIAGNOSIS — R80.9 TYPE 2 DIABETES MELLITUS WITH MICROALBUMINURIA, WITHOUT LONG-TERM CURRENT USE OF INSULIN (H): ICD-10-CM

## 2021-09-10 DIAGNOSIS — E11.29 TYPE 2 DIABETES MELLITUS WITH MICROALBUMINURIA, WITHOUT LONG-TERM CURRENT USE OF INSULIN (H): ICD-10-CM

## 2021-09-10 DIAGNOSIS — R80.9 TYPE 2 DIABETES MELLITUS WITH MICROALBUMINURIA, WITHOUT LONG-TERM CURRENT USE OF INSULIN (H): ICD-10-CM

## 2021-09-10 RX ORDER — GLIPIZIDE 10 MG/1
TABLET, FILM COATED, EXTENDED RELEASE ORAL
Qty: 180 TABLET | Refills: 0 | Status: SHIPPED | OUTPATIENT
Start: 2021-09-10 | End: 2021-12-14

## 2021-09-19 ENCOUNTER — HEALTH MAINTENANCE LETTER (OUTPATIENT)
Age: 76
End: 2021-09-19

## 2021-10-07 ENCOUNTER — TRANSFERRED RECORDS (OUTPATIENT)
Dept: HEALTH INFORMATION MANAGEMENT | Facility: CLINIC | Age: 76
End: 2021-10-07

## 2021-10-28 ENCOUNTER — IMMUNIZATION (OUTPATIENT)
Dept: FAMILY MEDICINE | Facility: CLINIC | Age: 76
End: 2021-10-28
Payer: MEDICARE

## 2021-10-28 DIAGNOSIS — Z23 HIGH PRIORITY FOR 2019-NCOV VACCINE: Primary | ICD-10-CM

## 2021-10-28 PROCEDURE — 91300 COVID-19,PF,PFIZER (12+ YRS): CPT

## 2021-10-28 PROCEDURE — 0004A COVID-19,PF,PFIZER (12+ YRS): CPT

## 2021-11-14 ENCOUNTER — HEALTH MAINTENANCE LETTER (OUTPATIENT)
Age: 76
End: 2021-11-14

## 2021-12-13 DIAGNOSIS — R80.9 TYPE 2 DIABETES MELLITUS WITH MICROALBUMINURIA, WITHOUT LONG-TERM CURRENT USE OF INSULIN (H): ICD-10-CM

## 2021-12-13 DIAGNOSIS — E11.29 TYPE 2 DIABETES MELLITUS WITH MICROALBUMINURIA, WITHOUT LONG-TERM CURRENT USE OF INSULIN (H): ICD-10-CM

## 2021-12-14 RX ORDER — GLIPIZIDE 10 MG/1
TABLET, FILM COATED, EXTENDED RELEASE ORAL
Qty: 180 TABLET | Refills: 0 | Status: SHIPPED | OUTPATIENT
Start: 2021-12-14 | End: 2022-02-18

## 2021-12-30 ENCOUNTER — TELEPHONE (OUTPATIENT)
Dept: FAMILY MEDICINE | Facility: CLINIC | Age: 76
End: 2021-12-30
Payer: MEDICARE

## 2021-12-30 DIAGNOSIS — R80.9 TYPE 2 DIABETES MELLITUS WITH MICROALBUMINURIA, WITHOUT LONG-TERM CURRENT USE OF INSULIN (H): ICD-10-CM

## 2021-12-30 DIAGNOSIS — E11.29 TYPE 2 DIABETES MELLITUS WITH MICROALBUMINURIA, WITHOUT LONG-TERM CURRENT USE OF INSULIN (H): ICD-10-CM

## 2021-12-30 NOTE — TELEPHONE ENCOUNTER
[facility] phone call message- patient requesting a refill:    Full Medication Name: metformin    Dose: ?    [place]arm[place] confirmed as     [facility] [place] [address]  [address] [address]  Phone: [phone] Fax: [phone]  : Yes    Additional Comments: He has been out for two days     OK to leave a message on voice mail? Yes    Primary language: English      needed? No    Call taken on December 30, 2021 at 10:32 AM by Dony Chaudhry

## 2021-12-30 NOTE — TELEPHONE ENCOUNTER
Refilled.  I have tried to impress upon patient that as a diabetic he should be seen at least twice yearly if not more often - I will write him again about this.  That way we would anticipate his refills in advance.

## 2021-12-30 NOTE — TELEPHONE ENCOUNTER
Routed to Dr. Christy. ./RICK   [Well Nourished] : well nourished [No Rash] : no rash [Clear to Auscultation] : lungs were clear to auscultation bilaterally [Regular Rhythm] : with a regular rhythm [Normal Gait] : normal gait [Normal Affect] : the affect was normal [Normal Insight/Judgement] : insight and judgment were intact

## 2021-12-30 NOTE — LETTER
December 30, 2021      Dennis Blum  [address]  [place] [address]        Dear Dennis,    I hope you are well.  As a diabetic, I expect to see you at least twice a year (at least once every 6 months).  Therefore I have refilled all your medications for 6 months.  If you can schedule an appointment in advance of 6 months each time, we will refill all your medications at each visit and you will not run out nor need to call like you did today.  OK?  Could we make that work?  I last saw you in June 2021 so from my perspective you are overdue for a visit.  Take care and Palmerroge Han!!!    Sincerely,    Pavan Christy MD

## 2022-01-06 DIAGNOSIS — R80.9 TYPE 2 DIABETES MELLITUS WITH MICROALBUMINURIA, WITHOUT LONG-TERM CURRENT USE OF INSULIN (H): ICD-10-CM

## 2022-01-06 DIAGNOSIS — E11.29 TYPE 2 DIABETES MELLITUS WITH MICROALBUMINURIA, WITHOUT LONG-TERM CURRENT USE OF INSULIN (H): ICD-10-CM

## 2022-02-11 DIAGNOSIS — E11.29 TYPE 2 DIABETES MELLITUS WITH MICROALBUMINURIA, WITHOUT LONG-TERM CURRENT USE OF INSULIN (H): ICD-10-CM

## 2022-02-11 DIAGNOSIS — R80.9 TYPE 2 DIABETES MELLITUS WITH MICROALBUMINURIA, WITHOUT LONG-TERM CURRENT USE OF INSULIN (H): ICD-10-CM

## 2022-02-11 DIAGNOSIS — I10 BENIGN ESSENTIAL HYPERTENSION: ICD-10-CM

## 2022-02-11 RX ORDER — LISINOPRIL 40 MG/1
TABLET ORAL
Qty: 90 TABLET | Refills: 0 | Status: SHIPPED | OUTPATIENT
Start: 2022-02-11 | End: 2022-02-18

## 2022-02-11 RX ORDER — ATORVASTATIN CALCIUM 40 MG/1
TABLET, FILM COATED ORAL
Qty: 90 TABLET | Refills: 0 | Status: SHIPPED | OUTPATIENT
Start: 2022-02-11 | End: 2022-02-18

## 2022-02-18 ENCOUNTER — OFFICE VISIT (OUTPATIENT)
Dept: FAMILY MEDICINE | Facility: CLINIC | Age: 77
End: 2022-02-18
Payer: MEDICARE

## 2022-02-18 VITALS
RESPIRATION RATE: 18 BRPM | OXYGEN SATURATION: 100 % | BODY MASS INDEX: 23.75 KG/M2 | HEART RATE: 87 BPM | SYSTOLIC BLOOD PRESSURE: 170 MMHG | DIASTOLIC BLOOD PRESSURE: 90 MMHG | WEIGHT: 156.2 LBS | TEMPERATURE: 98.1 F

## 2022-02-18 DIAGNOSIS — R80.9 TYPE 2 DIABETES MELLITUS WITH MICROALBUMINURIA, WITHOUT LONG-TERM CURRENT USE OF INSULIN (H): Primary | ICD-10-CM

## 2022-02-18 DIAGNOSIS — B35.1 ONYCHOMYCOSIS: ICD-10-CM

## 2022-02-18 DIAGNOSIS — Z00.00 PREVENTATIVE HEALTH CARE: ICD-10-CM

## 2022-02-18 DIAGNOSIS — I10 BENIGN ESSENTIAL HYPERTENSION: ICD-10-CM

## 2022-02-18 DIAGNOSIS — L85.3 DRY SKIN: ICD-10-CM

## 2022-02-18 DIAGNOSIS — E11.29 TYPE 2 DIABETES MELLITUS WITH MICROALBUMINURIA, WITHOUT LONG-TERM CURRENT USE OF INSULIN (H): Primary | ICD-10-CM

## 2022-02-18 DIAGNOSIS — L84 CORNS AND CALLOSITIES: ICD-10-CM

## 2022-02-18 DIAGNOSIS — N18.31 CKD STAGE G3A/A3, GFR 45-59 AND ALBUMIN CREATININE RATIO >300 MG/G (H): ICD-10-CM

## 2022-02-18 DIAGNOSIS — K59.04 CHRONIC IDIOPATHIC CONSTIPATION: ICD-10-CM

## 2022-02-18 DIAGNOSIS — L71.9 ROSACEA: ICD-10-CM

## 2022-02-18 DIAGNOSIS — Z51.81 ENCOUNTER FOR THERAPEUTIC DRUG MONITORING: ICD-10-CM

## 2022-02-18 DIAGNOSIS — N18.31 CHRONIC KIDNEY DISEASE (CKD) STAGE G3A/A3, MODERATELY DECREASED GLOMERULAR FILTRATION RATE (GFR) BETWEEN 45-59 ML/MIN/1.73 SQUARE METER AND ALBUMINURIA CREATININE RATIO GREATER THAN 300 MG/G (H): ICD-10-CM

## 2022-02-18 LAB
ALBUMIN SERPL-MCNC: 4.1 G/DL (ref 3.5–5)
ALP SERPL-CCNC: 81 U/L (ref 45–120)
ALT SERPL W P-5'-P-CCNC: 34 U/L (ref 0–45)
ANION GAP SERPL CALCULATED.3IONS-SCNC: 11 MMOL/L (ref 5–18)
AST SERPL W P-5'-P-CCNC: 22 U/L (ref 0–40)
BILIRUB DIRECT SERPL-MCNC: 0.2 MG/DL
BILIRUB SERPL-MCNC: 0.6 MG/DL (ref 0–1)
BUN SERPL-MCNC: 23 MG/DL (ref 8–28)
CALCIUM SERPL-MCNC: 9.3 MG/DL (ref 8.5–10.5)
CHLORIDE BLD-SCNC: 105 MMOL/L (ref 98–107)
CHOLEST SERPL-MCNC: 128 MG/DL
CO2 SERPL-SCNC: 22 MMOL/L (ref 22–31)
CREAT SERPL-MCNC: 1.57 MG/DL (ref 0.7–1.3)
CREAT UR-MCNC: 94 MG/DL
ERYTHROCYTE [DISTWIDTH] IN BLOOD BY AUTOMATED COUNT: 13.1 % (ref 10–15)
FASTING STATUS PATIENT QL REPORTED: NORMAL
GFR SERPL CREATININE-BSD FRML MDRD: 46 ML/MIN/1.73M2
GLUCOSE BLD-MCNC: 184 MG/DL (ref 70–125)
HBA1C MFR BLD: 7.7 % (ref 0–5.6)
HCT VFR BLD AUTO: 43.2 % (ref 40–53)
HDLC SERPL-MCNC: 40 MG/DL
HGB BLD-MCNC: 14.3 G/DL (ref 13.3–17.7)
LDLC SERPL CALC-MCNC: 72 MG/DL
MCH RBC QN AUTO: 29.1 PG (ref 26.5–33)
MCHC RBC AUTO-ENTMCNC: 33.1 G/DL (ref 31.5–36.5)
MCV RBC AUTO: 88 FL (ref 78–100)
MICROALBUMIN UR-MCNC: 87.49 MG/DL (ref 0–1.99)
MICROALBUMIN/CREAT UR: 930.7 MG/G CR
PLATELET # BLD AUTO: 317 10E3/UL (ref 150–450)
POTASSIUM BLD-SCNC: 5.1 MMOL/L (ref 3.5–5)
PROT SERPL-MCNC: 7.6 G/DL (ref 6–8)
RBC # BLD AUTO: 4.92 10E6/UL (ref 4.4–5.9)
SODIUM SERPL-SCNC: 138 MMOL/L (ref 136–145)
TRIGL SERPL-MCNC: 80 MG/DL
WBC # BLD AUTO: 8 10E3/UL (ref 4–11)

## 2022-02-18 PROCEDURE — 11055 PARING/CUTG B9 HYPRKER LES 1: CPT | Performed by: FAMILY MEDICINE

## 2022-02-18 PROCEDURE — 80053 COMPREHEN METABOLIC PANEL: CPT | Performed by: FAMILY MEDICINE

## 2022-02-18 PROCEDURE — 36415 COLL VENOUS BLD VENIPUNCTURE: CPT | Performed by: FAMILY MEDICINE

## 2022-02-18 PROCEDURE — 99214 OFFICE O/P EST MOD 30 MIN: CPT | Mod: 25 | Performed by: FAMILY MEDICINE

## 2022-02-18 PROCEDURE — 82248 BILIRUBIN DIRECT: CPT | Performed by: FAMILY MEDICINE

## 2022-02-18 PROCEDURE — 83036 HEMOGLOBIN GLYCOSYLATED A1C: CPT | Performed by: FAMILY MEDICINE

## 2022-02-18 PROCEDURE — 82043 UR ALBUMIN QUANTITATIVE: CPT | Performed by: FAMILY MEDICINE

## 2022-02-18 PROCEDURE — 80061 LIPID PANEL: CPT | Performed by: FAMILY MEDICINE

## 2022-02-18 PROCEDURE — 85027 COMPLETE CBC AUTOMATED: CPT | Performed by: FAMILY MEDICINE

## 2022-02-18 RX ORDER — ITRACONAZOLE 100 MG/1
CAPSULE ORAL
Qty: 112 CAPSULE | Refills: 0 | Status: SHIPPED | OUTPATIENT
Start: 2022-02-18 | End: 2023-02-09

## 2022-02-18 RX ORDER — GLIPIZIDE 10 MG/1
TABLET, FILM COATED, EXTENDED RELEASE ORAL
Qty: 180 TABLET | Refills: 1 | Status: SHIPPED | OUTPATIENT
Start: 2022-02-18 | End: 2022-03-21

## 2022-02-18 RX ORDER — LISINOPRIL 40 MG/1
40 TABLET ORAL DAILY
Qty: 90 TABLET | Refills: 1 | Status: SHIPPED | OUTPATIENT
Start: 2022-02-18 | End: 2023-01-18

## 2022-02-18 RX ORDER — LISINOPRIL 40 MG/1
40 TABLET ORAL DAILY
Qty: 90 TABLET | Refills: 0 | Status: SHIPPED | OUTPATIENT
Start: 2022-02-18 | End: 2022-02-18

## 2022-02-18 RX ORDER — CALCIUM POLYCARBOPHIL 625 MG 625 MG/1
1 TABLET ORAL DAILY
Qty: 90 TABLET | Refills: 1 | Status: SHIPPED | OUTPATIENT
Start: 2022-02-18 | End: 2023-02-09

## 2022-02-18 RX ORDER — METRONIDAZOLE 7.5 MG/G
GEL TOPICAL 2 TIMES DAILY
Qty: 135 G | Refills: 1 | Status: SHIPPED | OUTPATIENT
Start: 2022-02-18 | End: 2023-02-09

## 2022-02-18 RX ORDER — ATORVASTATIN CALCIUM 40 MG/1
40 TABLET, FILM COATED ORAL DAILY
Qty: 90 TABLET | Refills: 0 | Status: SHIPPED | OUTPATIENT
Start: 2022-02-18 | End: 2022-02-18

## 2022-02-18 RX ORDER — BETAMETHASONE DIPROPIONATE 0.5 MG/G
LOTION TOPICAL 2 TIMES DAILY
Qty: 60 ML | Refills: 5 | Status: SHIPPED | OUTPATIENT
Start: 2022-02-18 | End: 2023-02-09

## 2022-02-18 RX ORDER — AMMONIUM LACTATE 12 G/100G
LOTION TOPICAL 2 TIMES DAILY
Qty: 225 G | Refills: 3 | Status: SHIPPED | OUTPATIENT
Start: 2022-02-18 | End: 2023-02-09

## 2022-02-18 RX ORDER — GLIPIZIDE 10 MG/1
TABLET, FILM COATED, EXTENDED RELEASE ORAL
Qty: 180 TABLET | Refills: 0 | Status: SHIPPED | OUTPATIENT
Start: 2022-02-18 | End: 2022-02-18

## 2022-02-18 RX ORDER — ATORVASTATIN CALCIUM 40 MG/1
40 TABLET, FILM COATED ORAL DAILY
Qty: 90 TABLET | Refills: 1 | Status: SHIPPED | OUTPATIENT
Start: 2022-02-18 | End: 2022-05-12

## 2022-02-18 NOTE — LETTER
February 24, 2022      Dennis Blum  [address]  [place] [address]        Dear Mr.Salas Blum,    We are writing to inform you of your test results.    Checo Collins - the main concerns here are that you are losing protein in your urine and that your kidney function (GFR) is reduced.  These are both signs of chronic kidney disease - which is very common in diabetics.  The best things you can do are:   1. Control your blood sugars (which you are doing quite well - A1c=7.7)   2. Control your blood pressures (these were high in clinic - we need them to remain under 140 / under 90   3. Take Lisinopril every day - this is a helpful medication for kidney disease   4. Visit with a kidney specialist.  You are now at the stage of a recommendation to see a nephrology doctor.  I will wait till I hear back from you to place that referral.     I also want to know whether you started Empagliflozin (Jardiance) in the place of Glipizide - that is a good idea given what I have said here about your kidneys.  OK?  Can you reply to this message?  Thank you Dennis Christy       Resulted Orders   Hemoglobin A1c   Result Value Ref Range    Hemoglobin A1C 7.7 (H) 0.0 - 5.6 %      Comment:      Normal <5.7%   Prediabetes 5.7-6.4%    Diabetes 6.5% or higher     Note: Adopted from ADA consensus guidelines.   Basic metabolic panel   Result Value Ref Range    Sodium 138 136 - 145 mmol/L    Potassium 5.1 (H) 3.5 - 5.0 mmol/L    Chloride 105 98 - 107 mmol/L    Carbon Dioxide (CO2) 22 22 - 31 mmol/L    Anion Gap 11 5 - 18 mmol/L    Urea Nitrogen 23 8 - 28 mg/dL    Creatinine 1.57 (H) 0.70 - 1.30 mg/dL    Calcium 9.3 8.5 - 10.5 mg/dL    Glucose 184 (H) 70 - 125 mg/dL    GFR Estimate 46 (L) >60 mL/min/1.73m2      Comment:      Effective December 21, 2021 eGFRcr in adults is calculated using the 2021 CKD-EPI creatinine equation which includes age and gender (Logan et al., NEJM, DOI: 10.1056/XSZPlg4989935)   Albumin Random Urine  Quantitative with Creat Ratio   Result Value Ref Range    Microalbumin Urine mg/dL 87.49 (H) 0.00 - 1.99 mg/dL    Creatinine Urine mg/dL 94 mg/dL    Microalbumin Urine mg/g Cr 930.7 (H) <=19.9 mg/g Cr    Narrative    Microalbumin, Random Urine   <2.0 mg/dL . . . . . . . . Normal   3.0-30.0 mg/dL . . . . . . Microalbuminuria   >30.0 mg/dL . . . . . .  . Clinical Proteinuria     Microalbumin/Creatinine Ratio, Random Urine   <20 mg/g . . . . .. . . . Normal    mg/g . . . . . . . Microalbuminuria   >300 mg/g . . . . . . . . Clinical Proteinuria   Lipid Panel   Result Value Ref Range    Cholesterol 128 <=199 mg/dL    Triglycerides 80 <=149 mg/dL    Direct Measure HDL 40 >=40 mg/dL      Comment:      HDL Cholesterol Reference Range:     0-2 years:   No reference ranges established for patients under 2 years old  at [facility] for lipid analytes.    2-8 years:  Greater than 45 mg/dL     18 years and older:   Female: Greater than or equal to 50 mg/dL   Male:   Greater than or equal to 40 mg/dL    LDL Cholesterol Calculated 72 <=129 mg/dL    Patient Fasting > 8hrs? Unknown    CBC with platelets   Result Value Ref Range    WBC Count 8.0 4.0 - 11.0 10e3/uL    RBC Count 4.92 4.40 - 5.90 10e6/uL    Hemoglobin 14.3 13.3 - 17.7 g/dL    Hematocrit 43.2 40.0 - 53.0 %    MCV 88 78 - 100 fL    MCH 29.1 26.5 - 33.0 pg    MCHC 33.1 31.5 - 36.5 g/dL    RDW 13.1 10.0 - 15.0 %    Platelet Count 317 150 - 450 10e3/uL   Hepatic function panel   Result Value Ref Range    Bilirubin Total 0.6 0.0 - 1.0 mg/dL    Bilirubin Direct 0.2 <=0.5 mg/dL    Protein Total 7.6 6.0 - 8.0 g/dL    Albumin 4.1 3.5 - 5.0 g/dL    Alkaline Phosphatase 81 45 - 120 U/L    AST 22 0 - 40 U/L    ALT 34 0 - 45 U/L       If you have any questions or concerns, please call the clinic at the number listed above.       Sincerely,      Pavan Christy MD

## 2022-02-18 NOTE — PATIENT INSTRUCTIONS
When you are taking toenail fungus medicine, cut the dose of cholesterol medicine (ATORVASTATIN)      03/14/22   NEPHROLOGY REFERRAL  [facility]  [address], [address]  [place] [address]  Phone: [phone]  Fax: [phone]    Referral, demographics, office visit and medication list faxed to [phone]    Shahla Raymundo

## 2022-02-19 NOTE — PROGRESS NOTES
Dennis was seen today for follow up and derm problem.    Diagnoses and all orders for this visit:    Type 2 diabetes mellitus with microalbuminuria, without long-term current use of insulin (H)  -     Hemoglobin A1c  -     Basic metabolic panel  -     Albumin Random Urine Quantitative with Creat Ratio  -     Lipid Panel  -     Discontinue: atorvastatin (LIPITOR) 40 MG tablet; Take 1 tablet (40 mg) by mouth daily  -     blood glucose (NO BRAND SPECIFIED) lancets standard; Use to test blood sugar 1 time daily  -     blood glucose (NO BRAND SPECIFIED) test strip; Use to test blood sugar 1 time daily  -     Discontinue: glipiZIDE (GLUCOTROL XL) 10 MG 24 hr tablet; TAKE TWO TABLETS BY MOUTH EVERY DAY  -     Discontinue: metFORMIN (GLUCOPHAGE) 500 MG tablet; TAKE 2 TABLETS BY MOUTH TWICE DAILY WITH MEALS  -     empagliflozin (JARDIANCE) 10 MG TABS tablet; Take 1 tablet (10 mg) by mouth daily  -     atorvastatin (LIPITOR) 40 MG tablet; Take 1 tablet (40 mg) by mouth daily  -     glipiZIDE (GLUCOTROL XL) 10 MG 24 hr tablet; TAKE TWO TABLETS BY MOUTH EVERY DAY  -     metFORMIN (GLUCOPHAGE) 500 MG tablet; TAKE 2 TABLETS BY MOUTH TWICE DAILY WITH MEALS  -     Adult Nephrology  Referral; Future    CKD stage G3a/A3, GFR 45-59 and albumin creatinine ratio >300 mg/g (H)  -     CBC with platelets    Dry skin  -     ammonium lactate (LAC-HYDRIN) 12 % external lotion; Apply topically 2 times daily  -     betamethasone dipropionate (DIPROSONE) 0.05 % external lotion; Apply topically 2 times daily To back and scalp PRN itching    Rosacea  -     metroNIDAZOLE (METROGEL) 0.75 % external gel; Apply topically 2 times daily    Preventative health care  -     aspirin (ASA) 81 MG EC tablet; Take 1 tablet (81 mg) by mouth daily    Chronic idiopathic constipation  -     calcium polycarbophil (FIBERCON) 625 MG tablet; Take 1 tablet (625 mg) by mouth daily    Benign essential hypertension  -     Discontinue: lisinopril (ZESTRIL) 40 MG  tablet; Take 1 tablet (40 mg) by mouth daily  -     lisinopril (ZESTRIL) 40 MG tablet; Take 1 tablet (40 mg) by mouth daily  -     Adult Nephrology  Referral; Future    Onychomycosis  -     itraconazole (SPORANOX) 100 MG capsule; Take 2 pills (200mgs) twice a day for 1 week.  Repeat every 4 weeks for 3 months.    Encounter for therapeutic drug monitoring  -     Hepatic function panel; Future  -     Hepatic function panel    Corns and callosities  -     SHAV SKIN LESION SCLP/NCK/HNDS/FEET/GEN 1.1-2.0 CM    Chronic kidney disease (CKD) stage G3a/A3, moderately decreased glomerular filtration rate (GFR) between 45-59 mL/min/1.73 square meter and albuminuria creatinine ratio greater than 300 mg/g (H)  -     Adult Nephrology  Referral; Future      I refilled all of his medications.  He would like to take a treatment for his onychomycosis and I have prescribed pulsed itraconazole dosing for this purpose.  I double checked that his liver function at baseline is normal.    At the time of dictation his labs have returned and his GFR has declined along with evidence of proteinuria indicating chronic kidney disease.  I will notify him of this by writing.  We did discuss that controlling his diabetes and hypertension were important steps that he can continue to follow.    I have asked him to return every 3 to 6 months in close follow-up.    Total visit time with patient was 25 mins, all of which was face to face MD time, and over 50% of this time was spent in counseling and coordination of care.  Including post-encounter documentation and orders, total encounter time was 32 mins.      Subjective:  This is a 76-year-old who is well-known to me. He attends today primarily for a routine diabetes and chronic disease follow-up. He checks his blood sugars usually once every other day and reports that the highest number he has seen is 145. He is very careful about what he eats and understands that carbohydrates will  increase his blood sugars.    Otherwise, he explains that the reason that he is generally reluctant to attend clinic is that there has been some disagreement about his payment for clinic expenses. He reports that someone from this organization was personally offensive to him in a telephone conversation and this has decreased his desire to attend regularly.    He reports that he is working 2 jobs and feels very capable and engaged at this time.    ROS:  Extremities: He does report that he has had a recurrence of calluses on the soles of his feet and would like these addressed if possible. They were pared previously and not helped his symptoms significantly. In addition he has what he believes is fungal infection of his toenails and would love a treatment for this. He has tried topical antifungal treatments that have not worked.    Derm: He does have an patch of dermatitis which is very itchy along his lower back. He cannot say what necessarily causes this but it is usually worse in the winter. He also gets some itchiness of his scalp. He does shave his scalp. He can have dandruff if he is not careful.    He also would like to refill metronidazole gel which he has found helpful for his facial rosacea.    Objective:  BP (!) 170/90 (BP Location: Right arm, Patient Position: Sitting, Cuff Size: Adult Regular)   Pulse 87   Temp 98.1  F (36.7  C) (Oral)   Resp 18   Wt 70.9 kg (156 lb 3.2 oz)   SpO2 100%   BMI 23.75 kg/m    His blood pressure is elevated today. He says he checks this periodically and it is usually satisfactory under 140/90 and he therefore does not want to change his antihypertensive medications today. His weight is ideal.  His scalp is examined and there is no specific dermatitis visible today. His lower back is examined and he does have a faint macular rash without any particular features suggestive of an infective diagnosis.    His feet are examined and he does appear to have onychomycosis  affecting several toenails.    He consents to having the calluses on both soles pared and I personally performed this taking 10 minutes of my time. I removed several layers of thickened skin without causing any damage. He could appreciate the improved feeling immediately.    Results for orders placed or performed in visit on 02/18/22   Hemoglobin A1c     Status: Abnormal   Result Value Ref Range    Hemoglobin A1C 7.7 (H) 0.0 - 5.6 %   Basic metabolic panel     Status: Abnormal   Result Value Ref Range    Sodium 138 136 - 145 mmol/L    Potassium 5.1 (H) 3.5 - 5.0 mmol/L    Chloride 105 98 - 107 mmol/L    Carbon Dioxide (CO2) 22 22 - 31 mmol/L    Anion Gap 11 5 - 18 mmol/L    Urea Nitrogen 23 8 - 28 mg/dL    Creatinine 1.57 (H) 0.70 - 1.30 mg/dL    Calcium 9.3 8.5 - 10.5 mg/dL    Glucose 184 (H) 70 - 125 mg/dL    GFR Estimate 46 (L) >60 mL/min/1.73m2   Albumin Random Urine Quantitative with Creat Ratio     Status: Abnormal   Result Value Ref Range    Microalbumin Urine mg/dL 87.49 (H) 0.00 - 1.99 mg/dL    Creatinine Urine mg/dL 94 mg/dL    Microalbumin Urine mg/g Cr 930.7 (H) <=19.9 mg/g Cr    Narrative    Microalbumin, Random Urine   <2.0 mg/dL . . . . . . . . Normal   3.0-30.0 mg/dL . . . . . . Microalbuminuria   >30.0 mg/dL . . . . . .  . Clinical Proteinuria     Microalbumin/Creatinine Ratio, Random Urine   <20 mg/g . . . . .. . . . Normal    mg/g . . . . . . . Microalbuminuria   >300 mg/g . . . . . . . . Clinical Proteinuria   Lipid Panel     Status: None   Result Value Ref Range    Cholesterol 128 <=199 mg/dL    Triglycerides 80 <=149 mg/dL    Direct Measure HDL 40 >=40 mg/dL    LDL Cholesterol Calculated 72 <=129 mg/dL    Patient Fasting > 8hrs? Unknown    CBC with platelets     Status: Normal   Result Value Ref Range    WBC Count 8.0 4.0 - 11.0 10e3/uL    RBC Count 4.92 4.40 - 5.90 10e6/uL    Hemoglobin 14.3 13.3 - 17.7 g/dL    Hematocrit 43.2 40.0 - 53.0 %    MCV 88 78 - 100 fL    MCH 29.1 26.5 - 33.0  pg    MCHC 33.1 31.5 - 36.5 g/dL    RDW 13.1 10.0 - 15.0 %    Platelet Count 317 150 - 450 10e3/uL   Hepatic function panel     Status: Normal   Result Value Ref Range    Bilirubin Total 0.6 0.0 - 1.0 mg/dL    Bilirubin Direct 0.2 <=0.5 mg/dL    Protein Total 7.6 6.0 - 8.0 g/dL    Albumin 4.1 3.5 - 5.0 g/dL    Alkaline Phosphatase 81 45 - 120 U/L    AST 22 0 - 40 U/L    ALT 34 0 - 45 U/L

## 2022-02-24 NOTE — RESULT ENCOUNTER NOTE
Checo Collins - the main concerns here are that you are losing protein in your urine and that your kidney function (GFR) is reduced.  These are both signs of chronic kidney disease - which is very common in diabetics.  The best things you can do are:   1. Control your blood sugars (which you are doing quite well - A1c=7.7)  2. Control your blood pressures (these were high in clinic - we need them to remain under 140 / under 90  3. Take Lisinopril every day - this is a helpful medication for kidney disease  4. Visit with a kidney specialist.  You are now at the stage of a recommendation to see a nephrology doctor.  I will wait till I hear back from you to place that referral.    I also want to know whether you started Empagliflozin (Jardiance) in the place of Glipizide - that is a good idea given what I have said here about your kidneys.  OK?  Can you reply to this message?  Thank you Dennis Christy

## 2022-03-19 DIAGNOSIS — E11.29 TYPE 2 DIABETES MELLITUS WITH MICROALBUMINURIA, WITHOUT LONG-TERM CURRENT USE OF INSULIN (H): ICD-10-CM

## 2022-03-19 DIAGNOSIS — R80.9 TYPE 2 DIABETES MELLITUS WITH MICROALBUMINURIA, WITHOUT LONG-TERM CURRENT USE OF INSULIN (H): ICD-10-CM

## 2022-03-21 RX ORDER — GLIPIZIDE 10 MG/1
TABLET, FILM COATED, EXTENDED RELEASE ORAL
Qty: 180 TABLET | Refills: 0 | Status: SHIPPED | OUTPATIENT
Start: 2022-03-21 | End: 2022-10-03

## 2022-05-01 ENCOUNTER — HEALTH MAINTENANCE LETTER (OUTPATIENT)
Age: 77
End: 2022-05-01

## 2022-05-12 DIAGNOSIS — E11.29 TYPE 2 DIABETES MELLITUS WITH MICROALBUMINURIA, WITHOUT LONG-TERM CURRENT USE OF INSULIN (H): ICD-10-CM

## 2022-05-12 DIAGNOSIS — R80.9 TYPE 2 DIABETES MELLITUS WITH MICROALBUMINURIA, WITHOUT LONG-TERM CURRENT USE OF INSULIN (H): ICD-10-CM

## 2022-05-12 RX ORDER — ATORVASTATIN CALCIUM 40 MG/1
TABLET, FILM COATED ORAL
Qty: 90 TABLET | Refills: 1 | Status: SHIPPED | OUTPATIENT
Start: 2022-05-12 | End: 2023-02-09

## 2022-07-08 ENCOUNTER — PATIENT OUTREACH (OUTPATIENT)
Dept: FAMILY MEDICINE | Facility: CLINIC | Age: 77
End: 2022-07-08

## 2022-07-08 NOTE — TELEPHONE ENCOUNTER
Patient Quality Outreach      Summary:    Patient has the following on his problem list/HM:     Diabetes    Last A1C:  Lab Results   Component Value Date    A1C 7.7 02/18/2022    A1C 6.8 03/26/2021    A1C 6.8 02/03/2020       Last LDL:    Lab Results   Component Value Date    LDL 72 02/18/2022     03/26/2021       Is the patient on a Statin?          Is the patient on Aspirin?    Medications     HMG CoA Reductase Inhibitors     atorvastatin (LIPITOR) 40 MG tablet       Salicylates     aspirin (ASA) 81 MG EC tablet             Last three blood pressure readings:  BP Readings from Last 3 Encounters:   02/18/22 (!) 170/90   06/07/21 (!) 145/81   05/06/21 (!) 150/77          Tobacco Use      Smoking status: Never Smoker      Smokeless tobacco: Never Used          Patient is due/failing the following:   Diabetes -  A1C, BP Check and Diabetic Follow-Up Visit    Type of outreach:    Phone, left message for patient/parent to call back.    Questions for provider review:    None                                                                                                                                     BRENNAN Puckett  2:48 PM  7/8/2022       Chart routed to Provider.

## 2022-08-21 ENCOUNTER — HEALTH MAINTENANCE LETTER (OUTPATIENT)
Age: 77
End: 2022-08-21

## 2022-10-02 DIAGNOSIS — E11.29 TYPE 2 DIABETES MELLITUS WITH MICROALBUMINURIA, WITHOUT LONG-TERM CURRENT USE OF INSULIN (H): ICD-10-CM

## 2022-10-02 DIAGNOSIS — R80.9 TYPE 2 DIABETES MELLITUS WITH MICROALBUMINURIA, WITHOUT LONG-TERM CURRENT USE OF INSULIN (H): ICD-10-CM

## 2022-10-03 ENCOUNTER — TELEPHONE (OUTPATIENT)
Dept: FAMILY MEDICINE | Facility: CLINIC | Age: 77
End: 2022-10-03

## 2022-10-03 RX ORDER — GLIPIZIDE 10 MG/1
TABLET, FILM COATED, EXTENDED RELEASE ORAL
Qty: 180 TABLET | Refills: 0 | Status: SHIPPED | OUTPATIENT
Start: 2022-10-03 | End: 2023-01-16

## 2022-10-03 NOTE — TELEPHONE ENCOUNTER
Reach out to patient to assist with scheduling appointment. Patient did not , left message to call clinic for appointment. Odette De La Cruz, [facility]

## 2022-11-21 ENCOUNTER — HEALTH MAINTENANCE LETTER (OUTPATIENT)
Age: 77
End: 2022-11-21

## 2023-01-03 DIAGNOSIS — E11.29 TYPE 2 DIABETES MELLITUS WITH MICROALBUMINURIA, WITHOUT LONG-TERM CURRENT USE OF INSULIN (H): ICD-10-CM

## 2023-01-03 DIAGNOSIS — R80.9 TYPE 2 DIABETES MELLITUS WITH MICROALBUMINURIA, WITHOUT LONG-TERM CURRENT USE OF INSULIN (H): ICD-10-CM

## 2023-01-04 ENCOUNTER — TELEPHONE (OUTPATIENT)
Dept: FAMILY MEDICINE | Facility: CLINIC | Age: 78
End: 2023-01-04

## 2023-01-04 NOTE — TELEPHONE ENCOUNTER
Per provider, called patient, no answer. Left message to call clinic to set up ofv. Odette De La Cruz CMA

## 2023-01-15 DIAGNOSIS — E11.29 TYPE 2 DIABETES MELLITUS WITH MICROALBUMINURIA, WITHOUT LONG-TERM CURRENT USE OF INSULIN (H): ICD-10-CM

## 2023-01-15 DIAGNOSIS — R80.9 TYPE 2 DIABETES MELLITUS WITH MICROALBUMINURIA, WITHOUT LONG-TERM CURRENT USE OF INSULIN (H): ICD-10-CM

## 2023-01-16 RX ORDER — GLIPIZIDE 10 MG/1
TABLET, FILM COATED, EXTENDED RELEASE ORAL
Qty: 180 TABLET | Refills: 0 | Status: SHIPPED | OUTPATIENT
Start: 2023-01-16 | End: 2023-02-09

## 2023-01-18 ENCOUNTER — OFFICE VISIT (OUTPATIENT)
Dept: FAMILY MEDICINE | Facility: CLINIC | Age: 78
End: 2023-01-18
Payer: MEDICARE

## 2023-01-18 VITALS
BODY MASS INDEX: 23.92 KG/M2 | HEIGHT: 68 IN | HEART RATE: 71 BPM | RESPIRATION RATE: 20 BRPM | TEMPERATURE: 97.7 F | SYSTOLIC BLOOD PRESSURE: 177 MMHG | DIASTOLIC BLOOD PRESSURE: 76 MMHG | WEIGHT: 157.8 LBS | OXYGEN SATURATION: 100 %

## 2023-01-18 DIAGNOSIS — I10 BENIGN ESSENTIAL HYPERTENSION: Primary | ICD-10-CM

## 2023-01-18 DIAGNOSIS — N52.9 ERECTILE DYSFUNCTION, UNSPECIFIED ERECTILE DYSFUNCTION TYPE: ICD-10-CM

## 2023-01-18 DIAGNOSIS — E11.29 TYPE 2 DIABETES MELLITUS WITH MICROALBUMINURIA, WITHOUT LONG-TERM CURRENT USE OF INSULIN (H): ICD-10-CM

## 2023-01-18 DIAGNOSIS — L20.9 ATOPIC DERMATITIS, UNSPECIFIED TYPE: ICD-10-CM

## 2023-01-18 DIAGNOSIS — R80.9 TYPE 2 DIABETES MELLITUS WITH MICROALBUMINURIA, WITHOUT LONG-TERM CURRENT USE OF INSULIN (H): ICD-10-CM

## 2023-01-18 DIAGNOSIS — R01.1 HEART MURMUR: ICD-10-CM

## 2023-01-18 PROCEDURE — 99214 OFFICE O/P EST MOD 30 MIN: CPT | Mod: GC | Performed by: STUDENT IN AN ORGANIZED HEALTH CARE EDUCATION/TRAINING PROGRAM

## 2023-01-18 RX ORDER — SILDENAFIL 100 MG/1
50 TABLET, FILM COATED ORAL DAILY PRN
Qty: 10 TABLET | Refills: 0 | Status: SHIPPED | OUTPATIENT
Start: 2023-01-18 | End: 2023-02-09

## 2023-01-18 RX ORDER — AMLODIPINE BESYLATE 5 MG/1
5 TABLET ORAL DAILY
Qty: 30 TABLET | Refills: 0 | Status: SHIPPED | OUTPATIENT
Start: 2023-01-18 | End: 2023-02-09

## 2023-01-18 RX ORDER — TRIAMCINOLONE ACETONIDE 5 MG/G
1 OINTMENT TOPICAL 2 TIMES DAILY
Qty: 15 G | Refills: 0 | Status: SHIPPED | OUTPATIENT
Start: 2023-01-18 | End: 2023-02-09

## 2023-01-18 RX ORDER — LISINOPRIL 40 MG/1
40 TABLET ORAL DAILY
Qty: 90 TABLET | Refills: 1 | Status: SHIPPED | OUTPATIENT
Start: 2023-01-18 | End: 2023-02-09

## 2023-01-18 NOTE — PROGRESS NOTES
Preceptor Attestation:    I discussed the patient with the resident and evaluated the patient in person. I have verified the content of the note, which accurately reflects my assessment of the patient and the plan of care.   Supervising Physician:  Chris Taylor DO.

## 2023-01-18 NOTE — PROGRESS NOTES
Assessment & Plan     Benign essential hypertension  Dennis is a 77-year-old man with history of hypertension and diabetes presenting for multiple concerns.  Blood pressure 177/76 today.  Current regimen lisinopril 40 mg daily.  We will add in amlodipine 5 mg daily.  Recommended recheck in 2 weeks with PCP.  Patient also with history of diabetes and CKD, due for recheck of labs.  - lisinopril (ZESTRIL) 40 MG tablet  Dispense: 90 tablet; Refill: 1  - amLODIPine (NORVASC) 5 MG tablet  Dispense: 30 tablet; Refill: 0    Erectile dysfunction, unspecified erectile dysfunction type  Patient noting trouble getting and maintaining erections during the day, but no difficulty at night.  Still having morning erections.  Concern for uncontrolled blood pressure and diabetes that could be contributing to erectile dysfunction.  Strongly recommended follow-up to ensure better control of these conditions.  We are adding a blood pressure medication today but given that his blood pressure was quite elevated at 177/76, safe to also try Viagra if patient would like.  Also discussed that he could hold off on trying Viagra until the recheck visit.  Recommended sildenafil 50 mg as needed.  - sildenafil (VIAGRA) 100 MG tablet  Dispense: 10 tablet; Refill: 0    Type 2 diabetes mellitus with microalbuminuria, without long-term current use of insulin (H)  Patient needs refill of metformin.  Recommended return in 2 weeks for full diabetes check as well as repeat check of blood pressure and blood work.  - metFORMIN (GLUCOPHAGE) 500 MG tablet  Dispense: 120 tablet; Refill: 0    Heart murmur  Holosystolic murmur noted on the left lower sternal border over the tricuspid valve.  Echocardiogram ordered for further investigation.  No signs of heart failure on exam.  - Echocardiogram Complete    Atopic dermatitis, unspecified type  Diffusely dry skin, with mild maculopapular rash on back and scalp consistent with eczema.  Recommended treatment with  "triamcinolone twice daily until rash resolves, as well as CeraVe twice daily or more as needed to improve dry skin.  Recommended recheck in 2 weeks.  - triamcinolone (KENALOG) 0.5 % external ointment  Dispense: 15 g; Refill: 0    Return in about 2 weeks (around 2/1/2023) for HTN, diabetes, erectile dysfunction, recheck labs.    Patient was discussed with attending physician, Dr. Steve Taylor, who agrees with the assessment and plan.    Yoana Meza MD, PGY-3  [facility]  1/18/2023      Subjective   Dennis Blum is a 77 year old male who presents for the following health issues     Chief Complaint   Patient presents with     RECHECK     Itching and rash on back and sides of body      Has had a rash on his back   Sometimes on the sides  Tried a silver soap which was recommended by a dermatologist many years ago  Does not think it is helping  Not using any lotions or topical medications on his body  Uses Selena lotion on his face    Needs a refill of metformin    Blood pressure elevated to 172/80. Current antihypertensive regimen: lisinopril 40 mg daily.  Checked blood pressure at [facility] about 1 week ago, was 156/79    Interested in trying Viagra  Trouble with erections during the day  Does not get a full erection during the day  No difficulties at night  Wakes with erections still      Objective    Vitals:    01/18/23 0806 01/18/23 0810   BP: (!) 172/80 (!) 177/76   Pulse: 71    Resp: 20    Temp: 97.7  F (36.5  C)    TempSrc: Oral    SpO2: 100%    Weight: 71.6 kg (157 lb 12.8 oz)    Height: 1.72 m (5' 7.7\")      Body mass index is 24.21 kg/m .  Physical Exam   General: alert, appears comfortable, no acute distress  HEENT: atraumatic, conjunctiva clear without erythema, EOM's intact, no nasal discharge, MMM  Neck: supple  Cardiac: normal rate and rhythm, holosystolic murmur noted on the left lower sternal border  Resp: lungs clear to auscultation bilaterally with no crackles or wheezes, " no increased work of breathing  Skin: Back with scattered maculopapular erythematous rash consistent with atopic dermatitis, dry skin diffusely, mild maculopapular rash on scalp also consistent with atopic dermatitis  Neuro: CN's grossly intact  Psych: affect congruent with mood

## 2023-02-09 ENCOUNTER — OFFICE VISIT (OUTPATIENT)
Dept: FAMILY MEDICINE | Facility: CLINIC | Age: 78
End: 2023-02-09
Payer: MEDICARE

## 2023-02-09 VITALS
OXYGEN SATURATION: 99 % | DIASTOLIC BLOOD PRESSURE: 80 MMHG | SYSTOLIC BLOOD PRESSURE: 161 MMHG | HEART RATE: 74 BPM | RESPIRATION RATE: 12 BRPM | WEIGHT: 154.8 LBS | TEMPERATURE: 97.5 F | BODY MASS INDEX: 23.46 KG/M2 | HEIGHT: 68 IN

## 2023-02-09 DIAGNOSIS — N18.31 CKD STAGE G3A/A3, GFR 45-59 AND ALBUMIN CREATININE RATIO >300 MG/G (H): ICD-10-CM

## 2023-02-09 DIAGNOSIS — K59.04 CHRONIC IDIOPATHIC CONSTIPATION: ICD-10-CM

## 2023-02-09 DIAGNOSIS — I10 BENIGN ESSENTIAL HYPERTENSION: ICD-10-CM

## 2023-02-09 DIAGNOSIS — L71.9 ROSACEA: ICD-10-CM

## 2023-02-09 DIAGNOSIS — E11.29 TYPE 2 DIABETES MELLITUS WITH MICROALBUMINURIA, WITHOUT LONG-TERM CURRENT USE OF INSULIN (H): Primary | ICD-10-CM

## 2023-02-09 DIAGNOSIS — R80.9 TYPE 2 DIABETES MELLITUS WITH MICROALBUMINURIA, WITHOUT LONG-TERM CURRENT USE OF INSULIN (H): Primary | ICD-10-CM

## 2023-02-09 DIAGNOSIS — Z00.00 PREVENTATIVE HEALTH CARE: ICD-10-CM

## 2023-02-09 DIAGNOSIS — Z23 HIGH PRIORITY FOR 2019-NCOV VACCINE: ICD-10-CM

## 2023-02-09 DIAGNOSIS — N52.9 ERECTILE DYSFUNCTION, UNSPECIFIED ERECTILE DYSFUNCTION TYPE: ICD-10-CM

## 2023-02-09 DIAGNOSIS — L30.0 NUMMULAR ECZEMA: ICD-10-CM

## 2023-02-09 DIAGNOSIS — Z13.220 SCREENING FOR HYPERLIPIDEMIA: ICD-10-CM

## 2023-02-09 DIAGNOSIS — L20.9 ATOPIC DERMATITIS, UNSPECIFIED TYPE: ICD-10-CM

## 2023-02-09 PROCEDURE — 0124A COVID-19 VACCINE BIVALENT BOOSTER 12+ (PFIZER): CPT | Performed by: FAMILY MEDICINE

## 2023-02-09 PROCEDURE — 91312 COVID-19 VACCINE BIVALENT BOOSTER 12+ (PFIZER): CPT | Performed by: FAMILY MEDICINE

## 2023-02-09 PROCEDURE — 99214 OFFICE O/P EST MOD 30 MIN: CPT | Mod: 25 | Performed by: FAMILY MEDICINE

## 2023-02-09 RX ORDER — BETAMETHASONE DIPROPIONATE 0.5 MG/G
LOTION TOPICAL 2 TIMES DAILY
Qty: 60 ML | Refills: 3 | Status: SHIPPED | OUTPATIENT
Start: 2023-02-09 | End: 2024-05-13

## 2023-02-09 RX ORDER — SILDENAFIL 100 MG/1
100 TABLET, FILM COATED ORAL DAILY PRN
Qty: 30 TABLET | Refills: 3 | Status: SHIPPED | OUTPATIENT
Start: 2023-02-09

## 2023-02-09 RX ORDER — AMLODIPINE BESYLATE 5 MG/1
5 TABLET ORAL DAILY
Qty: 30 TABLET | Refills: 3 | Status: SHIPPED | OUTPATIENT
Start: 2023-02-09 | End: 2023-06-16

## 2023-02-09 RX ORDER — POLYETHYLENE GLYCOL 3350 17 G/17G
1 POWDER, FOR SOLUTION ORAL DAILY PRN
Qty: 510 G | Refills: 3 | Status: SHIPPED | OUTPATIENT
Start: 2023-02-09

## 2023-02-09 RX ORDER — METRONIDAZOLE 7.5 MG/G
GEL TOPICAL 2 TIMES DAILY
Qty: 135 G | Refills: 3 | Status: SHIPPED | OUTPATIENT
Start: 2023-02-09 | End: 2023-11-17

## 2023-02-09 RX ORDER — CALCIUM POLYCARBOPHIL 625 MG 625 MG/1
1 TABLET ORAL DAILY
Qty: 90 TABLET | Refills: 3 | Status: SHIPPED | OUTPATIENT
Start: 2023-02-09

## 2023-02-09 RX ORDER — BENZOYL PEROXIDE 5 G/100G
GEL TOPICAL DAILY
Qty: 45 G | Refills: 3 | Status: SHIPPED | OUTPATIENT
Start: 2023-02-09

## 2023-02-09 RX ORDER — ATORVASTATIN CALCIUM 40 MG/1
TABLET, FILM COATED ORAL
Qty: 90 TABLET | Refills: 3 | Status: SHIPPED | OUTPATIENT
Start: 2023-02-09 | End: 2023-11-30

## 2023-02-09 RX ORDER — GLIPIZIDE 10 MG/1
20 TABLET, FILM COATED, EXTENDED RELEASE ORAL DAILY
Qty: 180 TABLET | Refills: 3 | Status: SHIPPED | OUTPATIENT
Start: 2023-02-09 | End: 2024-04-30

## 2023-02-09 RX ORDER — LISINOPRIL 40 MG/1
40 TABLET ORAL DAILY
Qty: 90 TABLET | Refills: 3 | Status: SHIPPED | OUTPATIENT
Start: 2023-02-09 | End: 2024-02-20

## 2023-02-09 NOTE — PROGRESS NOTES
Dennis was seen today for recheck, hypertension, medication reconciliation and imm/inj.    Diagnoses and all orders for this visit:    Type 2 diabetes mellitus with microalbuminuria, without long-term current use of insulin (H)  -     HEMOGLOBIN A1C; Future  -     Lipid panel reflex to direct LDL Non-fasting; Future  -     atorvastatin (LIPITOR) 40 MG tablet; Take 1 tab daily  -     glipiZIDE (GLUCOTROL XL) 10 MG 24 hr tablet; Take 2 tablets (20 mg) by mouth daily  -     metFORMIN (GLUCOPHAGE) 500 MG tablet; TAKE TWO TABLETS BY MOUTH TWICE A DAY WITH MEALS  -     blood glucose (NO BRAND SPECIFIED) lancets standard; Use to test blood sugar 1 time daily  -     blood glucose (NO BRAND SPECIFIED) test strip; Use to test blood sugar 1 time daily    CKD stage G3a/A3, GFR 45-59 and albumin creatinine ratio >300 mg/g (H)  -     BASIC METABOLIC PANEL; Future  -     Albumin Random Urine Quantitative with Creat Ratio; Future    Screening for hyperlipidemia  -     Lipid panel reflex to direct LDL Non-fasting; Future    High priority for 2019-nCoV vaccine    Rosacea  -     benzoyl peroxide 5 % topical gel; Apply topically daily  -     metroNIDAZOLE (METROGEL) 0.75 % external gel; Apply topically 2 times daily    Chronic idiopathic constipation  -     calcium polycarbophil (FIBERCON) 625 MG tablet; Take 1 tablet (625 mg) by mouth daily  -     polyethylene glycol (MIRALAX) 17 GM/Dose powder; Take 17 g (1 capful) by mouth daily as needed for constipation    Erectile dysfunction, unspecified erectile dysfunction type  -     sildenafil (VIAGRA) 100 MG tablet; Take 1 tablet (100 mg) by mouth daily as needed (erectile dysfunction)  -     Testosterone Free and Total; Future    Benign essential hypertension  -     amLODIPine (NORVASC) 5 MG tablet; Take 1 tablet (5 mg) by mouth daily  -     lisinopril (ZESTRIL) 40 MG tablet; Take 1 tablet (40 mg) by mouth daily    Preventative health care  -     aspirin (ASA) 81 MG EC tablet; Take 1  tablet (81 mg) by mouth daily    Atopic dermatitis, unspecified type    Nummular eczema    Other orders  -     COVID-19,PF,PFIZER BOOSTER BIVALENT 12+Yrs  -     betamethasone dipropionate (DIPROSONE) 0.05 % external lotion; Apply topically 2 times daily To back and scalp PRN itching      I went through all of his medications with him, which he knows by name.  Given his elevated BP today, we discussed increasing his amlodipine dose but he does not want to.  I recommended that he use the upper arm cuff at home suggesting that this is likely more accurate than either a wrist or forearm BP measurement.    Concerning his erectile dysfunction I suggested increasing the dose of sildenafil and gave him a larger quantity.  I also recommended that we check testosterone levels and a PSA.    Concerning his DM2 and CKD, I recommended rechecking those labs.  I would prefer if he would take a more contemporary medication regimen for diabetes but he is paying out of pocket and this is a significant limiting factor.  He never heard about a referral to nephrology last year and I have advised him that if his CKD is in the same range, I will place another referral this year.    For his eczema, I have suggested he use a hyperal allergenic, non-scented soap and shampoo such as produced by [employer].  I have also refilled to prozone which is a more potent steroid than what he was given a few weeks ago since he is describing little improvement with the lower potency topical steroid.    He agrees to have recommended immunizations today.  I have encouraged him to come back for a focused diabetes and CKD visit in 6 months and if he has other concerns to schedule another separate visit for those.    Total visit time with patient was 25 mins, all of which was face to face MD time, and over 50% of this time was spent in counseling and coordination of care.  Including post-encounter documentation and orders, total encounter time was 32  "mins.      Subjective:  This is a 77-year-old who is well-known to me.  He comes in today with multiple concerns, more than can be adequately addressed in a single 20-minute encounter.  He was just here a few weeks ago with dermatitis and reports that he continues to have patches of itchy skin.  Especially on his back and in the lower back region it is very difficult for him to put on lotion.  He also has some itching of his scalp which he shaves.  He has had a longstanding history of facial rosacea and has run out of some of his lotions for that rash and requests refills of those also.  He is not sure what is a good soap and shampoo to use and requests advice on this point.    Concerning his diabetes he reports that he is still using all his old medications.  When he attended pharmacy, he was told that empagliflozin would cost him over $400 which he cannot afford.  He therefore stuck with the glipizide dosing that he has used for many years.    ROS:  : He continues to note erectile dysfunction.  He says that this is most noticeable during the daytime and that he seems to perform better at nighttime.  He did purchase sildenafil and has been taking half tab of 100 mg prior to intercourse but says it has not made any difference.  During the daytime, he says that he has considerable difficulty achieving an erection and that this will not maintain for very long.  GI: He reports that despite taking daily fiber supplements, he continues to be constipated.  He does drink water regularly.    Objective:  BP (!) 161/80 (BP Location: Left arm, Patient Position: Sitting, Cuff Size: Adult Regular)   Pulse 74   Temp 97.5  F (36.4  C) (Oral)   Resp 12   Ht 1.727 m (5' 8\")   Wt 70.2 kg (154 lb 12.8 oz)   SpO2 99%   BMI 23.54 kg/m    His systolic BP is elevated.  He reports that he gets very different readings on his home cuff.  He actually has several different cuffs including an upper arm and forearm and wrist cuff.  He " asks which one is the most accurate? He questions the accuracy of our clinic BP measurements since previously while very elevated in the clinic, he checked soon after and pharmacy and it was normal.  I examined his skin.  He continues to have facial rosacea.  He has patches of flaky reddened skin especially on his back and also on his shaved scalp.  These appear to be consistent with nummular eczema.

## 2023-02-23 ENCOUNTER — TELEPHONE (OUTPATIENT)
Dept: FAMILY MEDICINE | Facility: CLINIC | Age: 78
End: 2023-02-23
Payer: MEDICARE

## 2023-02-23 DIAGNOSIS — R80.9 TYPE 2 DIABETES MELLITUS WITH MICROALBUMINURIA, WITHOUT LONG-TERM CURRENT USE OF INSULIN (H): ICD-10-CM

## 2023-02-23 DIAGNOSIS — E11.29 TYPE 2 DIABETES MELLITUS WITH MICROALBUMINURIA, WITHOUT LONG-TERM CURRENT USE OF INSULIN (H): ICD-10-CM

## 2023-02-23 NOTE — TELEPHONE ENCOUNTER
[facility] phone call message- general phone call:    Reason for call: Pt had a prescription written for metFORMIN (GLUCOPHAGE) 500 MG tablet -TAKE TWO TABLETS BY MOUTH TWICE A DAY WITH MEALS.  The problem is that it was written for 120 tablets.  That is only enough medication for one month.  The patient needs it written for three months at a time for cost savings.[facility] [place] [place] [facility] [address]     Return call needed: Yes    OK to leave a message on voice mail? Yes    Primary language: English      needed? No    Call taken on February 23, 2023 at 10:20 AM by Padilla Kate

## 2023-04-16 ENCOUNTER — HEALTH MAINTENANCE LETTER (OUTPATIENT)
Age: 78
End: 2023-04-16

## 2023-06-02 ENCOUNTER — HEALTH MAINTENANCE LETTER (OUTPATIENT)
Age: 78
End: 2023-06-02

## 2023-06-05 ENCOUNTER — TRANSFERRED RECORDS (OUTPATIENT)
Dept: HEALTH INFORMATION MANAGEMENT | Facility: CLINIC | Age: 78
End: 2023-06-05

## 2023-06-16 DIAGNOSIS — I10 BENIGN ESSENTIAL HYPERTENSION: ICD-10-CM

## 2023-06-16 RX ORDER — AMLODIPINE BESYLATE 5 MG/1
TABLET ORAL
Qty: 30 TABLET | Refills: 11 | Status: SHIPPED | OUTPATIENT
Start: 2023-06-16 | End: 2024-05-14

## 2023-08-23 ENCOUNTER — OFFICE VISIT (OUTPATIENT)
Dept: FAMILY MEDICINE | Facility: CLINIC | Age: 78
End: 2023-08-23
Payer: MEDICARE

## 2023-08-23 VITALS
BODY MASS INDEX: 23.63 KG/M2 | OXYGEN SATURATION: 97 % | DIASTOLIC BLOOD PRESSURE: 77 MMHG | RESPIRATION RATE: 16 BRPM | HEART RATE: 76 BPM | TEMPERATURE: 98.2 F | WEIGHT: 155.4 LBS | SYSTOLIC BLOOD PRESSURE: 153 MMHG

## 2023-08-23 DIAGNOSIS — E11.29 TYPE 2 DIABETES MELLITUS WITH MICROALBUMINURIA, WITHOUT LONG-TERM CURRENT USE OF INSULIN (H): ICD-10-CM

## 2023-08-23 DIAGNOSIS — N18.31 CKD STAGE G3A/A3, GFR 45-59 AND ALBUMIN CREATININE RATIO >300 MG/G (H): ICD-10-CM

## 2023-08-23 DIAGNOSIS — R80.9 TYPE 2 DIABETES MELLITUS WITH MICROALBUMINURIA, WITHOUT LONG-TERM CURRENT USE OF INSULIN (H): ICD-10-CM

## 2023-08-23 DIAGNOSIS — S05.8X2A: Primary | ICD-10-CM

## 2023-08-23 PROCEDURE — 99213 OFFICE O/P EST LOW 20 MIN: CPT | Mod: GC

## 2023-08-23 RX ORDER — DICLOFENAC SODIUM 1 MG/ML
1 SOLUTION/ DROPS OPHTHALMIC 4 TIMES DAILY
Qty: 2.5 ML | Refills: 0 | Status: SHIPPED | OUTPATIENT
Start: 2023-08-23 | End: 2023-09-11

## 2023-08-23 RX ORDER — PROPARACAINE HYDROCHLORIDE 5 MG/ML
1 SOLUTION/ DROPS OPHTHALMIC ONCE
Status: ACTIVE | OUTPATIENT
Start: 2023-08-23

## 2023-08-23 NOTE — LETTER
[facility] [address] [place] [address]  [phone]  Dept: [phone]      8/23/2023    Re: Dennis Blum      TO WHOM IT MAY CONCERN:    Dennis Blum  was seen on 8/23/23.  Please excuse him until 8/24/23 due to injury.  The findings in his eye are not transmissible and should resolve with time and rest.    Cordially          Yamileth Jimenez MD  [facility]

## 2023-08-23 NOTE — PROGRESS NOTES
Chief Complaint   Patient presents with    Other     2 days ago. A bug landed on his left eye and has been red after that. Feels uncomfortable but no pain. Pt can see well        There are no exam notes on file for this visit.        Assessment/Plan:  Dennis Blum is a 78 year old male here for blunt eye injury.    Dennis was seen today for other.    Diagnoses and all orders for this visit:    Blunt injury of eye, left, initial encounter  Patient was riding his car open 2 days ago when a bug hit left eye.  Immediate pain and irritation redness and patient was able to go home and look in the mirror.  No vision alteration, no excessive pain.  Direct exam showed no signs of foreign body, fluorescein exam showed no corneal abrasion.  Recommended patient to use artificial tears and diclofenac eyedrops to manage discomfort.  Advised to follow-up with ophthalmologist immediately if pain worsens, vision worsens, or shape of the eye seems to change in the mirror.  -     proparacaine (ALCAINE) 0.5 % ophthalmic solution 1 drop  -     fluorescein (FUL-JESSICA) ophthalmic strip 1 strip  -     dextran 70-hypromellose (TEARS NATURALE FREE PF) 0.1-0.3 % ophthalmic solution; Place 1 drop Into the left eye 3 times daily as needed (Eye irritation)    Type 2 diabetes mellitus with microalbuminuria, without long-term current use of insulin (H)    CKD stage G3a/A3, GFR 45-59 and albumin creatinine ratio >300 mg/g (H)  -     Hemoglobin; Future        Follow-up with Pavan Christy in 2-3 months for Medicare wellness visit.    No future appointments.    MD Yamileth Shah MD   -Performed collaboratively due to resident learning opportunity.       Patient Instructions   Welcome to return to work.  You basically suffered a large bruise to your eye.  We do not see any sign of damage to the deeper structures of the eye or the outer surface of the eye called the cornea.  Try to avoid rubbing your eyes much as possible.  We  will give you some eyedrops you can use whenever your eye gets irritated.  It will take about 1 to 2 weeks for the blood to fade from your eye.    If at any point time you notice your vision gets blurry, you feel pressure behind your eye, or if you feel the redness is spreading further, please contact your eye doctor right away.    Subjective:  Dennis Blum is a 78 year old male here for driving and a bug landed and hit his eye  Constant tearing.  Tried to wash out but could't get it out.   Feels something under upper lid of eye. Improved after lid exam by Dr. Ramon.  No vision problems.   No sensitivity to light.    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       Patient Active Problem List   Diagnosis    Benign essential hypertension    Type 2 diabetes mellitus with microalbuminuria, without long-term current use of insulin (H)    CKD stage G3a/A3, GFR 45-59 and albumin creatinine ratio >300 mg/g (H)    Dyslipidemia (high LDL; low HDL)    Systolic murmur    Rosacea    Nummular eczema    Erectile dysfunction, unspecified erectile dysfunction type    Chronic idiopathic constipation       Current Outpatient Medications   Medication    amLODIPine (NORVASC) 5 MG tablet    atorvastatin (LIPITOR) 40 MG tablet    blood glucose (NO BRAND SPECIFIED) lancets standard    blood glucose (NO BRAND SPECIFIED) test strip    blood glucose monitoring (NO BRAND SPECIFIED) meter device kit    dextran 70-hypromellose (TEARS NATURALE FREE PF) 0.1-0.3 % ophthalmic solution    glipiZIDE (GLUCOTROL XL) 10 MG 24 hr tablet    lisinopril (ZESTRIL) 40 MG tablet    metFORMIN (GLUCOPHAGE) 500 MG tablet    aspirin (ASA) 81 MG EC tablet    benzoyl peroxide 5 % topical gel    betamethasone dipropionate (DIPROSONE) 0.05 % external lotion    calcium polycarbophil (FIBERCON) 625 MG tablet    metroNIDAZOLE (METROGEL) 0.75 % external gel    polyethylene glycol  (MIRALAX) 17 GM/Dose powder    sildenafil (VIAGRA) 100 MG tablet     Current Facility-Administered Medications   Medication    fluorescein (FUL-JESSICA) ophthalmic strip 1 strip    proparacaine (ALCAINE) 0.5 % ophthalmic solution 1 drop       Objective:  BP (!) 153/77 (BP Location: Left arm, Patient Position: Sitting, Cuff Size: Adult Regular)   Pulse 76   Temp 98.2  F (36.8  C) (Oral)   Resp 16   Wt 70.5 kg (155 lb 6.4 oz)   SpO2 97%   BMI 23.63 kg/m    Body mass index is 23.63 kg/m .  Gen: A/O x3, in NAD  HEENT: normal ocular muscular movement and reaction to light and accomodation, normal vision of L and R eye, L eye direct ophthalmoscopic exam showed no foreign body, no distortion of globe, lens, or iris, extensive erythema of lower and lateral sclera, fluorescein exam showed no abrasion of cornea   Cardio: S1, S2, no MRG. RRR.  Resp: CTAB, no WRR.  Abd: Soft, NT/ND, no rebound or guarding.  NABS.  Ext: No edema of BLE. Cap refill <2 seconds.  Neuro: Grossly intact.

## 2023-08-23 NOTE — PATIENT INSTRUCTIONS
Welcome to return to work.  You basically suffered a large bruise to your eye.  We do not see any sign of damage to the deeper structures of the eye or the outer surface of the eye called the cornea.  Try to avoid rubbing your eyes much as possible.  We will give you some eyedrops you can use whenever your eye gets irritated.  It will take about 1 to 2 weeks for the blood to fade from your eye.    If at any point time you notice your vision gets blurry, you feel pressure behind your eye, or if you feel the redness is spreading further, please contact your eye doctor right away.

## 2023-08-24 NOTE — PROGRESS NOTES
Preceptor Attestation:    I discussed the patient with the resident and evaluated the patient in person. I have verified the content of the note, which accurately reflects my assessment of the patient and the plan of care.   Supervising Physician:  Simón Garg MD.

## 2023-09-11 ENCOUNTER — TRANSFERRED RECORDS (OUTPATIENT)
Dept: HEALTH INFORMATION MANAGEMENT | Facility: CLINIC | Age: 78
End: 2023-09-11

## 2023-09-11 DIAGNOSIS — S05.8X2A: ICD-10-CM

## 2023-09-11 RX ORDER — DICLOFENAC SODIUM 1 MG/ML
SOLUTION/ DROPS OPHTHALMIC
Qty: 5 ML | Refills: 0 | Status: SHIPPED | OUTPATIENT
Start: 2023-09-11

## 2023-09-13 ENCOUNTER — TELEPHONE (OUTPATIENT)
Dept: FAMILY MEDICINE | Facility: CLINIC | Age: 78
End: 2023-09-13

## 2023-09-13 DIAGNOSIS — S05.8X2A: ICD-10-CM

## 2023-09-13 NOTE — TELEPHONE ENCOUNTER
[facility] phone call message- medication clarification/question:    Full Medication Name: diclofenac sodium   Dose: 0.1%     Question: it only comes in a 0.5% can the Dr change it      Pharmacy confirmed as    [id number] - [place], [place] - [address] AT [facility] - [place], [place] - [address] [facility]  [id number] - [place], [place] - [address] [facility] - [address]: Yes    OK to leave a message on voice mail? Yes    Primary language: English      needed? No    Call taken on September 13, 2023 at 2:00 PM by Pavan Houston

## 2023-09-18 NOTE — TELEPHONE ENCOUNTER
Called patient, no answer, left message to call clinic to clarify med question. Odette De La Cruz, CMA

## 2023-11-17 ENCOUNTER — OFFICE VISIT (OUTPATIENT)
Dept: FAMILY MEDICINE | Facility: CLINIC | Age: 78
End: 2023-11-17
Payer: MEDICARE

## 2023-11-17 VITALS
BODY MASS INDEX: 25.68 KG/M2 | OXYGEN SATURATION: 100 % | SYSTOLIC BLOOD PRESSURE: 148 MMHG | TEMPERATURE: 98.2 F | WEIGHT: 159.8 LBS | DIASTOLIC BLOOD PRESSURE: 79 MMHG | HEIGHT: 66 IN | RESPIRATION RATE: 20 BRPM | HEART RATE: 74 BPM

## 2023-11-17 DIAGNOSIS — E11.29 TYPE 2 DIABETES MELLITUS WITH MICROALBUMINURIA, WITHOUT LONG-TERM CURRENT USE OF INSULIN (H): ICD-10-CM

## 2023-11-17 DIAGNOSIS — L71.9 ROSACEA: Primary | ICD-10-CM

## 2023-11-17 DIAGNOSIS — Z85.828 HISTORY OF BASAL CELL CARCINOMA: ICD-10-CM

## 2023-11-17 DIAGNOSIS — R80.9 TYPE 2 DIABETES MELLITUS WITH MICROALBUMINURIA, WITHOUT LONG-TERM CURRENT USE OF INSULIN (H): ICD-10-CM

## 2023-11-17 DIAGNOSIS — Z23 ENCOUNTER FOR VACCINATION: ICD-10-CM

## 2023-11-17 DIAGNOSIS — T78.40XA ALLERGY, INITIAL ENCOUNTER: ICD-10-CM

## 2023-11-17 LAB
ANION GAP SERPL CALCULATED.3IONS-SCNC: 10 MMOL/L (ref 7–15)
BUN SERPL-MCNC: 24.5 MG/DL (ref 8–23)
CALCIUM SERPL-MCNC: 8.5 MG/DL (ref 8.8–10.2)
CHLORIDE SERPL-SCNC: 104 MMOL/L (ref 98–107)
CHOLEST SERPL-MCNC: 223 MG/DL
CREAT SERPL-MCNC: 1.63 MG/DL (ref 0.67–1.17)
CREAT UR-MCNC: 67.2 MG/DL
DEPRECATED HCO3 PLAS-SCNC: 25 MMOL/L (ref 22–29)
EGFRCR SERPLBLD CKD-EPI 2021: 43 ML/MIN/1.73M2
GLUCOSE SERPL-MCNC: 168 MG/DL (ref 70–99)
HBA1C MFR BLD: 8.4 % (ref 0–5.6)
HDLC SERPL-MCNC: 41 MG/DL
LDLC SERPL CALC-MCNC: 159 MG/DL
MICROALBUMIN UR-MCNC: 407 MG/L
MICROALBUMIN/CREAT UR: 605.65 MG/G CR (ref 0–17)
NONHDLC SERPL-MCNC: 182 MG/DL
POTASSIUM SERPL-SCNC: 5.3 MMOL/L (ref 3.4–5.3)
SODIUM SERPL-SCNC: 139 MMOL/L (ref 135–145)
TRIGL SERPL-MCNC: 113 MG/DL

## 2023-11-17 PROCEDURE — 82043 UR ALBUMIN QUANTITATIVE: CPT | Performed by: FAMILY MEDICINE

## 2023-11-17 PROCEDURE — 82570 ASSAY OF URINE CREATININE: CPT | Performed by: FAMILY MEDICINE

## 2023-11-17 PROCEDURE — 99214 OFFICE O/P EST MOD 30 MIN: CPT | Mod: 25 | Performed by: FAMILY MEDICINE

## 2023-11-17 PROCEDURE — 83036 HEMOGLOBIN GLYCOSYLATED A1C: CPT | Performed by: FAMILY MEDICINE

## 2023-11-17 PROCEDURE — 90480 ADMN SARSCOV2 VAC 1/ONLY CMP: CPT | Performed by: FAMILY MEDICINE

## 2023-11-17 PROCEDURE — 80061 LIPID PANEL: CPT | Performed by: FAMILY MEDICINE

## 2023-11-17 PROCEDURE — 36415 COLL VENOUS BLD VENIPUNCTURE: CPT | Performed by: FAMILY MEDICINE

## 2023-11-17 PROCEDURE — 80048 BASIC METABOLIC PNL TOTAL CA: CPT | Performed by: FAMILY MEDICINE

## 2023-11-17 PROCEDURE — 91320 SARSCV2 VAC 30MCG TRS-SUC IM: CPT | Performed by: FAMILY MEDICINE

## 2023-11-17 RX ORDER — METRONIDAZOLE 7.5 MG/G
GEL TOPICAL 2 TIMES DAILY
Qty: 135 G | Refills: 3 | Status: SHIPPED | OUTPATIENT
Start: 2023-11-17

## 2023-11-17 RX ORDER — FEXOFENADINE HCL 180 MG/1
180 TABLET ORAL DAILY
Qty: 30 TABLET | Refills: 3 | Status: SHIPPED | OUTPATIENT
Start: 2023-11-17 | End: 2024-05-13

## 2023-11-17 RX ORDER — RESPIRATORY SYNCYTIAL VIRUS VACCINE 120MCG/0.5
0.5 KIT INTRAMUSCULAR ONCE
Qty: 1 EACH | Refills: 0 | Status: CANCELLED | OUTPATIENT
Start: 2023-11-17 | End: 2023-11-17

## 2023-11-17 NOTE — PATIENT INSTRUCTIONS
"Patient Education   Rosacea: Care Instructions  Your Care Instructions  Rosacea (say \"Terence\") is a skin condition that can cause redness, pimples, and red lines on the nose, cheeks, chin, and forehead. It is often mistaken for acne because it can cause outbreaks with bumps like pimples. Rosacea can also cause burning and soreness in your eyes.  Rosacea is usually controlled by using medicine and avoiding alcohol, the sun, and other things that can make rosacea worse.  Your doctor may have prescribed medicines or other treatment. If antibiotics do not control the rosacea, your doctor may try other medicines.  Follow-up care is a key part of your treatment and safety. Be sure to make and go to all appointments, and call your doctor if you are having problems. It's also a good idea to know your test results and keep a list of the medicines you take.  How can you care for yourself at home?  Take your medicines exactly as prescribed. Call your doctor if you think you are having a problem with your medicine.  Protect your face from the sun by wearing hats with wide brims and sunglasses. Try to stay out of the sun or find shade if you need to be outdoors. Use a sunscreen for sensitive skin with an SPF of 30 or higher on any exposed skin.  Use soaps, lotions, and makeup made for sensitive skin or rosacea. These do not contain alcohol, are not abrasive, and will not clog pores.  There are over-the-counter skin care products available that are specifically for people with rosacea. These products can help mask facial redness without irritating your skin.  Avoid rubbing or scrubbing your face.  If you have rosacea on your eyelids, put a warm, wet towel, or compress, on your eyes several times a day. Gently wash your eyelids with a washcloth or an eyelid cleanser that is sold in [employer]. Use artificial tears if your eyes feel dry.  Make a list or keep a diary of things that may trigger your rosacea. Use the diary every " "day for several weeks. Avoid whatever you find that makes your rosacea worse. These triggers may include:  Harsh weather. Wear a hat and scarf to shield your face from the cold and wind. Use a moisturizer during the winter to keep your face moist.  Stress. Eat a healthy diet and get plenty of exercise and sleep.  Alcohol, spicy foods, or hot drinks. Avoid or limit these if they make your rosacea worse.  Getting too hot when you exercise. Try working out for a shorter time. In the summer, exercise during the cool morning hours.  Hot showers. Take warm or cool showers and avoid hot tubs and saunas.  When should you call for help?  Watch closely for changes in your health, and be sure to contact your doctor if:    You do not get better as expected.   Where can you learn more?  Go to https://www.[employer].net/patiented  Enter Z077 in the search box to learn more about \"Rosacea: Care Instructions.\"  Current as of: March 21, 2023               Content Version: 13.8    5912-9793 [employer].   Care instructions adapted under license by your healthcare professional. If you have questions about a medical condition or this instruction, always ask your healthcare professional. [employer] disclaims any warranty or liability for your use of this information.         "

## 2023-11-17 NOTE — PROGRESS NOTES
Faxed JOANNA to Stephen Garcia @ [phone] @ [facility] pm.    Jhon De La Cruz MA

## 2023-11-17 NOTE — LETTER
December 11, 2023      Dennis DODD Clark Kulwant  [address] [place] [address]        Dear Mr.Salas Blum,    We are writing to inform you of your test results.    You have chronic kidney disease which is slowly getting worse - we need to keep a close eye on this with labs about three times per year.  OK?  The most common causes of kidney disease are DIABETES and HIGH BLOOD PRESSURE - and you have both.  Your A1c could be lower - can you work to get that lower?  We can add in other medications - there are a lot of new options for diabetes - please schedule a follow up with me if you would like to explore those.      Also your bad cholesterol LDL is quite high - did you miss taking Atorvastatin?  I have just refilled it to [facility] in the case you were out of it.  OK?  Kidney disease is preventable - and I'm very happy to work with you to prevent this as best we can.  Take care!     Resulted Orders   Hemoglobin A1c   Result Value Ref Range    Hemoglobin A1C 8.4 (H) 0.0 - 5.6 %      Comment:      Normal <5.7%   Prediabetes 5.7-6.4%    Diabetes 6.5% or higher     Note: Adopted from ADA consensus guidelines.   Basic metabolic panel   Result Value Ref Range    Sodium 139 135 - 145 mmol/L      Comment:      Reference intervals for this test were updated on 09/26/2023 to more accurately reflect our healthy population. There may be differences in the flagging of prior results with similar values performed with this method. Interpretation of those prior results can be made in the context of the updated reference intervals.     Potassium 5.3 3.4 - 5.3 mmol/L    Chloride 104 98 - 107 mmol/L    Carbon Dioxide (CO2) 25 22 - 29 mmol/L    Anion Gap 10 7 - 15 mmol/L    Urea Nitrogen 24.5 (H) 8.0 - 23.0 mg/dL    Creatinine 1.63 (H) 0.67 - 1.17 mg/dL    GFR Estimate 43 (L) >60 mL/min/1.73m2    Calcium 8.5 (L) 8.8 - 10.2 mg/dL    Glucose 168 (H) 70 - 99 mg/dL   Lipid Profile   Result Value Ref Range    Cholesterol 223 (H) <200  mg/dL    Triglycerides 113 <150 mg/dL    Direct Measure HDL 41 >=40 mg/dL    LDL Cholesterol Calculated 159 (H) <=100 mg/dL    Non HDL Cholesterol 182 (H) <130 mg/dL    Narrative    Cholesterol  Desirable:  <200 mg/dL    Triglycerides  Normal:  Less than 150 mg/dL  Borderline High:  150-199 mg/dL  High:  200-499 mg/dL  Very High:  Greater than or equal to 500 mg/dL    Direct Measure HDL  Female:  Greater than or equal to 50 mg/dL   Male:  Greater than or equal to 40 mg/dL    LDL Cholesterol  Desirable:  <100mg/dL  Above Desirable:  100-129 mg/dL   Borderline High:  130-159 mg/dL   High:  160-189 mg/dL   Very High:  >= 190 mg/dL    Non HDL Cholesterol  Desirable:  130 mg/dL  Above Desirable:  130-159 mg/dL  Borderline High:  160-189 mg/dL  High:  190-219 mg/dL  Very High:  Greater than or equal to 220 mg/dL   Albumin Random Urine Quantitative with Creat Ratio   Result Value Ref Range    Creatinine Urine mg/dL 67.2 mg/dL      Comment:      The reference ranges have not been established in urine creatinine. The results should be integrated into the clinical context for interpretation.    Albumin Urine mg/L 407.0 mg/L      Comment:      The reference ranges have not been established in urine albumin. The results should be integrated into the clinical context for interpretation.    Albumin Urine mg/g Cr 605.65 (H) 0.00 - 17.00 mg/g Cr      Comment:      Microalbuminuria is defined as an albumin:creatinine ratio of 17 to 299 for males and 25 to 299 for females. A ratio of albumin:creatinine of 300 or higher is indicative of overt proteinuria.  Due to biologic variability, positive results should be confirmed by a second, first-morning random or 24-hour timed urine specimen. If there is discrepancy, a third specimen is recommended. When 2 out of 3 results are in the microalbuminuria range, this is evidence for incipient nephropathy and warrants increased efforts at glucose control, blood pressure control, and institution  of therapy with an angiotensin-converting-enzyme (ACE) inhibitor (if the patient can tolerate it).         If you have any questions or concerns, please call the clinic at the number listed above.       Sincerely,      Pavan Christy MD

## 2023-11-20 NOTE — RESULT ENCOUNTER NOTE
Checo Collins - can you please respond when you read this so I know you read it?  Thanks!    You have chronic kidney disease which is slowly getting worse - we need to keep a close eye on this with labs about three times per year.  OK?  The most common causes of kidney disease are DIABETES and HIGH BLOOD PRESSURE - and you have both.  Your A1c could be lower - can you work to get that lower?  We can add in other medications - there are a lot of new options for diabetes - please schedule a follow up with me if you would like to explore those.      Also your bad cholesterol LDL is quite high - did you miss taking Atorvastatin?  I have just refilled it to [facility] in the case you were out of it.  OK?  Kidney disease is preventable - and I'm very happy to work with you to prevent this as best we can.  Take care, Dr Pavan Christy

## 2023-11-22 PROBLEM — Z85.828 HISTORY OF BASAL CELL CARCINOMA: Status: ACTIVE | Noted: 2023-11-22

## 2023-11-22 NOTE — PROGRESS NOTES
Dennis was seen today for throat problem.    Diagnoses and all orders for this visit:    Rosacea  -     metroNIDAZOLE (METROGEL) 0.75 % external gel; Apply topically 2 times daily    Allergy, initial encounter  -     fexofenadine (ALLEGRA) 180 MG tablet; Take 1 tablet (180 mg) by mouth daily    Type 2 diabetes mellitus with microalbuminuria, without long-term current use of insulin (H)  -     Hemoglobin A1c; Future  -     Basic metabolic panel; Future  -     Lipid Profile; Future  -     Albumin Random Urine Quantitative with Creat Ratio; Future  -     Hemoglobin A1c  -     Basic metabolic panel  -     Lipid Profile  -     Albumin Random Urine Quantitative with Creat Ratio    Encounter for vaccination  -     COVID-19 12+ (2023-24) (PFIZER)    History of basal cell carcinoma      At the time of dictation, his A1c has come back above goal.  In addition his kidney function has been progressively slowly deteriorating such that he is now in CKD 3B with kay proteinuria.  His systolic BP is elevated and I have written him to advise him that he needs to better control his diabetes and hypertension to slow the progression of his chronic kidney disease.  At next visit with him I will plan to refer him to nephrology if his indices are not improved.    In addition his LDL is elevated as if he is not taking atorvastatin.  I will remind him that he should be taking this.  Based on today's lipid panel, he is at extremely high risk of future acute coronary syndrome.    In addition I did receive communications from his dermatologist which indicate that he had a basal cell carcinoma excised from his face in 2019.    I will reach out to him to discuss augmenting his diabetic regimen to better control his blood sugars and make sure he is taking his currently prescribed medications.    Total visit time with patient was 25 mins, all of which was face to face MD time, and over 50% of this time was spent in counseling and coordination of  "care.  Including post-encounter documentation and orders on the date of service, total encounter time was 32 mins.    The 10-year ASCVD risk score (Marina ALVES, et al., 2019) is: 62.8%    Values used to calculate the score:      Age: 78 years      Sex: Male      Is Non- : No      Diabetic: Yes      Tobacco smoker: No      Systolic Blood Pressure: 148 mmHg      Is BP treated: Yes      HDL Cholesterol: 41 mg/dL      Total Cholesterol: 223 mg/dL      Subjective:  This is a 78-year-old who is well-known to me.  He attends today primarily concerned about acute symptoms over the last 4 days where his forehead and the skin between his eyebrows is itchy and is not relieved by the topical steroid given him by his dermatologist.  In addition his eyes are itchy and he believes he is having allergic symptoms.  He has some nasal itching and sneezing.  He cannot say what exactly would have provoked this.    He reports that he attends a dermatology clinic however review of his records reveals that we have never received any communications from them.  He agrees to sign a release of information to get copies of his records.  He says he has had a number of biopsies and thinks some of these may have been cancerous.    He was given a potent steroid to apply to dermatitis on his scalp although says that this is not helping and finds some patches are very itchy.  He has a diagnosis of rosacea and previously used metronidazole gel although says he has not used this for some time.    Otherwise he has type 2 diabetes and believes that this is reasonably well controlled.  He does not attend as regularly as he should and is behind on lab testing and agrees to have this performed today.    Objective:  BP (!) 148/79   Pulse 74   Temp 98.2  F (36.8  C) (Oral)   Resp 20   Ht 1.679 m (5' 6.1\")   Wt 72.5 kg (159 lb 12.8 oz)   SpO2 100%   BMI 25.71 kg/m    His systolic BP is just above goal which she says occurs when he " is in clinic and that is usually lower when he is at home.  His BMI is good.  Exam of his scalp does not reveal any particular dermatitis nor consistent features of any infection.  He does have reddened dry crusty flaky skin between his eyebrows and along his eyebrows bilaterally extending over his brow of the nose into his cheeks.  Clinically this is very typical for rosacea.  His eyes are examined and there is no conjunctival injection.  He does have some nasal congestion.    I reviewed his medications and we will follow-up with him once his lab results become available.    Results for orders placed or performed in visit on 11/17/23   Hemoglobin A1c     Status: Abnormal   Result Value Ref Range    Hemoglobin A1C 8.4 (H) 0.0 - 5.6 %   Basic metabolic panel     Status: Abnormal   Result Value Ref Range    Sodium 139 135 - 145 mmol/L    Potassium 5.3 3.4 - 5.3 mmol/L    Chloride 104 98 - 107 mmol/L    Carbon Dioxide (CO2) 25 22 - 29 mmol/L    Anion Gap 10 7 - 15 mmol/L    Urea Nitrogen 24.5 (H) 8.0 - 23.0 mg/dL    Creatinine 1.63 (H) 0.67 - 1.17 mg/dL    GFR Estimate 43 (L) >60 mL/min/1.73m2    Calcium 8.5 (L) 8.8 - 10.2 mg/dL    Glucose 168 (H) 70 - 99 mg/dL   Lipid Profile     Status: Abnormal   Result Value Ref Range    Cholesterol 223 (H) <200 mg/dL    Triglycerides 113 <150 mg/dL    Direct Measure HDL 41 >=40 mg/dL    LDL Cholesterol Calculated 159 (H) <=100 mg/dL    Non HDL Cholesterol 182 (H) <130 mg/dL    Narrative    Cholesterol  Desirable:  <200 mg/dL    Triglycerides  Normal:  Less than 150 mg/dL  Borderline High:  150-199 mg/dL  High:  200-499 mg/dL  Very High:  Greater than or equal to 500 mg/dL    Direct Measure HDL  Female:  Greater than or equal to 50 mg/dL   Male:  Greater than or equal to 40 mg/dL    LDL Cholesterol  Desirable:  <100mg/dL  Above Desirable:  100-129 mg/dL   Borderline High:  130-159 mg/dL   High:  160-189 mg/dL   Very High:  >= 190 mg/dL    Non HDL Cholesterol  Desirable:  130  mg/dL  Above Desirable:  130-159 mg/dL  Borderline High:  160-189 mg/dL  High:  190-219 mg/dL  Very High:  Greater than or equal to 220 mg/dL   Albumin Random Urine Quantitative with Creat Ratio     Status: Abnormal   Result Value Ref Range    Creatinine Urine mg/dL 67.2 mg/dL    Albumin Urine mg/L 407.0 mg/L    Albumin Urine mg/g Cr 605.65 (H) 0.00 - 17.00 mg/g Cr

## 2023-11-28 ENCOUNTER — TELEPHONE (OUTPATIENT)
Dept: FAMILY MEDICINE | Facility: CLINIC | Age: 78
End: 2023-11-28
Payer: MEDICARE

## 2023-11-28 NOTE — TELEPHONE ENCOUNTER
I called patient since it looks like he has not used Mychart in over 2 years.  I expressed concern about his A1c, lipids and worsening kidney function.  Need to better control sugars and lipids to prevent worsening CKD.  Suggested he check email - and if he prefers can switch off Mychart.  Asked him to call clinic to review labs with RN or, if necessary, with me.  Can also schedule a phone encounter or in person to follow up, Pavan Christy

## 2023-11-30 ENCOUNTER — TELEPHONE (OUTPATIENT)
Dept: FAMILY MEDICINE | Facility: CLINIC | Age: 78
End: 2023-11-30
Payer: MEDICARE

## 2023-11-30 DIAGNOSIS — E11.29 TYPE 2 DIABETES MELLITUS WITH MICROALBUMINURIA, WITHOUT LONG-TERM CURRENT USE OF INSULIN (H): ICD-10-CM

## 2023-11-30 DIAGNOSIS — R80.9 TYPE 2 DIABETES MELLITUS WITH MICROALBUMINURIA, WITHOUT LONG-TERM CURRENT USE OF INSULIN (H): ICD-10-CM

## 2023-11-30 RX ORDER — ATORVASTATIN CALCIUM 40 MG/1
TABLET, FILM COATED ORAL
Qty: 90 TABLET | Refills: 1 | Status: SHIPPED | OUTPATIENT
Start: 2023-11-30 | End: 2024-05-14

## 2023-11-30 NOTE — TELEPHONE ENCOUNTER
"Spoke with pt about test results. Relayed Dr Christy's message and assisted pt with scheduling a clinic appt for 12/8/23. Pt states that he is interested in exploring new medications for his diabetes. Pt states that he has not taken Atorvastatin for \"many months\" because the pharmacy told him he had no refills. Informed pt that Dr Christy will be sending a new prescription for Atorvastatin to the pharmacy on file. Pt has no further questions at this time. Routed to Dr Christy.   Lotus RN, BSN    "

## 2023-12-08 ENCOUNTER — VIRTUAL VISIT (OUTPATIENT)
Dept: FAMILY MEDICINE | Facility: CLINIC | Age: 78
End: 2023-12-08
Payer: MEDICARE

## 2023-12-08 DIAGNOSIS — E11.29 TYPE 2 DIABETES MELLITUS WITH MICROALBUMINURIA, WITHOUT LONG-TERM CURRENT USE OF INSULIN (H): Primary | ICD-10-CM

## 2023-12-08 DIAGNOSIS — R80.9 TYPE 2 DIABETES MELLITUS WITH MICROALBUMINURIA, WITHOUT LONG-TERM CURRENT USE OF INSULIN (H): Primary | ICD-10-CM

## 2023-12-08 PROCEDURE — 99442 PR PHYSICIAN TELEPHONE EVALUATION 11-20 MIN: CPT | Mod: 95 | Performed by: FAMILY MEDICINE

## 2023-12-08 NOTE — PROGRESS NOTES
"Dennis is a 78 year old who is being evaluated via a billable telephone visit.      What phone number would you like to be contacted at? [phone]  How would you like to obtain your AVS? CopyRightNowpatito    Distant Location (provider location):  On-site    Assessment & Plan     Dennis was seen today for diabetes and medication reconciliation.    Diagnoses and all orders for this visit:    Type 2 diabetes mellitus with microalbuminuria, without long-term current use of insulin (H)      I have shared with him information about the [email or web address] website and have also sent him a [employer] message with that information.  Once he has signed up I will prescribe bexagliflozin for him and will advise him to discontinue taking glipizide.    Consider starting a new type of diabetes medication called Brenzavvy (bexagliflozin) that is now available online for approximately $48/month or $145 for a 3 month supply.  This medication is being offered at a low cost online pharmacy only.  It is the same type of medication as Januvia or Farxiga (advertised on TV) but these medications are not covered by Medicare and some other insurances and therefore are extremely expensive.  They seem to be very good medications since in addition to lowering your sugar, they also help protect your kidneys and your heart.    20 mg once daily     Available only from https://AMT/    Step 1: Create an account with [facility] to set up profile, mailing address, and payment    *Requires an email   Step 2: Request a prescription from me, Dr. Christy (your email address must be included on prescription - please make sure I know what it is)   Step 3: Medication is mailed to you     BMI:   Estimated body mass index is 25.71 kg/m  as calculated from the following:    Height as of 11/17/23: 1.679 m (5' 6.1\").    Weight as of 11/17/23: 72.5 kg (159 lb 12.8 oz).       Follow up after signing up for reduced price Rx website.    Return in about 3 months " (around 3/8/2024), or if symptoms worsen or fail to improve, for Follow up, with me.    Pavan Christy MD  [facility] KEYLA Collins is a 78 year old, presenting for the following health issues:  Diabetes (Diabetes medication) and Medication Reconciliation (Did not review provider to review with patient)        12/8/2023    12:53 PM   Additional Questions   Roomed by Odette   Accompanied by patient       HPI     This is a 78-year-old well-known to me who scheduled today's encounter to follow-up on recent labs.  He has developed CKD and is in the 3B range.  In addition he has proteinuria.  He is nonobese and has had type 2 diabetes for many years with reasonable control.  He also has hypertension that is not consistently controlled.    We spent a number of minutes discussing the importance of controlling blood sugars and blood pressure to slow the rate of progression of chronic kidney disease.  He understands the concept.    He does not have any insurance coverage for medications and currently pays out-of-pocket for them.  I had previously attempted to prescribe an SGLT2 inhibitor however this would have cost over $400 per month which she could not afford.  Today, again we discussed this and I suggested he sign up for the [facility] website where he can get this medication for less than $50 per month.  He says that this is affordable to him.    In addition he has forgotten his password for [employer] but is computer literate and would like to communicate this way so I therefore helped him change his password today.    In addition he had run out of atorvastatin hence his elevated LDL at last lab.  He is now taking atorvastatin daily again.    He would be interested in learning more about signing up for part D Medicare.  He continues to work despite his age and says that his income is above Medicaid guidelines.        Review of Systems   No other concerns discussed today.       Objective           Vitals:  No vitals were obtained today due to virtual visit.    Physical Exam   alert and no distress  PSYCH: Alert and oriented times 3; coherent speech, normal   rate and volume, able to articulate logical thoughts, able   to abstract reason, no tangential thoughts, no hallucinations   or delusions  His affect is normal and pleasant  RESP: No cough, no audible wheezing, able to talk in full sentences  Remainder of exam unable to be completed due to telephone visits    Lab Results   Component Value Date    A1C 8.4 11/17/2023    A1C 7.7 02/18/2022    A1C 6.8 03/26/2021    A1C 6.8 02/03/2020    A1C 8.7 01/03/2019    A1C 7.3 04/20/2018    A1C 7.8 09/23/2016     Last Comprehensive Metabolic Panel:  Lab Results   Component Value Date     11/17/2023    POTASSIUM 5.3 11/17/2023    CHLORIDE 104 11/17/2023    CO2 25 11/17/2023    ANIONGAP 10 11/17/2023     (H) 11/17/2023    BUN 24.5 (H) 11/17/2023    CR 1.63 (H) 11/17/2023    GFRESTIMATED 43 (L) 11/17/2023    COLEMAN 8.5 (L) 11/17/2023       Lab Results   Component Value Date    MICROL 407.0 11/17/2023             Phone call duration: 16 minutes

## 2023-12-12 ENCOUNTER — TELEPHONE (OUTPATIENT)
Dept: FAMILY MEDICINE | Facility: CLINIC | Age: 78
End: 2023-12-12
Payer: MEDICARE

## 2023-12-12 NOTE — TELEPHONE ENCOUNTER
Spoke with pt about MyChart and Prescription website. Pt states he does have MyChart and writer can see it is active, but pt has not used it since 2021. Pt states it is not his preferred method for getting communications, but will try to use it more if that is how communications are sent. Pt states he tried to set up an account with [facility], but despite following the directions, he gets an error that will not let him into the account. Writer offered to walk pt through each step to see what the error is, but pt states he is working and does not have time. Pt states he does not have specific times during the day where he could take a call from writer to help with this issue. 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 Pt states he made an appt with Dr Christy for Friday 12/15/23 to discuss all of these things. Note routed to Dr Christy.  Lotus RN, BSN

## 2023-12-15 ENCOUNTER — OFFICE VISIT (OUTPATIENT)
Dept: FAMILY MEDICINE | Facility: CLINIC | Age: 78
End: 2023-12-15
Payer: MEDICARE

## 2023-12-15 VITALS
WEIGHT: 159.2 LBS | OXYGEN SATURATION: 98 % | BODY MASS INDEX: 25.62 KG/M2 | SYSTOLIC BLOOD PRESSURE: 149 MMHG | HEART RATE: 66 BPM | DIASTOLIC BLOOD PRESSURE: 88 MMHG | RESPIRATION RATE: 12 BRPM

## 2023-12-15 DIAGNOSIS — L71.9 ROSACEA: ICD-10-CM

## 2023-12-15 DIAGNOSIS — R80.9 TYPE 2 DIABETES MELLITUS WITH MICROALBUMINURIA, WITHOUT LONG-TERM CURRENT USE OF INSULIN (H): Primary | ICD-10-CM

## 2023-12-15 DIAGNOSIS — E11.29 TYPE 2 DIABETES MELLITUS WITH MICROALBUMINURIA, WITHOUT LONG-TERM CURRENT USE OF INSULIN (H): Primary | ICD-10-CM

## 2023-12-15 PROCEDURE — 99214 OFFICE O/P EST MOD 30 MIN: CPT | Performed by: FAMILY MEDICINE

## 2023-12-15 RX ORDER — BEXAGLIFLOZIN 20 MG
20 TABLET ORAL DAILY
Qty: 90 TABLET | Refills: 3 | Status: SHIPPED | OUTPATIENT
Start: 2023-12-15 | End: 2024-04-19

## 2023-12-15 RX ORDER — SALICYLIC ACID 5% / SODIUM SULFACETAMIDE MONOHYDRATE 10% 5; 10 G/100G; G/100G
1 SUSPENSION TOPICAL DAILY
Qty: 120 G | Refills: 11 | Status: SHIPPED | OUTPATIENT
Start: 2023-12-15

## 2023-12-20 NOTE — PROGRESS NOTES
Diagnoses and associated orders for this visit:  Type 2 diabetes mellitus with microalbuminuria, without long-term current use of insulin (H)  -     Bexagliflozin 20 MG TABS; Take 20 mg by mouth daily Email: [email or web address]    Rosacea  -     Salicylic Acid-Sulfacetamide 5-10 % SUSP; Externally apply 1 capful. topically daily      The patient is agreeable to start taking the new SGLT 2 inhibitor once he receives it.  Plan will be to have this replace glipizide.  I have asked him to continue to be in contact with me about his blood sugars so that we avoid hypoglycemia.  I have asked him to return in about 3 months time to follow-up both on his renal function as well as his diabetes control.    Total visit time with patient was 15 mins, all of which was face to face MD time, and over 50% of this time was spent in counseling and coordination of care.  Including post-encounter documentation and orders on the date of service, total encounter time was 18 mins.      Shelli Collins is a 78 year old, presenting for the following health issues:  Medication Problem (Signing up with [facility]. )        12/15/2023     9:10 AM   Additional Questions   Roomed by LETTY Ji MA   Accompanied by Self       HPI   This is a 78-year-old with type 2 diabetes and worsening chronic kidney disease.  He attends today in follow-up to a phone conversation where I have recommended that he start on an SGLT2 inhibitor instead of his longstanding use of a sulfonylurea.  He reports that his finances are a significant barrier to some of the new diabetic medications since he does not have any medication insurance.  He has Medicare but not part D.    However he appreciates the importance of improving his diabetic control to prevent worsening of his kidney disease.  He believes he can afford approximately $45 per month for an SGLT2 inhibitor which is made available through an online pharmacy.  He is willing to sign up for this.    In  addition he has struggled to use MyChart in part due to problems logging in and he is seeking assistance to resolve this today.    He also sees a dermatologist who recently prescribed a facial wash for his rosacea which he cannot afford.  He is asking me to prescribe a similar wash which may be more affordable.    Review of Systems   Derm: His skin is improved since his last visit with me      Objective    BP (!) 149/88   Pulse 66   Resp 12   Wt 72.2 kg (159 lb 3.2 oz)   SpO2 98%   BMI 25.62 kg/m    Body mass index is 25.62 kg/m .  Physical Exam   Inspection of his face reveals that the prior dermatitis around his eyes has improved    We spent the remainder of the encounter assisting him with MyChart access and signing him up with the online pharmacy so that he can receive a new SGLT 2 inhibitor.    Lab Results   Component Value Date    A1C 8.4 11/17/2023    A1C 7.7 02/18/2022    A1C 6.8 03/26/2021    A1C 6.8 02/03/2020    A1C 8.7 01/03/2019    A1C 7.3 04/20/2018    A1C 7.8 09/23/2016     Last Comprehensive Metabolic Panel:  Lab Results   Component Value Date     11/17/2023    POTASSIUM 5.3 11/17/2023    CHLORIDE 104 11/17/2023    CO2 25 11/17/2023    ANIONGAP 10 11/17/2023     (H) 11/17/2023    BUN 24.5 (H) 11/17/2023    CR 1.63 (H) 11/17/2023    GFRESTIMATED 43 (L) 11/17/2023    COLEMAN 8.5 (L) 11/17/2023       Lab Results   Component Value Date    MICROL 407.0 11/17/2023

## 2023-12-28 ENCOUNTER — TELEPHONE (OUTPATIENT)
Dept: FAMILY MEDICINE | Facility: CLINIC | Age: 78
End: 2023-12-28
Payer: MEDICARE

## 2023-12-28 NOTE — TELEPHONE ENCOUNTER
"Call placed back to patient. He states he has been able to access his account for his Bexagliflozin rx that was sent on 12/15 but when he pulls it up he is unable to order anything and it does not make sense to him.     Call placed to [facility] to check in regarding his rx and confirm it is ready. They state they have the rx they just need him to go into his account and \"order it\". They state they are able to walk him through the process if he is willing to call into them. Their website direct link it [employer].    Call placed back to Dennis to discuss this. He states he does not desire to call them and have them walk him through the process as what he is seeing on his end-he cannot find any active rx. Pt states he would like to come into the clinic for someone to walk him through the process.     Dr. Christy in clinic, discussed best next course of action with writer. Agrees he could come into the clinic to meet with PCS or pharmD for help to set up his account and confirm his order for the medication.     Call placed back to patient to confirm appointment time with PCS tomorrow at 10:50am. Agrees to come into the clinic tomorrow.     JUAN Brian   "

## 2023-12-28 NOTE — TELEPHONE ENCOUNTER
[facility] phone call message- general phone call:    Reason for call: PT has questions about his DM meds, pt unsure of the name of meds     Action desired: please call      Return call needed: Yes    OK to leave a message on voice mail? Yes    Advised patient to response may take up to 2 business days: No    Primary language: English      needed? No    Call taken on December 28, 2023 at 10:24 AM by Caron Cabrales

## 2023-12-29 ENCOUNTER — ALLIED HEALTH/NURSE VISIT (OUTPATIENT)
Dept: FAMILY MEDICINE | Facility: CLINIC | Age: 78
End: 2023-12-29
Payer: MEDICARE

## 2023-12-29 DIAGNOSIS — R80.9 TYPE 2 DIABETES MELLITUS WITH MICROALBUMINURIA, WITHOUT LONG-TERM CURRENT USE OF INSULIN (H): Primary | ICD-10-CM

## 2023-12-29 DIAGNOSIS — E11.29 TYPE 2 DIABETES MELLITUS WITH MICROALBUMINURIA, WITHOUT LONG-TERM CURRENT USE OF INSULIN (H): Primary | ICD-10-CM

## 2023-12-29 PROCEDURE — 99207 PR NO BILLABLE SERVICE THIS VISIT: CPT

## 2023-12-29 NOTE — PROGRESS NOTES
Help pt set up online pharmacy account to order new medication prescribed by Dr. Christy. 30 mins spent with pt calling pharmacy to help link medication to online account.

## 2024-01-19 ENCOUNTER — OFFICE VISIT (OUTPATIENT)
Dept: PHARMACY | Facility: CLINIC | Age: 79
End: 2024-01-19
Payer: MEDICARE

## 2024-01-19 DIAGNOSIS — E11.29 TYPE 2 DIABETES MELLITUS WITH MICROALBUMINURIA, WITHOUT LONG-TERM CURRENT USE OF INSULIN (H): Primary | ICD-10-CM

## 2024-01-19 DIAGNOSIS — R80.9 TYPE 2 DIABETES MELLITUS WITH MICROALBUMINURIA, WITHOUT LONG-TERM CURRENT USE OF INSULIN (H): Primary | ICD-10-CM

## 2024-01-19 PROCEDURE — 99207 PR NO CHARGE LOS: CPT | Performed by: PHARMACIST

## 2024-01-19 NOTE — PROGRESS NOTES
Clinical Pharmacy Consult:                                                    Dennis Blum is a 78 year old male seen for a clinical pharmacist consult.  He was self- referred to me     Reason for Consult: Questions about Brexavvy    Discussion: Patient walked in and wanted to speak with Dr Christy about a medication and  triaged him to me. Patient had questions about the side effects of Brexavvy and wanted to make sure it was ok to start.  Went over this medication, how it works and common and rare side effects.  Patient was reassured and will start medication tomorrow.    Plan:  1. Start Brexavvy tomorrow per Dr Christy.      Inga Jim, PharmD

## 2024-02-19 DIAGNOSIS — R80.9 TYPE 2 DIABETES MELLITUS WITH MICROALBUMINURIA, WITHOUT LONG-TERM CURRENT USE OF INSULIN (H): ICD-10-CM

## 2024-02-19 DIAGNOSIS — I10 BENIGN ESSENTIAL HYPERTENSION: ICD-10-CM

## 2024-02-19 DIAGNOSIS — E11.29 TYPE 2 DIABETES MELLITUS WITH MICROALBUMINURIA, WITHOUT LONG-TERM CURRENT USE OF INSULIN (H): ICD-10-CM

## 2024-02-20 RX ORDER — LISINOPRIL 40 MG/1
40 TABLET ORAL DAILY
Qty: 90 TABLET | Refills: 3 | Status: SHIPPED | OUTPATIENT
Start: 2024-02-20 | End: 2024-05-14

## 2024-03-29 ENCOUNTER — TELEPHONE (OUTPATIENT)
Dept: FAMILY MEDICINE | Facility: CLINIC | Age: 79
End: 2024-03-29
Payer: MEDICARE

## 2024-03-29 NOTE — TELEPHONE ENCOUNTER
Patient Quality Outreach    Patient is due for the following:   Hypertension -  BP check and Hypertension follow-up visit    Next Steps:   Schedule a office visit for HTN check     Type of outreach:    Phone, spoke to patient/parent. Patient/parent will call back to schedule.    Next Steps:  Reach out within 90 days via Phone.    Max number of attempts reached: No. Will try again in 90 days if patient still on fail list.    Questions for provider review:    None           Odette De La Cruz CMA  Chart routed to N/A.

## 2024-04-18 ENCOUNTER — TELEPHONE (OUTPATIENT)
Dept: FAMILY MEDICINE | Facility: CLINIC | Age: 79
End: 2024-04-18
Payer: MEDICARE

## 2024-04-18 DIAGNOSIS — R80.9 TYPE 2 DIABETES MELLITUS WITH MICROALBUMINURIA, WITHOUT LONG-TERM CURRENT USE OF INSULIN (H): Primary | ICD-10-CM

## 2024-04-18 DIAGNOSIS — E11.29 TYPE 2 DIABETES MELLITUS WITH MICROALBUMINURIA, WITHOUT LONG-TERM CURRENT USE OF INSULIN (H): Primary | ICD-10-CM

## 2024-04-18 NOTE — TELEPHONE ENCOUNTER
[facility] phone call message- medication clarification/question:    Full Medication Name: brenzavvy   Dose: ?    Question: this pharmacy does not have this med he needs help if he can not find a pharmacy that has thus he needs something different prescribed.     Pharmacy confirmed as    [facility] - [place] [address]: Yes    OK to leave a message on voice mail? Yes    Primary language: English      needed? No    Call taken on April 18, 2024 at 11:39 AM by Dony Chaudhry

## 2024-04-19 RX ORDER — BEXAGLIFLOZIN 20 MG/1
1 TABLET ORAL DAILY
Qty: 90 TABLET | Refills: 1 | Status: SHIPPED | OUTPATIENT
Start: 2024-04-19 | End: 2024-08-15

## 2024-04-19 NOTE — TELEPHONE ENCOUNTER
I think the issue is that they are trying to run it through as the generic and not the brand.  Re-sent prescription for Brezavvy with a HUGH code of 1.  Inga Jim PharmD

## 2024-04-29 ENCOUNTER — VIRTUAL VISIT (OUTPATIENT)
Dept: PHARMACY | Facility: CLINIC | Age: 79
End: 2024-04-29

## 2024-04-29 DIAGNOSIS — E11.29 TYPE 2 DIABETES MELLITUS WITH MICROALBUMINURIA, WITHOUT LONG-TERM CURRENT USE OF INSULIN (H): Primary | ICD-10-CM

## 2024-04-29 DIAGNOSIS — R80.9 TYPE 2 DIABETES MELLITUS WITH MICROALBUMINURIA, WITHOUT LONG-TERM CURRENT USE OF INSULIN (H): Primary | ICD-10-CM

## 2024-04-29 PROCEDURE — 99207 PR NO CHARGE LOS: CPT | Mod: 93 | Performed by: PHARMACIST

## 2024-04-29 NOTE — Clinical Note
[place] only: pharmacy requires I route this note to you- helped patient with Brenzavvy today - he should be able to be getting it now is what the pharmacy tells me  Janene

## 2024-04-29 NOTE — PROGRESS NOTES
I have verified the content of the note, which accurately reflects my assessment of the patient and the plan of care.   Ayse Moon, [facility], PharmD

## 2024-04-29 NOTE — PROGRESS NOTES
Clinical Pharmacy Consult:                                                    Dennis Blum is a 78 year old male called for a clinical pharmacist consult.  He was referred to me from Dr christy.      Reason for visit: Brenzavvy unavailable at [facility] pharmacy - patient reports pharmacy told him it was unavailable.     Allergies/ADRs: Reviewed in chart  Past Medical History: Reviewed in chart  Tobacco: He reports that he has never smoked. He has never used smokeless tobacco.  Alcohol: not currently using    Diabetes   Metformin  mg 2 tablets twice daily   Glipizide XL 10 mg daily- 2 tablets once daily - only takes when he is out of Brenzavvy  Brenzavvy 20 mg daily - has been out for 2 weeks     Discussion: Called [facility] pharmacy, they have an active order and have the medication in stock. Dennis needs to request refill via online portal. They are willing to assist with refills if patient calls in.     Plan:  Referred Dennis to reach out to [facility] pharmacy with phone number [phone]  Continue glipzide until you get Brenzavvy in the mail.  Schedule with Dr Christy for A1c recheck    Janene Dawn, Pharm.D.  Medication Therapy Management Pharmacy Resident     Telemedicine Visit Details  Type of service:  Telephone visit  Start Time: 1:19 PM  End Time: 1:40 PM

## 2024-04-30 ENCOUNTER — TELEPHONE (OUTPATIENT)
Dept: FAMILY MEDICINE | Facility: CLINIC | Age: 79
End: 2024-04-30
Payer: MEDICARE

## 2024-04-30 DIAGNOSIS — E11.29 TYPE 2 DIABETES MELLITUS WITH MICROALBUMINURIA, WITHOUT LONG-TERM CURRENT USE OF INSULIN (H): Primary | ICD-10-CM

## 2024-04-30 DIAGNOSIS — R80.9 TYPE 2 DIABETES MELLITUS WITH MICROALBUMINURIA, WITHOUT LONG-TERM CURRENT USE OF INSULIN (H): Primary | ICD-10-CM

## 2024-04-30 RX ORDER — GLIPIZIDE 10 MG/1
20 TABLET, FILM COATED, EXTENDED RELEASE ORAL DAILY
Qty: 30 TABLET | Refills: 0 | Status: SHIPPED | OUTPATIENT
Start: 2024-04-30 | End: 2024-05-13

## 2024-04-30 NOTE — TELEPHONE ENCOUNTER
[facility] phone call message- general phone call:    Reason for call: He needs a call back re the Brenzavvy that was prescribed by  the pharmacy Cristian sent it to doesn't have it the patient has called around to several different pharmacies and none of them have this or heard of this med.He has been with out this meed for two weeks he thinks  needs to just prescribed him something different.    Action desired: call back.    Return call needed: Yes    OK to leave a message on voice mail? Yes    Advised patient to response may take up to 2 business days: Yes    Primary language: English      needed? No    Call taken on April 30, 2024 at 11:37 AM by Dony Chaudhry

## 2024-04-30 NOTE — TELEPHONE ENCOUNTER
Called Dennis back. He is still unable to get Brenzavvy from [facility], he is locked out of his account and unable to reset password without assistance. Pharmacy is telling him that they do not have anything ready, despite writer confirming with pharmacy yesterday that he was able to order refills.     Recommended patient continue glipizide XL 10 mg 2 tablets daily until appointment with Dr. Christy in 2 weeks.  PharmD to assist on 5/13 with Brenzavvy reordering.     Eric Juarez.D.  Medication Therapy Management Pharmacy Resident

## 2024-05-12 NOTE — PROGRESS NOTES
Medication Therapy Management (MTM) Encounter    ASSESSMENT:                            Medication Adherence/Access: Assisted patient in ordering Brenzavvy via [facility] pharmacy website.     Diabetes:  A1c not at goal <7%, postprandial  sugars are likely high based on todays A1c. Patient may benefit from restarting Brenzavvy and continuing glipizide at a lower dose. Patient may benefit from monitoring blood sugar twice daily, recommended he check in the morning and postprandials. Could consider addition of GLP-1 in the future, though these may be cost prohibitive with patient paying out of pocket. Depending on income level, could explore eligibility for patient assistance programs.      Hypertension:  Blood pressure not at goal <130/80 mmHg today, continue current medications and recheck at next visit when patient has taken prescribed oral medications.     Hyperlipidemia:   Stable on high intensity statin, could consider atorvastatin dose increase for further LDL lowering.     Constipation  Stable     Topicals- rosacea/atopic dermatitis  Continue as directed by Dr. Christy/dermatology.     PLAN:                            MTM assisted patient in ordering Brenzavvy 20 mg daily  Take 2 glipzide XL 10 mg every day until Brenzavvy comes in  Then take 1 glipizide XL 10 mg every day and 1 Brenzavvy 20 mg every day.   Check blood sugar twice daily - sometimes in the morning, sometimes before bed    Medication issues to be addressed at a future visit:   CDE referral?   Full medication review - allergies, topicals   Increase atorvastatin to 80mg daily or change to rosuvastatin 40mg daily    Follow-up: Return in about 3 weeks (around 6/3/2024) for with PharmD.    SUBJECTIVE/OBJECTIVE:                          Dennis Blum is a 78 year old male coming in for an initial visit. He was referred to me from Dr Christy. Today's visit is a co-visit with Dr Christy.     Reason for visit: Diabetes- needs help ordering  Brenzavvy.    Allergies/ADRs: Reviewed in chart  Past Medical History: Reviewed in chart  Tobacco: He reports that he has never smoked. He has never used smokeless tobacco.  Alcohol: 1-3 beverages / week     Medication Adherence/Access: Patient has been unable to order Brenzavvy from [facility], they told him they no longer make this medication.   Per Dr Christy, patient does not have prescription coverage (no Medicare Part D) and pays out of pocket for all meds    Diabetes   Metformin  mg 2 tablets twice daily   Glipizide XL 10 mg daily- 2 tablets once daily - only takes when he is out of Brenzavvy  Brenzavvy 20 mg daily - has been out for about 4 weeks   Patient is not experiencing side effects. No trouble affording Brenzavvy, just needs assistance ordering.   Blood sugar monitoring: traditional monitoring - in the morning and before meals usually sees 120-130s   Current diabetes symptoms: none  Diet/Exercise: Unable to address today     Eye exam in the last 12 months? No  Foot exam: due  Urine Albumin:   Lab Results   Component Value Date    UMALCR 605.65 (H) 11/17/2023      Lab Results   Component Value Date    A1C 8.8 (H) 05/13/2024     Hypertension   Lisinopril 40 mg daily  Amlodipine 5 mg daily   Patient reports no current medication side effects. Has not taken blood pressure meds today yet. Patient does not self-monitor blood pressure.       BP Readings from Last 3 Encounters:   05/13/24 (!) 160/74   12/15/23 (!) 149/88   11/17/23 (!) 148/79     Pulse Readings from Last 3 Encounters:   05/13/24 67   12/15/23 66   11/17/23 74       Hyperlipidemia   Atorvastatin 40 mg daily  Patient reports no significant myalgias or other side effects.  The 10-year ASCVD risk score (Marina ALVES, et al., 2019) is: 68%    Values used to calculate the score:      Age: 78 years      Sex: Male      Is Non- : No      Diabetic: Yes      Tobacco smoker: No      Systolic Blood Pressure: 160 mmHg       "Is BP treated: Yes      HDL Cholesterol: 41 mg/dL      Total Cholesterol: 223 mg/dL       Recent Labs   Lab Test 11/17/23  1007 02/18/22  0816 03/26/21  0904 02/03/20  0934   CHOL 223* 128 169.9 256.5*   HDL 41 40 44.2 42.9   * 72 110 183*   TRIG 113 80 80.3 151.1*   CHOLHDLRATIO  --   --  3.8 6.0*     Constipation  Miralax 17 g daily as needed   Only taking as needed, about once a month for constipation. Finds effective.     Topicals- rosacea/atopic dermatitis  Metroindazole gel 0.75%  Sodium sulfacetamide   Ketoconazole shampoo  See Dr. Christy's note for discussion of symptoms.     Due to time constraints, I was only able to assess the above with the patient today. Will follow-up on other disease states in future encounters    Today's Vitals:   BP Readings from Last 1 Encounters:   05/13/24 (!) 160/74     Pulse Readings from Last 1 Encounters:   05/13/24 67     Wt Readings from Last 1 Encounters:   05/13/24 153 lb 12.8 oz (69.8 kg)     Ht Readings from Last 1 Encounters:   05/13/24 5' 8\" (1.727 m)     Estimated body mass index is 23.39 kg/m  as calculated from the following:    Height as of an earlier encounter on 5/13/24: 5' 8\" (1.727 m).    Weight as of an earlier encounter on 5/13/24: 153 lb 12.8 oz (69.8 kg).    Temp Readings from Last 1 Encounters:   05/13/24 97.4  F (36.3  C) (Oral)     ----------------    I spent 30 minutes with this patient today. All changes were made via collaborative practice agreement with Pavan Christy MD. A copy of the visit note was provided to the patient's provider(s).    A summary of these recommendations was given to the patient (see AVS from today's appointment with Dr Christy).    Janene Dawn, Pharm.D.  Medication Therapy Management Pharmacy Resident        Medication Therapy Recommendations  Type 2 diabetes mellitus with microalbuminuria, without long-term current use of insulin (H)    Current Medication: BRENZAVVY 20 MG TABS   Rationale: Medication product not available " - Adherence - Adherence   Recommendation: Provide Education   Status: Patient Agreed - Adherence/Education          Current Medication: glipiZIDE (GLUCOTROL XL) 10 MG 24 hr tablet (Discontinued)   Rationale: Dose too high - Dosage too high - Safety   Recommendation: Decrease Dose   Status: Accepted per Provider

## 2024-05-13 ENCOUNTER — OFFICE VISIT (OUTPATIENT)
Dept: FAMILY MEDICINE | Facility: CLINIC | Age: 79
End: 2024-05-13
Payer: MEDICARE

## 2024-05-13 ENCOUNTER — OFFICE VISIT (OUTPATIENT)
Dept: PHARMACY | Facility: CLINIC | Age: 79
End: 2024-05-13

## 2024-05-13 VITALS
OXYGEN SATURATION: 99 % | DIASTOLIC BLOOD PRESSURE: 74 MMHG | HEIGHT: 68 IN | WEIGHT: 153.8 LBS | TEMPERATURE: 97.4 F | SYSTOLIC BLOOD PRESSURE: 160 MMHG | RESPIRATION RATE: 20 BRPM | HEART RATE: 67 BPM | BODY MASS INDEX: 23.31 KG/M2

## 2024-05-13 DIAGNOSIS — R80.9 TYPE 2 DIABETES MELLITUS WITH MICROALBUMINURIA, WITHOUT LONG-TERM CURRENT USE OF INSULIN (H): Primary | ICD-10-CM

## 2024-05-13 DIAGNOSIS — K59.00 CONSTIPATION, UNSPECIFIED CONSTIPATION TYPE: ICD-10-CM

## 2024-05-13 DIAGNOSIS — L71.9 ROSACEA: ICD-10-CM

## 2024-05-13 DIAGNOSIS — E11.29 TYPE 2 DIABETES MELLITUS WITH MICROALBUMINURIA, WITHOUT LONG-TERM CURRENT USE OF INSULIN (H): Primary | ICD-10-CM

## 2024-05-13 DIAGNOSIS — N18.31 CKD STAGE G3A/A3, GFR 45-59 AND ALBUMIN CREATININE RATIO >300 MG/G (H): ICD-10-CM

## 2024-05-13 DIAGNOSIS — Z00.00 PREVENTATIVE HEALTH CARE: ICD-10-CM

## 2024-05-13 DIAGNOSIS — L21.9 SEBORRHEIC DERMATITIS: ICD-10-CM

## 2024-05-13 DIAGNOSIS — I10 BENIGN ESSENTIAL HYPERTENSION: ICD-10-CM

## 2024-05-13 DIAGNOSIS — E78.5 DYSLIPIDEMIA (HIGH LDL; LOW HDL): ICD-10-CM

## 2024-05-13 DIAGNOSIS — T78.40XA ALLERGY, INITIAL ENCOUNTER: ICD-10-CM

## 2024-05-13 DIAGNOSIS — L20.9 ATOPIC DERMATITIS, UNSPECIFIED TYPE: ICD-10-CM

## 2024-05-13 LAB
ANION GAP SERPL CALCULATED.3IONS-SCNC: 11 MMOL/L (ref 7–15)
BASOPHILS # BLD AUTO: 0.1 10E3/UL (ref 0–0.2)
BASOPHILS NFR BLD AUTO: 1 %
BUN SERPL-MCNC: 28.4 MG/DL (ref 8–23)
CALCIUM SERPL-MCNC: 8.7 MG/DL (ref 8.8–10.2)
CHLORIDE SERPL-SCNC: 104 MMOL/L (ref 98–107)
CHOLEST SERPL-MCNC: 205 MG/DL
CREAT SERPL-MCNC: 1.76 MG/DL (ref 0.67–1.17)
CREAT UR-MCNC: 87.1 MG/DL
DEPRECATED HCO3 PLAS-SCNC: 22 MMOL/L (ref 22–29)
EGFRCR SERPLBLD CKD-EPI 2021: 40 ML/MIN/1.73M2
EOSINOPHIL # BLD AUTO: 0.5 10E3/UL (ref 0–0.7)
EOSINOPHIL NFR BLD AUTO: 7 %
ERYTHROCYTE [DISTWIDTH] IN BLOOD BY AUTOMATED COUNT: 13.6 % (ref 10–15)
FASTING STATUS PATIENT QL REPORTED: ABNORMAL
FASTING STATUS PATIENT QL REPORTED: ABNORMAL
GLUCOSE SERPL-MCNC: 174 MG/DL (ref 70–99)
HBA1C MFR BLD: 8.8 % (ref 0–5.6)
HCT VFR BLD AUTO: 41.3 % (ref 40–53)
HDLC SERPL-MCNC: 42 MG/DL
HGB BLD-MCNC: 13.5 G/DL (ref 13.3–17.7)
IMM GRANULOCYTES # BLD: 0 10E3/UL
IMM GRANULOCYTES NFR BLD: 0 %
LDLC SERPL CALC-MCNC: 146 MG/DL
LYMPHOCYTES # BLD AUTO: 2.3 10E3/UL (ref 0.8–5.3)
LYMPHOCYTES NFR BLD AUTO: 32 %
MCH RBC QN AUTO: 28.4 PG (ref 26.5–33)
MCHC RBC AUTO-ENTMCNC: 32.7 G/DL (ref 31.5–36.5)
MCV RBC AUTO: 87 FL (ref 78–100)
MICROALBUMIN UR-MCNC: 394 MG/L
MICROALBUMIN/CREAT UR: 452.35 MG/G CR (ref 0–17)
MONOCYTES # BLD AUTO: 0.5 10E3/UL (ref 0–1.3)
MONOCYTES NFR BLD AUTO: 7 %
NEUTROPHILS # BLD AUTO: 3.7 10E3/UL (ref 1.6–8.3)
NEUTROPHILS NFR BLD AUTO: 52 %
NONHDLC SERPL-MCNC: 163 MG/DL
PLATELET # BLD AUTO: 313 10E3/UL (ref 150–450)
POTASSIUM SERPL-SCNC: 5.4 MMOL/L (ref 3.4–5.3)
RBC # BLD AUTO: 4.76 10E6/UL (ref 4.4–5.9)
SODIUM SERPL-SCNC: 137 MMOL/L (ref 135–145)
TRIGL SERPL-MCNC: 83 MG/DL
WBC # BLD AUTO: 7 10E3/UL (ref 4–11)

## 2024-05-13 PROCEDURE — 80061 LIPID PANEL: CPT | Performed by: FAMILY MEDICINE

## 2024-05-13 PROCEDURE — 99214 OFFICE O/P EST MOD 30 MIN: CPT | Performed by: FAMILY MEDICINE

## 2024-05-13 PROCEDURE — 83036 HEMOGLOBIN GLYCOSYLATED A1C: CPT | Performed by: FAMILY MEDICINE

## 2024-05-13 PROCEDURE — G2211 COMPLEX E/M VISIT ADD ON: HCPCS | Performed by: FAMILY MEDICINE

## 2024-05-13 PROCEDURE — 99207 PR NO CHARGE LOS: CPT | Performed by: PHARMACIST

## 2024-05-13 PROCEDURE — 85025 COMPLETE CBC W/AUTO DIFF WBC: CPT | Performed by: FAMILY MEDICINE

## 2024-05-13 PROCEDURE — 80048 BASIC METABOLIC PNL TOTAL CA: CPT | Performed by: FAMILY MEDICINE

## 2024-05-13 PROCEDURE — 82570 ASSAY OF URINE CREATININE: CPT | Performed by: FAMILY MEDICINE

## 2024-05-13 PROCEDURE — 82043 UR ALBUMIN QUANTITATIVE: CPT | Performed by: FAMILY MEDICINE

## 2024-05-13 PROCEDURE — 36415 COLL VENOUS BLD VENIPUNCTURE: CPT | Performed by: FAMILY MEDICINE

## 2024-05-13 RX ORDER — KETOCONAZOLE 20 MG/ML
SHAMPOO TOPICAL DAILY PRN
Qty: 120 ML | Refills: 3 | Status: SHIPPED | OUTPATIENT
Start: 2024-05-13

## 2024-05-13 RX ORDER — FEXOFENADINE HCL 60 MG/1
60 TABLET, FILM COATED ORAL 2 TIMES DAILY
Qty: 60 TABLET | Refills: 3 | Status: SHIPPED | OUTPATIENT
Start: 2024-05-13

## 2024-05-13 RX ORDER — BETAMETHASONE DIPROPIONATE 0.5 MG/G
LOTION TOPICAL 2 TIMES DAILY
Qty: 60 ML | Refills: 3 | Status: SHIPPED | OUTPATIENT
Start: 2024-05-13

## 2024-05-13 RX ORDER — RESPIRATORY SYNCYTIAL VIRUS VACCINE 120MCG/0.5
0.5 KIT INTRAMUSCULAR ONCE
Qty: 1 EACH | Refills: 0 | Status: CANCELLED | OUTPATIENT
Start: 2024-05-13 | End: 2024-05-13

## 2024-05-13 RX ORDER — GLIPIZIDE 10 MG/1
10 TABLET, FILM COATED, EXTENDED RELEASE ORAL DAILY
Qty: 30 TABLET | Refills: 3 | Status: SHIPPED | OUTPATIENT
Start: 2024-05-13 | End: 2024-05-14

## 2024-05-13 NOTE — PROGRESS NOTES
I have verified the content of the note, which accurately reflects my assessment of the patient and the plan of care.   Ayse Moon, [facility], PharmD

## 2024-05-13 NOTE — PATIENT INSTRUCTIONS
Take 2 glipzide every day until Brenzavvy comes in  Then take 1 glipizide every day and 1 Brenzavvy every day.   Check blood sugar twice daily - sometimes in the morning, sometimes before bed    Reordering Brenzavvy:   Login to [facility] website   Prescription manager on the three lines   Select Brenzavvy and add to cart  Proceed to checkout  Confirm address, select shipping   Enter card information or saved card information    Blood Sugar Goals:  Morning (before eating) :   Evening (2 hours after eating) : Less than 180

## 2024-05-13 NOTE — PATIENT INSTRUCTIONS
Take 2 glipzide every day until Brenzavvy comes in  Then take 1 glipizide every day and 1 Brenzavvy every day.   Check blood sugar twice daily - sometimes in the morning, sometimes before bed    Reordering Brenzavvy:   Login to [facility] [employer] website   Prescription manager on the three lines   Select Brenzavvy and add to cart  Proceed to checkout  Confirm address, select shipping   Enter card information or saved card information    Blood Sugar Goals:  Morning (before eating) :   Evening (2 hours after eating) : Less than 180       never

## 2024-05-13 NOTE — PROGRESS NOTES
ASSESSMENT/PLAN:  Dennis was seen today for diabetes, derm problem, allergies, medication problem, other and medication reconciliation.    Diagnoses and all orders for this visit:    Type 2 diabetes mellitus with microalbuminuria, without long-term current use of insulin (H)  -     Hemoglobin A1c  -     Basic metabolic panel  -     Lipid Profile  -     Albumin Random Urine Quantitative with Creat Ratio  -     atorvastatin (LIPITOR) 40 MG tablet; Take 1 tab daily  -     glipiZIDE (GLUCOTROL XL) 10 MG 24 hr tablet; Take 1 tablet (10 mg) by mouth daily  -     metFORMIN (GLUCOPHAGE) 500 MG tablet; TAKE TWO TABLETS BY MOUTH DAILY WITH MEALS    CKD stage G3a/A3, GFR 45-59 and albumin creatinine ratio >300 mg/g (H)  -     CBC with Platelets & Differential    Allergy, initial encounter  -     fexofenadine (ALLEGRA) 60 MG tablet; Take 1 tablet (60 mg) by mouth 2 times daily    Atopic dermatitis, unspecified type  -     betamethasone dipropionate (DIPROSONE) 0.05 % external lotion; Apply topically 2 times daily To back and scalp PRN itching    Seborrheic dermatitis  -     ketoconazole (NIZORAL) 2 % external shampoo; Apply topically daily as needed for itching or irritation    Benign essential hypertension  -     amLODIPine (NORVASC) 5 MG tablet; Take 1 tablet (5 mg) by mouth daily  -     lisinopril (ZESTRIL) 40 MG tablet; Take 1 tablet (40 mg) by mouth daily    Preventative health care  -     aspirin 81 MG EC tablet; Take 1 tablet (81 mg) by mouth daily      1.  Diabetes: Due to his inability to obtain the SGLT2 inhibitor he had returned to taking glipizide which is less effective and explains the increase in A1c.  Pharmacy is working with him to enable him to get a refill of the prescribed SGLT2 inhibitor.    2.  Seborrheic dermatitis with allergies: We discussed the fact that being allergic to cats likely makes his dermatitis worse.  I recommended he take a daily antihistamine.  Due to renal dosing I have had to reduce the  dose of his fexofenadine to 60 mg twice daily.  In addition I prescribed a moderate potency steroid to be used to the patches of irritation on his back.  I recommended avoidance of perfumes and other potential irritants and wearing cotton clothing closest to his skin.  I have also prescribed ketoconazole shampoo which apparently he has never tried.    The longitudinal plan of care for the diagnosis(es)/condition(s) as documented were addressed during this visit. Due to the added complexity in care, I will continue to support Dennis in the subsequent management and with ongoing continuity of care.    I have encouraged him to follow-up as needed.      Total visit time with patient was 25 mins, all of which was face to face MD time, and over 50% of this time was spent in counseling and coordination of care.  Including post-encounter documentation and orders on the date of service, total encounter time was 32 mins.    Shelli Collins is a 78 year old, presenting for the following health issues:  Diabetes, Derm Problem (Itching on back and head), Allergies (Eyes watery, sneezing and cough, works around cat and think that it's making allergies worsen), Medication Problem (Brezavvy), Other (On and off constipation and diarrhea/), and Medication Reconciliation (Did not review, patient is seeing pharm d)     This is a 78-year-old who is well-known to me who attends today with several concerns:  1.  He has a long history of seborrheic dermatitis and has seen a dermatologist.  He says that the last time he saw the dermatologist the treatment did not work very well.  Presently he describes itching at the back of his scalp and both at his upper and lower back.  He finds that taking his shirt off and exposing it to the sun helps and I encouraged him to continue doing so.  He knows that he should avoid polyester shirts and try to use cotton clothes.    2.  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  He is pretty sure  "that he is allergic to cats since he starts itching and sneezing around him.  He is not taking an antihistamine but has in the past.    3.  Concerning his diabetes he pays out-of-pocket for his medications and for this reason we had prescribed a cost effective SGLT2 inhibitor, Brezavvy.  He has had difficulty obtaining refills of this and this partly may be due to some challenges with digital technology since I have refilled the medication for him.    Review of Systems   No other concerns today    Objective    Physical Exam   BP (!) 160/74 (BP Location: Left arm, Patient Position: Sitting, Cuff Size: Adult Regular)   Pulse 67   Temp 97.4  F (36.3  C) (Oral)   Resp 20   Ht 1.727 m (5' 8\")   Wt 69.8 kg (153 lb 12.8 oz)   SpO2 99%   BMI 23.39 kg/m       Vitals BP is elevated but he has not yet taken his daily antihypertensive  Well nourished and in no distress  Skin: He has rosacea on his face which is not particularly active at present.  On his upper back he has several patches of dry flaking dermatitis and also at the lower back with evidence of excoriation.  There are no suspicious nevi.  No lower extremity edema       Results for orders placed or performed in visit on 05/13/24   Hemoglobin A1c     Status: Abnormal   Result Value Ref Range    Hemoglobin A1C 8.8 (H) 0.0 - 5.6 %   Basic metabolic panel     Status: Abnormal   Result Value Ref Range    Sodium 137 135 - 145 mmol/L    Potassium 5.4 (H) 3.4 - 5.3 mmol/L    Chloride 104 98 - 107 mmol/L    Carbon Dioxide (CO2) 22 22 - 29 mmol/L    Anion Gap 11 7 - 15 mmol/L    Urea Nitrogen 28.4 (H) 8.0 - 23.0 mg/dL    Creatinine 1.76 (H) 0.67 - 1.17 mg/dL    GFR Estimate 40 (L) >60 mL/min/1.73m2    Calcium 8.7 (L) 8.8 - 10.2 mg/dL    Glucose 174 (H) 70 - 99 mg/dL    Patient Fasting > 8hrs? Unknown    Lipid Profile     Status: Abnormal   Result Value Ref Range    Cholesterol 205 (H) <200 mg/dL    Triglycerides 83 <150 mg/dL    Direct Measure HDL 42 >=40 mg/dL    LDL " Cholesterol Calculated 146 (H) <=100 mg/dL    Non HDL Cholesterol 163 (H) <130 mg/dL    Patient Fasting > 8hrs? Unknown     Narrative    Cholesterol  Desirable:  <200 mg/dL    Triglycerides  Normal:  Less than 150 mg/dL  Borderline High:  150-199 mg/dL  High:  200-499 mg/dL  Very High:  Greater than or equal to 500 mg/dL    Direct Measure HDL  Female:  Greater than or equal to 50 mg/dL   Male:  Greater than or equal to 40 mg/dL    LDL Cholesterol  Desirable:  <100mg/dL  Above Desirable:  100-129 mg/dL   Borderline High:  130-159 mg/dL   High:  160-189 mg/dL   Very High:  >= 190 mg/dL    Non HDL Cholesterol  Desirable:  130 mg/dL  Above Desirable:  130-159 mg/dL  Borderline High:  160-189 mg/dL  High:  190-219 mg/dL  Very High:  Greater than or equal to 220 mg/dL   Albumin Random Urine Quantitative with Creat Ratio     Status: Abnormal   Result Value Ref Range    Creatinine Urine mg/dL 87.1 mg/dL    Albumin Urine mg/L 394.0 mg/L    Albumin Urine mg/g Cr 452.35 (H) 0.00 - 17.00 mg/g Cr   CBC with platelets and differential     Status: None   Result Value Ref Range    WBC Count 7.0 4.0 - 11.0 10e3/uL    RBC Count 4.76 4.40 - 5.90 10e6/uL    Hemoglobin 13.5 13.3 - 17.7 g/dL    Hematocrit 41.3 40.0 - 53.0 %    MCV 87 78 - 100 fL    MCH 28.4 26.5 - 33.0 pg    MCHC 32.7 31.5 - 36.5 g/dL    RDW 13.6 10.0 - 15.0 %    Platelet Count 313 150 - 450 10e3/uL    % Neutrophils 52 %    % Lymphocytes 32 %    % Monocytes 7 %    % Eosinophils 7 %    % Basophils 1 %    % Immature Granulocytes 0 %    Absolute Neutrophils 3.7 1.6 - 8.3 10e3/uL    Absolute Lymphocytes 2.3 0.8 - 5.3 10e3/uL    Absolute Monocytes 0.5 0.0 - 1.3 10e3/uL    Absolute Eosinophils 0.5 0.0 - 0.7 10e3/uL    Absolute Basophils 0.1 0.0 - 0.2 10e3/uL    Absolute Immature Granulocytes 0.0 <=0.4 10e3/uL   CBC with Platelets & Differential     Status: None    Narrative    The following orders were created for panel order CBC with Platelets & Differential.  Procedure                                Abnormality         Status                     ---------                               -----------         ------                     CBC with platelets and d...[[id number]]                      Final result                 Please view results for these tests on the individual orders.

## 2024-05-14 RX ORDER — ATORVASTATIN CALCIUM 40 MG/1
TABLET, FILM COATED ORAL
Qty: 90 TABLET | Refills: 1 | Status: SHIPPED | OUTPATIENT
Start: 2024-05-14

## 2024-05-14 RX ORDER — LISINOPRIL 40 MG/1
40 TABLET ORAL DAILY
Qty: 90 TABLET | Refills: 1 | Status: SHIPPED | OUTPATIENT
Start: 2024-05-14

## 2024-05-14 RX ORDER — GLIPIZIDE 10 MG/1
10 TABLET, FILM COATED, EXTENDED RELEASE ORAL DAILY
Qty: 90 TABLET | Refills: 1 | Status: SHIPPED | OUTPATIENT
Start: 2024-05-14 | End: 2024-10-07

## 2024-05-14 RX ORDER — ASPIRIN 81 MG/1
81 TABLET ORAL DAILY
Qty: 90 TABLET | Refills: 1 | Status: SHIPPED | OUTPATIENT
Start: 2024-05-14

## 2024-05-14 RX ORDER — AMLODIPINE BESYLATE 5 MG/1
5 TABLET ORAL DAILY
Qty: 90 TABLET | Refills: 1 | Status: SHIPPED | OUTPATIENT
Start: 2024-05-14

## 2024-05-14 NOTE — RESULT ENCOUNTER NOTE
I called patient because of high LDL and worsening CKD.  He apparently ran out of his usual daily meds but did not tell me this.  He is agreeable to restart.  Discussed referral to nephrology - he'd prefer to wait.  Recommended reduce metformin dose due to CKD - lowered to total 1,000mgs daily - could lower further.  Rec return in 3-4 months to recheck labs.

## 2024-06-03 ENCOUNTER — TELEPHONE (OUTPATIENT)
Dept: PHARMACY | Facility: CLINIC | Age: 79
End: 2024-06-03
Payer: MEDICARE

## 2024-06-03 NOTE — TELEPHONE ENCOUNTER
PharmD Outbound Call:     Reason for call: MTM appointment today to discuss Brenzavvy/ medication adherence    Call attempt: x2, no answer, left VM for patient to call and reschedule visit at - [phone]     Ok to page me overhead if patient returns call this morning to complete follow up call       Eric Juarez.D.  Medication Therapy Management Pharmacy Resident

## 2024-06-22 ENCOUNTER — HEALTH MAINTENANCE LETTER (OUTPATIENT)
Age: 79
End: 2024-06-22

## 2024-08-15 DIAGNOSIS — R80.9 TYPE 2 DIABETES MELLITUS WITH MICROALBUMINURIA, WITHOUT LONG-TERM CURRENT USE OF INSULIN (H): ICD-10-CM

## 2024-08-15 DIAGNOSIS — E11.29 TYPE 2 DIABETES MELLITUS WITH MICROALBUMINURIA, WITHOUT LONG-TERM CURRENT USE OF INSULIN (H): ICD-10-CM

## 2024-08-15 RX ORDER — BEXAGLIFLOZIN 20 MG/1
1 TABLET ORAL DAILY
Qty: 90 TABLET | Refills: 0 | Status: SHIPPED | OUTPATIENT
Start: 2024-08-15

## 2024-08-15 NOTE — TELEPHONE ENCOUNTER
Medication Question or Refill        What medication are you calling about (include dose and sig)?: BRENZAVVY 20 MG TABS - Take 1 tablet by mouth daily - Oral     Preferred Pharmacy:      [facility] - [place], [place] - [address]  [address]  [facility] [address]  Phone: [phone] Fax: [phone]      Controlled Substance Agreement on file:   CSA -- Patient Level:    CSA: None found at the patient level.       Who prescribed the medication?: power    Do you need a refill? Yes    When did you use the medication last? today    Patient offered an appointment? No    Do you have any questions or concerns?  No patient was told that Dr Christy has to call the pharmacy to ok it      Could we send this information to you in [facility] or would you prefer to receive a phone call?:   Patient would prefer a phone call   Okay to leave a detailed message?: Yes at Home number on file [phone] (home)    Does patient or caller know when to expect a call? Yes, PCS will return call within 24 business hours.       Padilla Kate on 8/15/2024 at 1:08 PM

## 2024-10-07 DIAGNOSIS — E11.29 TYPE 2 DIABETES MELLITUS WITH MICROALBUMINURIA, WITHOUT LONG-TERM CURRENT USE OF INSULIN (H): ICD-10-CM

## 2024-10-07 DIAGNOSIS — R80.9 TYPE 2 DIABETES MELLITUS WITH MICROALBUMINURIA, WITHOUT LONG-TERM CURRENT USE OF INSULIN (H): ICD-10-CM

## 2024-10-07 RX ORDER — GLIPIZIDE 10 MG/1
10 TABLET, FILM COATED, EXTENDED RELEASE ORAL DAILY
Qty: 90 TABLET | Refills: 0 | Status: SHIPPED | OUTPATIENT
Start: 2024-10-07

## 2024-10-07 NOTE — TELEPHONE ENCOUNTER
Name from pharmacy: GLIPIZIDE ER 10MG TB24          Will file in chart as: glipiZIDE (GLUCOTROL XL) 10 MG 24 hr tablet    Sig: TAKE ONE TABLET BY MOUTH EVERY DAY    Disp: 90 tablet    Refills: 1    Start: 10/7/2024    Class: E-Prescribe    Non-formulary For: Type 2 diabetes mellitus with microalbuminuria, without long-term current use of insulin (H)    Last ordered: 4 months ago (5/14/2024) by Pavan Christy MD    Last refill: 8/19/2024    Rx #: [id number]    Sulfonylurea Agents Yaurpt35/07/2024 03:02 PM   Protocol Details Patient has documented A1c within the specified period of time.    Has GFR on file in past 12 months and most recent value is normal    Patient has a recent creatinine (normal) within the past 12 mos.        Lotus RN, BSN

## 2024-11-13 ENCOUNTER — TELEPHONE (OUTPATIENT)
Dept: FAMILY MEDICINE | Facility: CLINIC | Age: 79
End: 2024-11-13
Payer: MEDICARE

## 2024-11-13 NOTE — TELEPHONE ENCOUNTER
Patient Quality Outreach    Patient is due for the following:   Hypertension -  BP check and Hypertension follow-up visit    Action(s) Taken:   Schedule a office visit for HTN follow up    Type of outreach:    Sent [employer] message.    Questions for provider review:    None           Odette De La Cruz CMA  Chart routed to N/A.

## 2024-11-21 ENCOUNTER — OFFICE VISIT (OUTPATIENT)
Dept: FAMILY MEDICINE | Facility: CLINIC | Age: 79
End: 2024-11-21
Payer: MEDICARE

## 2024-11-21 VITALS
WEIGHT: 153 LBS | RESPIRATION RATE: 20 BRPM | HEIGHT: 68 IN | BODY MASS INDEX: 23.19 KG/M2 | OXYGEN SATURATION: 100 % | TEMPERATURE: 97.8 F | HEART RATE: 81 BPM | DIASTOLIC BLOOD PRESSURE: 79 MMHG | SYSTOLIC BLOOD PRESSURE: 151 MMHG

## 2024-11-21 DIAGNOSIS — I10 BENIGN ESSENTIAL HYPERTENSION: ICD-10-CM

## 2024-11-21 DIAGNOSIS — J31.0 RHINITIS, UNSPECIFIED TYPE: ICD-10-CM

## 2024-11-21 DIAGNOSIS — R80.9 TYPE 2 DIABETES MELLITUS WITH MICROALBUMINURIA, WITHOUT LONG-TERM CURRENT USE OF INSULIN (H): Primary | ICD-10-CM

## 2024-11-21 DIAGNOSIS — E11.29 TYPE 2 DIABETES MELLITUS WITH MICROALBUMINURIA, WITHOUT LONG-TERM CURRENT USE OF INSULIN (H): Primary | ICD-10-CM

## 2024-11-21 DIAGNOSIS — R01.1 HEART MURMUR: ICD-10-CM

## 2024-11-21 RX ORDER — FEXOFENADINE HCL 60 MG/1
60 TABLET, FILM COATED ORAL 2 TIMES DAILY
Qty: 60 TABLET | Refills: 3 | Status: SHIPPED | OUTPATIENT
Start: 2024-11-21

## 2024-11-21 RX ORDER — BEXAGLIFLOZIN 20 MG/1
1 TABLET ORAL DAILY
Qty: 90 TABLET | Refills: 0 | Status: SHIPPED | OUTPATIENT
Start: 2024-11-21

## 2024-11-21 RX ORDER — AMLODIPINE BESYLATE 5 MG/1
5 TABLET ORAL DAILY
Qty: 90 TABLET | Refills: 1 | Status: SHIPPED | OUTPATIENT
Start: 2024-11-21

## 2024-11-21 RX ORDER — GLIPIZIDE 10 MG/1
10 TABLET, FILM COATED, EXTENDED RELEASE ORAL DAILY
Qty: 90 TABLET | Refills: 0 | Status: SHIPPED | OUTPATIENT
Start: 2024-11-21

## 2024-11-21 RX ORDER — LISINOPRIL 40 MG/1
40 TABLET ORAL DAILY
Qty: 90 TABLET | Refills: 1 | Status: SHIPPED | OUTPATIENT
Start: 2024-11-21

## 2024-11-21 NOTE — PATIENT INSTRUCTIONS
-restart BP meds   -re-ordered Brenzavy if pharmacy gives you a hard time let me know I can order a different pill such as Jardiance or Rybelsius  -start allegra for runny nose   -labs next week

## 2024-11-21 NOTE — LETTER
2024    Dennis Blum   1945        To Whom it May Concern;    Please excuse Dennis Blum from work for a healthcare visit on 2024.    Sincerely,        Edgard Morgan MD

## 2024-11-21 NOTE — PROGRESS NOTES
Assessment & Plan     Benign essential hypertension  Elevated to the 140s-to 150s today, asymptomatic.  Has been off his meds for 5 days. Discussed importance of getting refills prior to him being completely out but for him to get his refills when he has about a week or so left to avoid times such as these when he is without meds.   - lisinopril (ZESTRIL) 40 MG tablet  Dispense: 90 tablet; Refill: 1  - amLODIPine (NORVASC) 5 MG tablet  Dispense: 90 tablet; Refill: 1  - Basic metabolic panel    Type 2 diabetes mellitus with microalbuminuria, without long-term current use of insulin (H)  Last A1c of 8.8 in May of this year.  On glipizide 20 mg daily, metformin 1000 mg daily.  Reordered SGLT2 inhibitor, if patient is unable to obtain this we discussed ordering Jardiance.  He may also benefit from a GLP-1 such as Rybelsus.  I ordered labs to be drawn for lab only visit next week as the patient has to go and cannot stay. Diabetic foot exam done today, within normal limits. Has been getting pedicures.   - HEMOGLOBIN A1C  - glipiZIDE (GLUCOTROL XL) 10 MG 24 hr tablet  Dispense: 90 tablet; Refill: 0  - BRENZAVVY 20 MG TABS  Dispense: 90 tablet; Refill: 0  - Lipid panel reflex to direct LDL Fasting    Heart murmur  3-4/6 holosystolic murmur.  Patient is asymptomatic from this murmur.  He can perform 4 METS without getting winded.  No clinical findings of hypervolemia.  We discussed obtaining an echo, he is agreeable.  - Echocardiogram Complete    Rhinitis, unspecified type  Suspect allergic rhinitis versus postnasal drip versus sinusitis.  His sinuses were nontender to palpation.  He is afebrile and well-appearing.  Throat without erythema.  No adenopathy.  He had Allegra on his medication list in the past but denies ever picking it up.  I will reorder this today.  Patient agrees with this plan.  -Allegra 60 mg twice daily          Return in about 1 week (around 11/28/2024) for Follow up.    Shelli   Dennis is a 79  year old, presenting for the following health issues:  Nasal Congestion (Has been about 3 weeks.), OTHER (Unable to well and check for sinus), and Refill Request        11/21/2024     8:31 AM   Additional Questions   Roomed by Porter MIXON   Accompanied by Self         11/21/2024    Information    services provided? No        HPI     Dennis is a 79-year-old male new to me with a history of type 2 diabetes, hypertension, dyslipidemia, systolic murmur, eczema, CKD and he is here today for medication refill and sinus pressure.  He reports to me that for about the last 3 weeks he has had a runny nose, sinus pressure and has had to blow his nose frequently resulting in dry skin and sometimes bloody nose.  Sometimes he wakes up at night because he feels like his nose gets clogged. he denies fevers, sore throat, ear pain, headaches.  He denies any cocaine use.  He has not tried anything for this.    Diabetes -has been checking sugars once daily.  He checks them in the morning after his morning tea.  AM sugar yesterday was 112. On metformin, glipizide. Stopped the brenzavy because pharmacy issues.  Unclear if he ever started it.  Also unclear why Brenzavvy was the first choice for him.  He had a pedicure 2 weeks ago.  He denies any symptoms such as neuropathy, polyuria, polydipsia, loss of balance, falls.     HTN - ran out of meds 5 days ago.  Checks blood pressure at home usually runs 120-130 he  tells me, he denies symptoms from his hypertension.  He denies any side effects from his medications such as headache, lower extremity swelling.     Late for job at [employer].  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    History of heart murmur-he thought that in the past he was told his murmur disappeared.  He denies any chest pain, shortness of breath, orthopnea, dyspnea on exertion, paroxysmal nocturnal dyspnea, lower extremity edema, weight gain.  He had echo was ordered in the past that were  "discontinued.  He is agreeable today to getting an echo.    Review of Systems  Constitutional, HEENT, cardiovascular, pulmonary, gi and gu systems are negative, except as otherwise noted.      Objective    BP (!) 151/79   Pulse 81   Temp 97.8  F (36.6  C) (Tympanic)   Resp 20   Ht 1.727 m (5' 8\")   Wt 69.4 kg (153 lb)   SpO2 100%   BMI 23.26 kg/m    Body mass index is 23.26 kg/m .  Physical Exam   GENERAL: alert and no distress  NECK: no adenopathy, no asymmetry, masses, or scars  RESP: lungs clear to auscultation - no rales, rhonchi or wheezes  CV: regular rate and rhythm, normal S1 S2, no S3 or S4, 3/6 holosystolic murmur heard throughout (best at right upper sternal border), click or rub, no peripheral edema  ABDOMEN: soft, nontender, no hepatosplenomegaly, no masses and bowel sounds normal  MS: no gross musculoskeletal defects noted, no edema  SKIN: no suspicious lesions or rashes  NEURO: Normal strength and tone, mentation intact and speech normal  PSYCH: mentation appears normal, affect normal/bright  Sinuses: Nontender to palpation  Foot exam:  Diabetic foot exam: normal DP and PT pulses, no trophic changes or ulcerative lesions, normal sensory exam, normal monofilament exam, and nail exam normal nails without lesions              Signed Electronically by: Edgard Morgan MD  {Email feedback regarding this note to [email or web address]   :[id number]}  "

## 2024-11-25 ENCOUNTER — TELEPHONE (OUTPATIENT)
Dept: FAMILY MEDICINE | Facility: CLINIC | Age: 79
End: 2024-11-25
Payer: MEDICARE

## 2024-11-25 DIAGNOSIS — J01.90 ACUTE SINUSITIS WITH SYMPTOMS GREATER THAN 10 DAYS: Primary | ICD-10-CM

## 2024-11-25 NOTE — TELEPHONE ENCOUNTER
Symptoms    Describe your symptoms: Headache, stuffy nose w/ green and yellow discharge x3-4 wks, Chills at night x2 wks    Any pain: Yes: headache and stuffy nose    How long have you been having symptoms: 4  weeks    Have you been seen for this:  Yes: 11/21/24 w/Dr. Morgan    Appointment offered?: Yes: but pt decline and only wants faculty provider    Triage offered?: No    Home remedies tried: none    Preferred Pharmacy:   [facility] [place] [facility] [place] - [address] [place] [address] [address]  [address] [place] [id number]  Phone: [phone] Fax: [phone]        Could we send this information to you in [facility] or would you prefer to receive a phone call?:   Patient would prefer a phone call   Okay to leave a detailed message?: Yes at Home number on file [phone] (home)    Does patient or caller know when to expect a call? Yes, PCS will return call within 24 business hours.       Rabia Gonzalez CMA on 11/25/2024 at 4:35 PM

## 2024-11-25 NOTE — Clinical Note
Thank you for doing this. He was very appreciative and thankful. Just thought I'd let you know.   DESTINY Henry on 11/26/2024 at 4:34 PM

## 2024-11-25 NOTE — TELEPHONE ENCOUNTER
Pt called upset that the resident did not prescribe him the correct medication because he has not been feeling better. He would like someone to prescribe something else for his sinus issue that he is having.     Pt was seen on 11/21/24 and medication is not working.    Offered to schedule an appt for tomorrow with resident but pt stated that he does not want a student doctor and wants a faculty doctor. 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 He only wants to see a faculty provider. Notified pt that, Also due to the holidays, the soonest opening I can get him with a faculty would be 12/2/24.     Pt stated that date is too far out for him. Please advise.     DESTINY Henry on 11/25/2024 at 4:46 PM

## 2024-11-26 NOTE — TELEPHONE ENCOUNTER
Called pt and notified him of the medication Dr. Christy sent to the pharmacy. Pt stated understanding of the directions. He was very appreciative of it and was very thankful.     DESTINY Henry on 11/26/2024 at 4:32 PM

## 2024-11-26 NOTE — PROGRESS NOTES
Physician Attestation   I, Ren Taylor MD, saw this patient and agree with the findings and plan of care as documented in the note.      Items personally reviewed/procedural attestation: vitals.    Ren Taylor MD

## 2024-11-27 RX ORDER — FLUTICASONE PROPIONATE 50 MCG
2 SPRAY, SUSPENSION (ML) NASAL DAILY
Qty: 16 G | Refills: 3 | Status: SHIPPED | OUTPATIENT
Start: 2024-11-27

## 2025-01-04 ENCOUNTER — HEALTH MAINTENANCE LETTER (OUTPATIENT)
Age: 80
End: 2025-01-04

## 2025-01-09 DIAGNOSIS — R80.9 TYPE 2 DIABETES MELLITUS WITH MICROALBUMINURIA, WITHOUT LONG-TERM CURRENT USE OF INSULIN (H): ICD-10-CM

## 2025-01-09 DIAGNOSIS — E11.29 TYPE 2 DIABETES MELLITUS WITH MICROALBUMINURIA, WITHOUT LONG-TERM CURRENT USE OF INSULIN (H): ICD-10-CM

## 2025-01-09 NOTE — TELEPHONE ENCOUNTER
Name from pharmacy: METFORMIN HCL 500MG TABS         Will file in chart as: metFORMIN (GLUCOPHAGE) 500 MG tablet    Sig: TAKE TWO TABLETS BY MOUTH EVERY DAY WITH A MEAL    Disp: 180 tablet    Refills: 1    Start: 1/9/2025    Class: E-Prescribe    Non-formulary For: Type 2 diabetes mellitus with microalbuminuria, without long-term current use of insulin (H)    Last ordered: 8 months ago (5/14/2024) by Pavan Christy MD    Last refill: 10/7/2024    Rx #: [id number]    Biguanide Agents Hipmti0401/09/2025 09:02 AM   Protocol Details Patient has documented A1c within the specified period of time.    Has GFR on file in past 12 months and most recent value is normal          Routed to provider due to failed RN protocol.         Abdon Ochoa, RN, MSN

## 2025-02-27 ENCOUNTER — OFFICE VISIT (OUTPATIENT)
Dept: FAMILY MEDICINE | Facility: CLINIC | Age: 80
End: 2025-02-27
Payer: MEDICARE

## 2025-02-27 VITALS
OXYGEN SATURATION: 99 % | BODY MASS INDEX: 24.15 KG/M2 | WEIGHT: 158.8 LBS | DIASTOLIC BLOOD PRESSURE: 76 MMHG | RESPIRATION RATE: 16 BRPM | HEART RATE: 81 BPM | SYSTOLIC BLOOD PRESSURE: 166 MMHG | TEMPERATURE: 97.3 F

## 2025-02-27 DIAGNOSIS — E11.29 TYPE 2 DIABETES MELLITUS WITH MICROALBUMINURIA, WITHOUT LONG-TERM CURRENT USE OF INSULIN (H): ICD-10-CM

## 2025-02-27 DIAGNOSIS — R80.9 TYPE 2 DIABETES MELLITUS WITH MICROALBUMINURIA, WITHOUT LONG-TERM CURRENT USE OF INSULIN (H): ICD-10-CM

## 2025-02-27 DIAGNOSIS — S51.012S SKIN TEAR OF LEFT ELBOW WITHOUT COMPLICATION, SEQUELA: Primary | ICD-10-CM

## 2025-02-27 NOTE — PROGRESS NOTES
Patient Active Problem List    Diagnosis Date Noted    History of basal cell carcinoma 11/22/2023     Priority: Medium    Nummular eczema 02/09/2023     Priority: Medium    Erectile dysfunction, unspecified erectile dysfunction type 02/09/2023     Priority: Medium    Chronic idiopathic constipation 02/09/2023     Priority: Medium    Rosacea 01/14/2021     Priority: Medium    Systolic murmur 01/03/2019     Priority: Medium    Benign essential hypertension 07/23/2015     Priority: Medium    Type 2 diabetes mellitus with microalbuminuria, without long-term current use of insulin (H) 07/23/2015     Priority: Medium    CKD stage G3a/A3, GFR 45-59 and albumin creatinine ratio >300 mg/g (H) 07/23/2015     Priority: Medium    Dyslipidemia (high LDL; low HDL) 07/23/2015     Priority: Medium     There are no exam notes on file for this visit.  Chief Complaint   Patient presents with    Other     Check wound      Blood pressure (!) 166/76, pulse 81, temperature 97.3  F (36.3  C), temperature source Oral, resp. rate 16, weight 72 kg (158 lb 12.8 oz), SpO2 99%.  No results found for any visits on 02/27/25.

## 2025-03-06 ENCOUNTER — LAB (OUTPATIENT)
Dept: LAB | Facility: CLINIC | Age: 80
End: 2025-03-06
Payer: MEDICARE

## 2025-03-06 DIAGNOSIS — R80.9 TYPE 2 DIABETES MELLITUS WITH MICROALBUMINURIA, WITHOUT LONG-TERM CURRENT USE OF INSULIN (H): Primary | ICD-10-CM

## 2025-03-06 DIAGNOSIS — N18.31 CKD STAGE G3A/A3, GFR 45-59 AND ALBUMIN CREATININE RATIO >300 MG/G (H): ICD-10-CM

## 2025-03-06 DIAGNOSIS — E11.29 TYPE 2 DIABETES MELLITUS WITH MICROALBUMINURIA, WITHOUT LONG-TERM CURRENT USE OF INSULIN (H): ICD-10-CM

## 2025-03-06 DIAGNOSIS — R80.9 TYPE 2 DIABETES MELLITUS WITH MICROALBUMINURIA, WITHOUT LONG-TERM CURRENT USE OF INSULIN (H): ICD-10-CM

## 2025-03-06 DIAGNOSIS — E11.29 TYPE 2 DIABETES MELLITUS WITH MICROALBUMINURIA, WITHOUT LONG-TERM CURRENT USE OF INSULIN (H): Primary | ICD-10-CM

## 2025-03-06 LAB
ANION GAP SERPL CALCULATED.3IONS-SCNC: 8 MMOL/L (ref 7–15)
BUN SERPL-MCNC: 34.7 MG/DL (ref 8–23)
CALCIUM SERPL-MCNC: 8.7 MG/DL (ref 8.8–10.4)
CHLORIDE SERPL-SCNC: 99 MMOL/L (ref 98–107)
CHOLEST SERPL-MCNC: 218 MG/DL
CREAT SERPL-MCNC: 1.8 MG/DL (ref 0.67–1.17)
CREAT UR-MCNC: 83.8 MG/DL
EGFRCR SERPLBLD CKD-EPI 2021: 38 ML/MIN/1.73M2
ERYTHROCYTE [DISTWIDTH] IN BLOOD BY AUTOMATED COUNT: 13.3 % (ref 10–15)
EST. AVERAGE GLUCOSE BLD GHB EST-MCNC: 240 MG/DL
FASTING STATUS PATIENT QL REPORTED: ABNORMAL
FASTING STATUS PATIENT QL REPORTED: ABNORMAL
GLUCOSE SERPL-MCNC: 315 MG/DL (ref 70–99)
HBA1C MFR BLD: 10 % (ref 0–5.6)
HCO3 SERPL-SCNC: 25 MMOL/L (ref 22–29)
HCT VFR BLD AUTO: 40 % (ref 40–53)
HDLC SERPL-MCNC: 40 MG/DL
HGB BLD-MCNC: 13.3 G/DL (ref 13.3–17.7)
LDLC SERPL CALC-MCNC: 149 MG/DL
MCH RBC QN AUTO: 28.5 PG (ref 26.5–33)
MCHC RBC AUTO-ENTMCNC: 33.3 G/DL (ref 31.5–36.5)
MCV RBC AUTO: 86 FL (ref 78–100)
MICROALBUMIN UR-MCNC: 799 MG/L
MICROALBUMIN/CREAT UR: 953.46 MG/G CR (ref 0–17)
NONHDLC SERPL-MCNC: 178 MG/DL
PLATELET # BLD AUTO: 311 10E3/UL (ref 150–450)
POTASSIUM SERPL-SCNC: 5.6 MMOL/L (ref 3.4–5.3)
RBC # BLD AUTO: 4.67 10E6/UL (ref 4.4–5.9)
SODIUM SERPL-SCNC: 132 MMOL/L (ref 135–145)
TRIGL SERPL-MCNC: 147 MG/DL
WBC # BLD AUTO: 7.2 10E3/UL (ref 4–11)

## 2025-03-07 ENCOUNTER — OFFICE VISIT (OUTPATIENT)
Dept: FAMILY MEDICINE | Facility: CLINIC | Age: 80
End: 2025-03-07
Payer: MEDICARE

## 2025-03-07 VITALS
WEIGHT: 158.4 LBS | RESPIRATION RATE: 16 BRPM | TEMPERATURE: 97.6 F | OXYGEN SATURATION: 99 % | SYSTOLIC BLOOD PRESSURE: 157 MMHG | DIASTOLIC BLOOD PRESSURE: 78 MMHG | HEART RATE: 75 BPM | BODY MASS INDEX: 24.01 KG/M2 | HEIGHT: 68 IN

## 2025-03-07 DIAGNOSIS — E11.29 TYPE 2 DIABETES MELLITUS WITH MICROALBUMINURIA, WITHOUT LONG-TERM CURRENT USE OF INSULIN (H): ICD-10-CM

## 2025-03-07 DIAGNOSIS — I10 BENIGN ESSENTIAL HYPERTENSION: ICD-10-CM

## 2025-03-07 DIAGNOSIS — N18.31 CKD STAGE G3A/A3, GFR 45-59 AND ALBUMIN CREATININE RATIO >300 MG/G (H): Primary | ICD-10-CM

## 2025-03-07 DIAGNOSIS — R80.9 TYPE 2 DIABETES MELLITUS WITH MICROALBUMINURIA, WITHOUT LONG-TERM CURRENT USE OF INSULIN (H): ICD-10-CM

## 2025-03-07 DIAGNOSIS — Z48.01 ENCOUNTER FOR CHANGE OR REMOVAL OF SURGICAL WOUND DRESSING: ICD-10-CM

## 2025-03-07 RX ORDER — ATORVASTATIN CALCIUM 40 MG/1
TABLET, FILM COATED ORAL
Qty: 90 TABLET | Refills: 1 | Status: SHIPPED | OUTPATIENT
Start: 2025-03-07

## 2025-03-07 RX ORDER — GLIPIZIDE 10 MG/1
10 TABLET, FILM COATED, EXTENDED RELEASE ORAL DAILY
Qty: 90 TABLET | Refills: 1 | Status: SHIPPED | OUTPATIENT
Start: 2025-03-07

## 2025-03-11 NOTE — PROGRESS NOTES
ASSESSMENT/PLAN:  Dennis was seen today for diabetes and derm problem.    Diagnoses and all orders for this visit:    CKD stage G3a/A3, GFR 45-59 and albumin creatinine ratio >300 mg/g (H)  -     Adult Nephrology  Referral; Future    Type 2 diabetes mellitus with microalbuminuria, without long-term current use of insulin (H)  -     atorvastatin (LIPITOR) 40 MG tablet; Take 1 tab daily  -     glipiZIDE (GLUCOTROL XL) 10 MG 24 hr tablet; Take 1 tablet (10 mg) by mouth daily.  -     metFORMIN (GLUCOPHAGE) 500 MG tablet; TAKE TWO TABLETS BY MOUTH EVERY DAY WITH A MEAL  -     Adult Nephrology  Referral; Future    Benign essential hypertension  -     Adult Nephrology  Referral; Future    Encounter for change or removal of surgical wound dressing  -     Wound care       Assessment & Plan  Type 2 diabetes mellitus with microalbuminuria, without long-term current use of insulin (H)  - Diabetes management is not optimal, as indicated by an A1c of 10. Protein loss in urine suggests kidney involvement.  - Refill prescriptions for diabetes medications, including glipizide and metformin. Recommend seeing a kidney doctor for further evaluation.    CKD stage G3a/A3, GFR 45-59 and albumin creatinine ratio >300 mg/g (H)  - Chronic kidney disease is present, likely related to diabetes. Protein loss in urine and increased waste levels indicate worsening kidney function.  - Recommend consultation with a kidney doctor to prevent further deterioration and avoid dialysis or transplant.    Benign essential hypertension  - Hypertension management was not explicitly discussed.  - Refill prescription for cholesterol medication. Monitor blood pressure as part of ongoing management.    I called online pharmacy on his behalf - refill was available, he just needs to pay.    Total visit time with patient was 25 mins, all of which was face to face MD time, and over 50% of this time was spent in counseling and coordination  "of care.  Including post-encounter documentation and orders on the date of service, total encounter time was 32 mins.    The longitudinal plan of care for the diagnosis(es)/condition(s) as documented were addressed during this visit. Due to the added complexity in care, I will continue to support Dennis in the subsequent management and with ongoing continuity of care.      Subjective   Dennis is a 79 year old, presenting for the following health issues:  Diabetes and Derm Problem (Check skin, fall, possible prescribe non stick pad for bandage  )   History of Present Illness-  - Dennis Blum, 79-year-old male.  - Fell down in the library on February 25, 2025, due to slippery steps, landed on back, no fractures reported.  - Experiencing pain and difficulty with skin tears due to thin skin - has been dressed.  Discussed labs obtained yesterday:  - Has been without Brenzavvy for six months due to pharmacy issues.  - Discussed that he now has Chronic kidney disease, with kidneys not retaining protein properly.  - High cholesterol levels, with no current refill for cholesterol medication.  - A1c level of 10, indicating poorly controlled diabetes.      Review of Systems   Still working as a PCA        Objective    Physical Exam   BP (!) 157/78 (BP Location: Right arm, Patient Position: Sitting, Cuff Size: Adult Regular)   Pulse 75   Temp 97.6  F (36.4  C) (Oral)   Resp 16   Ht 1.727 m (5' 8\")   Wt 71.8 kg (158 lb 6.4 oz)   SpO2 99%   BMI 24.08 kg/m       Vitals stable - BP elevated    Dressing changed - healing superficial laceration left forearm  Well nourished and in no distress  Physical Exam- SKIN: Skin atrophy with tearing.  Dressing removed and fresh one reapplied.      Heart sounds normal without murmur    Lungs clear to auscultation, no wheezes/rales/rhonchi, good air entry   No lower extremity edema     Results  - Proteinuria: Presence of protein in urine, indicating kidney dysfunction.  - " Cholesterol: Elevated levels of LDL cholesterol.  - Complete Blood count: Normal.  - Hemoglobin A1c: 10, indicating poor diabetes control.

## 2025-05-27 DIAGNOSIS — I10 BENIGN ESSENTIAL HYPERTENSION: ICD-10-CM

## 2025-05-27 RX ORDER — AMLODIPINE BESYLATE 5 MG/1
5 TABLET ORAL DAILY
Qty: 90 TABLET | Refills: 1 | Status: SHIPPED | OUTPATIENT
Start: 2025-05-27

## 2025-05-27 RX ORDER — LISINOPRIL 40 MG/1
40 TABLET ORAL DAILY
Qty: 90 TABLET | Refills: 0 | Status: SHIPPED | OUTPATIENT
Start: 2025-05-27

## 2025-05-27 NOTE — TELEPHONE ENCOUNTER
amLODIPine (NORVASC) 5 MG tablet         Sig: Take 1 tablet (5 mg) by mouth daily.    Disp: 90 tablet    Refills: 1    Start: 5/27/2025    Class: E-Prescribe    Non-formulary For: Benign essential hypertension    Last ordered: 6 months ago (11/21/2024) by Edgard Morgan MD    Calcium Channel Blockers Protocol  [id number]/27/2025 11:01 AM   Protocol Details Most recent blood pressure under 140/90 in past 12 months    GFR is on file in the past 12 months and most recent GFR is normal          Abdon Ochoa, RN, MSN

## 2025-05-27 NOTE — TELEPHONE ENCOUNTER
lisinopril (ZESTRIL) 40 MG tablet         Sig: Take 1 tablet (40 mg) by mouth daily.    Disp: 90 tablet    Refills: 1    Start: 5/27/2025    Class: E-Prescribe    Non-formulary For: Benign essential hypertension    Last ordered: 6 months ago (11/21/2024) by Edgard Morgan MD    ACE Inhibitors (Including Combos) Protocol [id number]/27/2025 09:48 AM   Protocol Details Most recent blood pressure under 140/90 in past 12 months- Clinicial or Patient Reported    Normal serum potassium on file in past 12 months        Lotus, RN, BSN

## 2025-05-27 NOTE — TELEPHONE ENCOUNTER
Medication Question or Refill        What medication are you calling about (include dose and sig)?: Lisinopril 40mg  and amlodipine 5mg    Preferred Pharmacy:       [facility] [place] - [place] - [address] [place] [address] [address]  [address] [facility] [address]  Phone: [phone] Fax: [phone]        Controlled Substance Agreement on file:   CSA -- Patient Level:    CSA: None found at the patient level.       Who prescribed the medication?: ?    Do you need a refill? Yes    When did you use the medication last? ?    Patient offered an appointment? No    Do you have any questions or concerns?  Yes: refill      Could we send this information to you in [facility] or would you prefer to receive a phone call?:   Patient would prefer a phone call   Okay to leave a detailed message?: Yes at Home number on file [phone] (home)      Does this patient currently have active insurance coverage?  Yes, Pt has active insurance coverage.     Does patient or caller know when to expect a call? Yes, PCS will return call within 24 business hours.     Dony Chaudhry on 5/27/2025 at 9:38 AM

## 2025-06-24 NOTE — PROGRESS NOTES
"  Assessment & Plan     Epidermoid cyst of skin of back  See photos below. Bothersome, itchy. Also has multiple not bothersome Sk's. Would like epidermoid cyst removed. Will schedule.     Rosacea  Being seen by [facility]. Has triple topical therapy with metronidazole, azelaic acid, and ivermectin. Despite use for about one month, not getting relief.   - Consider accutane, PO antibiotics    History of basal cell carcinoma  - Recommend follow up for complete skin check with dermatology clinic    Chronic kidney disease, stage 3b (H)  Type 2 diabetes mellitus with microalbuminuria, without long-term current use of insulin (H)  Gets his SGLT2i from [facility] pharmacy. Requesting that his PCP, Dr. Christy, refill this through [facility].   - Appreciate PCP assistance, will forward note    Benign essential hypertension  Not controlled today. However, he reports good control at home.   - Continue amlodipine 5 mg  - Follow up with PCP    Follow-up  Return in about 4 weeks (around 7/25/2025) for Follow up.    Shelli Collins is a 79 year old, presenting for the following health issues:  Derm Problem (Back was itching a lot and some rashes. - pt has pictures. Pt has a \"black thing\" on his back and some bumps on his back that is very itching. This had occurred cos of his excessive sweating from working out. Poss acne on the back. ), Referral (Referral to derm - but was refused. Pt had seen dermatologist  ), and Medication Problem (Med issues with his dm meds - difficulty to refill - always has to wait for the doctor to refill. Alex. - out for 2 weeks. )    BRANDIN Collins is a 79 year old male here for evaluation of rash on his back.   His son noticed 2 suspicious spots on his back.   The spots are itchy.   On is over his left shoulder blade, darker spot. Another one is a raised red lesion over his left paraspinal thoracic  area.   Itching started about 3 months ago.   First noticed the spots last week when his " son looked at his back.     Does have a history of BCC several years ago.     Also has a long history of rosacea. Was being seen at [facility]. Has triple topical therapy cream with metronidazole, ivermectin, and azelaic acid. This has not helped.     BP elevated in clinic today.   Takes amlodipine 5 mg daily.   Home BP: 120/76, 119/65.   Tolerates this medication well.     Getting his SGLT2i through [facility] pharmacy. Has been out for about 2 weeks now, they stated he  needs to meet with his PCP for a refill.     Review of Systems  Constitutional, neuro, ENT, endocrine, pulmonary, cardiac, gastrointestinal, genitourinary, musculoskeletal, integument and psychiatric systems are negative, except as otherwise noted.      Objective    BP (!) 148/79   Pulse 71   Temp 97.2  F (36.2  C) (Tympanic)   Resp 16   Wt 68.9 kg (152 lb)   SpO2 97%   BMI 23.11 kg/m    Body mass index is 23.11 kg/m .  Physical Exam   GENERAL: alert and no distress  RESP: Breathing comfortably on room air, no signs of distress  CV: regular rate, acyanotic, no peripheral edema bilaterally  ABDOMEN: Non distended  MS: no gross musculoskeletal defects noted, no edema  SKIN: Numerous benign SKs on back; on left scapular area with ulcerated darkened keratotic plug < 0.5 cm without bleeding, drainage, surrounding erythema, or TTP. Left paraspinal mid thoracic erythematous papule with scaling and surrounding induration, no drainage, bleeding, warmth. See photos below.   NEURO: Normal strength and tone, mentation intact and speech normal  PSYCH: mentation appears normal, affect normal/bright                Discussed with attending, Dr. Naranjo.   Signed Electronically by: Joy Navarro DO PGY3

## 2025-06-27 ENCOUNTER — OFFICE VISIT (OUTPATIENT)
Dept: FAMILY MEDICINE | Facility: CLINIC | Age: 80
End: 2025-06-27
Payer: MEDICARE

## 2025-06-27 VITALS
BODY MASS INDEX: 23.11 KG/M2 | RESPIRATION RATE: 16 BRPM | HEART RATE: 71 BPM | TEMPERATURE: 97.2 F | OXYGEN SATURATION: 97 % | WEIGHT: 152 LBS | SYSTOLIC BLOOD PRESSURE: 148 MMHG | DIASTOLIC BLOOD PRESSURE: 79 MMHG

## 2025-06-27 DIAGNOSIS — R80.9 TYPE 2 DIABETES MELLITUS WITH MICROALBUMINURIA, WITHOUT LONG-TERM CURRENT USE OF INSULIN (H): ICD-10-CM

## 2025-06-27 DIAGNOSIS — I10 BENIGN ESSENTIAL HYPERTENSION: ICD-10-CM

## 2025-06-27 DIAGNOSIS — N18.32 CHRONIC KIDNEY DISEASE, STAGE 3B (H): ICD-10-CM

## 2025-06-27 DIAGNOSIS — E11.29 TYPE 2 DIABETES MELLITUS WITH MICROALBUMINURIA, WITHOUT LONG-TERM CURRENT USE OF INSULIN (H): ICD-10-CM

## 2025-06-27 DIAGNOSIS — L71.9 ROSACEA: ICD-10-CM

## 2025-06-27 DIAGNOSIS — Z85.828 HISTORY OF BASAL CELL CARCINOMA: ICD-10-CM

## 2025-06-27 DIAGNOSIS — L72.0 EPIDERMOID CYST OF SKIN OF BACK: Primary | ICD-10-CM

## 2025-06-27 DIAGNOSIS — D23.9 DILATED PORE OF WINER: ICD-10-CM

## 2025-06-27 PROCEDURE — 3077F SYST BP >= 140 MM HG: CPT

## 2025-06-27 PROCEDURE — 3078F DIAST BP <80 MM HG: CPT

## 2025-06-27 PROCEDURE — 99214 OFFICE O/P EST MOD 30 MIN: CPT | Mod: GC

## 2025-06-27 RX ORDER — BEXAGLIFLOZIN 20 MG/1
1 TABLET ORAL DAILY
Qty: 90 TABLET | Refills: 1 | Status: CANCELLED | OUTPATIENT
Start: 2025-06-27

## 2025-06-27 RX ORDER — MINERAL OIL/HYDROPHIL PETROLAT
OINTMENT (GRAM) TOPICAL PRN
Qty: 198 G | Refills: 11 | Status: SHIPPED | OUTPATIENT
Start: 2025-06-27

## 2025-06-27 NOTE — PROGRESS NOTES
Preceptor Attestation:    I discussed the patient with the resident and evaluated the patient in person. I have verified the content of the note, which accurately reflects my assessment of the patient and the plan of care.    Dilated pore of [place] on the left shoulder. Recently inflamed epidermoid cyst on the left back. History of skin cancers.       Supervising Physician:  Whitley Naranjo MD

## 2025-06-27 NOTE — PROGRESS NOTES
Critical Vital Report    Did patient have a critical vital during today's visit? Yes  Which vital is reporting as critical?: Blood Pressure    I personally notified the following: Provider    Action(s) taken: I left room and notified provider personally      BP Readings from Last 3 Encounters:   06/27/25 (!) 148/79   03/07/25 (!) 157/78   02/27/25 (!) 166/76

## 2025-06-27 NOTE — PATIENT INSTRUCTIONS
The medication we were thinking about using is called Accutane (isotretinoin).    Accutane can help with rosacea that isn't getting better with other medications. The treatment with Accutane is expected to last about 6 months. Many patients with rosacea who take Accutane treatment have improvement in their rosacea, but some of them have to take repeated doses in the future, or remain on Accutane long term.    If you take Accutane you are required to come to the doctor every month to get a refill and to do laboratory tests. The most common risks of Accutane include: skin drying, nose bleeds. There is a possibility that Accutane can make your triglycerides (a part of your cholesterol) go up temporarily. There is a possibility that Accutane can cause temporary liver irritation. These get better when you stop taking Accutane.    Accutane is not well-studied in people with kidney disease, but appears to be safe, and some animal studies show that it actually improves kidney function.

## 2025-07-12 ENCOUNTER — HEALTH MAINTENANCE LETTER (OUTPATIENT)
Age: 80
End: 2025-07-12

## 2025-07-28 ENCOUNTER — OFFICE VISIT (OUTPATIENT)
Dept: FAMILY MEDICINE | Facility: CLINIC | Age: 80
End: 2025-07-28
Payer: MEDICARE

## 2025-07-28 VITALS
SYSTOLIC BLOOD PRESSURE: 155 MMHG | RESPIRATION RATE: 20 BRPM | OXYGEN SATURATION: 95 % | TEMPERATURE: 98.3 F | DIASTOLIC BLOOD PRESSURE: 80 MMHG | BODY MASS INDEX: 23.52 KG/M2 | WEIGHT: 155.2 LBS | HEIGHT: 68 IN | HEART RATE: 72 BPM

## 2025-07-28 DIAGNOSIS — Z23 NEED FOR VACCINATION: ICD-10-CM

## 2025-07-28 DIAGNOSIS — Z98.890 HISTORY OF REMOVAL OF CYST: Primary | ICD-10-CM

## 2025-07-28 DIAGNOSIS — L60.2 OVERGROWN NAIL: ICD-10-CM

## 2025-07-28 DIAGNOSIS — L60.2 THICKENED NAILS: ICD-10-CM

## 2025-07-28 DIAGNOSIS — Z48.02 ENCOUNTER FOR REMOVAL OF SUTURES: ICD-10-CM

## 2025-07-28 DIAGNOSIS — N18.32 CHRONIC KIDNEY DISEASE, STAGE 3B (H): Primary | ICD-10-CM

## 2025-07-28 NOTE — PROGRESS NOTES
"  {PROVIDER CHARTING PREFERENCE:236851}    Shelli Collins is a 80 year old, presenting for the following health issues:  RECHECK (Recheck on incision in the back.  Over the weekend is bleeding)      7/28/2025    10:29 AM   Additional Questions   Roomed by trey   Accompanied by self         7/28/2025    Information    services provided? No     HPI      Patient presenting for check of cyst removal site after was patient's cyst was removed by Dr. Christy from left upper back on 7/25.  Patient was concerned because his son had noted bleeding, and he felt that he was exerting himself with his upper extremities and feared that he may have caused the stitches to open up.    BP is elevated today in clinic x 2.  Patient says that his blood pressure is always elevated in clinic but it is within normal limits at home.        Objective    BP (!) 155/80   Pulse 72   Temp 98.3  F (36.8  C) (Oral)   Resp 20   Ht 1.727 m (5' 8\")   Wt 70.4 kg (155 lb 3.2 oz)   SpO2 95%   BMI 23.60 kg/m    Body mass index is 23.6 kg/m .  Physical Exam   {Exam List (Optional):776516}    {Diagnostic Test Results (Optional):741961}        Signed Electronically by: Allie Wilkes MD    " "today.          Objective    BP (!) 155/80   Pulse 72   Temp 98.3  F (36.8  C) (Oral)   Resp 20   Ht 1.727 m (5' 8\")   Wt 70.4 kg (155 lb 3.2 oz)   SpO2 95%   BMI 23.60 kg/m    Body mass index is 23.6 kg/m .  Physical Exam   GENERAL: alert and no distress  EYES: Eyes grossly normal to inspection, conjunctivae and sclerae normal  Back: two sutures on left upper back, will well healed incision site, no active bleeding  RESP: no increased work of breathing  Extremities: thickened overgrowth of toenails bilaterally  SKIN: no suspicious lesions or rashes  NEURO: Normal strength and tone, mentation intact and speech normal  PSYCH: mentation appears normal, affect normal/bright          Signed Electronically by: Allie Wilkes MD    "

## 2025-07-28 NOTE — PATIENT INSTRUCTIONS
Today we took a look at your stitches.  The area appeared to be healing well, so we removed your stitches.  We put Steri Strips on it.  Keep it dry for 24 hours, and then tomorrow you can take a shower and get the area wet but do not scrub on top of it.  There Steri Strips should come off by themselves within a few days.

## 2025-07-28 NOTE — PROGRESS NOTES
"Preceptor Attestation:  Vitals:    07/28/25 1029 07/28/25 1030   BP: (!) 158/80 (!) 155/80   Pulse: 72    Resp: 20    Temp: 98.3  F (36.8  C)    TempSrc: Oral    SpO2: 95%    Weight: 70.4 kg (155 lb 3.2 oz)    Height: 1.727 m (5' 8\")           I discussed the patient with the resident and evaluated the patient in person. I have verified the content of the note, which accurately reflects my assessment of the patient and the plan of care.   Supervising Physician:  Yamileth Ramon MD    "

## 2025-07-29 ENCOUNTER — PATIENT OUTREACH (OUTPATIENT)
Dept: CARE COORDINATION | Facility: CLINIC | Age: 80
End: 2025-07-29
Payer: MEDICARE

## 2025-07-31 ENCOUNTER — PATIENT OUTREACH (OUTPATIENT)
Dept: CARE COORDINATION | Facility: CLINIC | Age: 80
End: 2025-07-31
Payer: MEDICARE

## 2025-08-25 DIAGNOSIS — I10 BENIGN ESSENTIAL HYPERTENSION: ICD-10-CM

## 2025-08-25 RX ORDER — LISINOPRIL 40 MG/1
40 TABLET ORAL DAILY
Qty: 90 TABLET | Refills: 1 | Status: SHIPPED | OUTPATIENT
Start: 2025-08-25